# Patient Record
(demographics unavailable — no encounter records)

---

## 2018-05-29 NOTE — DIAGNOSTIC IMAGING REPORT
EXAMINATION: Right foot, three views.



COMPARISON: None. 



HISTORY: 83-year-old female, evaluation for osteomyelitis of the

fourth toe. History of ulcer. 



FINDINGS: The bones are demineralized. There is uncovering of the

lateral sesamoid with hallux valgus deformity. The first proximal

phalanx is laterally subluxed at the first metatarsophalangeal

joint. Lateral view evaluation is limited given overlap of

multiple bones. There is absence of portions of the proximal

aspect of the fourth middle phalanx without definite acute

appearing aggressive bone destruction radiographically. There is

no definite periosteal reaction. There is no identified

radiopaque foreign body. There is a dorsal soft tissue marker at

the level of the fourth proximal interphalangeal joint. There is

a calcaneal heel spur. There is no ankle joint effusion.



IMPRESSION: 

1. Absence of portions of the proximal aspect of the fourth

middle phalanx of uncertain exact chronicity. There is no overtly

obvious aggressive bone destruction radiographically. This is a

change since comparison foot radiographs of May 2015. Dedicated

MRI right foot would be recommended for further evaluation of

potential osteomyelitis.

2. Bone demineralization.

3. Hallux valgus deformity.

4. No identified radiopaque foreign body.



Dictated by: 



  Dictated on workstation # [id number]

## 2018-06-04 NOTE — DIAGNOSTIC IMAGING REPORT
PROCEDURE: MRI right lower extremity without contrast.



TECHNIQUE: Multiplanar, multisequence non contrast-enhanced MRI

of the right lower extremity was accomplished.



INDICATION: Fourth toe redness and swelling.



COMPARISON: Radiographs of 05/29/2018.



FINDINGS: There is abnormal circumferential soft tissue

thickening of the proximal and mid aspect of the fourth toe. This

has heterogeneous increased T2 signal within the thickened soft

tissues and T1 isointensity to muscle. There are erosive changes

of the fourth proximal phalanx head and near-complete erosion of

the fourth middle phalanx. The distal phalanx has a normal

appearance.



In the first metatarsal head, there are chronic periarticular

erosions present along the medial aspect which can be seen with

gout. The remainder of the visualized osseous structures in the

forefoot are unremarkable. Hallux valgus deformity is present

with crossover deformity of the great toe with the second toe.

Mild fatty atrophy of the intrinsic musculature of the foot.



IMPRESSION:

1. Abnormal circumferential soft tissue thickening of the fourth

toe with underlying erosive changes of the middle and proximal

phalanges. Imaging features are most suggestive of

subacute-to-chronic inflammatory process such as gout.

Correlation with serum uric acid levels and physical examination

is advised. If the patient has a skin wound present,

osteomyelitis could be present but this would be an atypical

appearance for "routine" osteomyelitis. Lastly, giant cell tumor

of the tendon sheath could potentially have this appearance.



Dictated by: 



  Dictated on workstation # [id number]

## 2018-06-12 NOTE — ED GENERAL
General


Chief Complaint:  General Problems/Pain


Stated Complaint:  ELEVATED BP


Nursing Triage Note:  


TO ROOM FROM WOUND CARE. WOUND CARE CONCERN BECAUSE B/P ELEVATED. PATIENT 


REPORTS THAT HAS BEEN  TAKEN OFF  B/P MEDS. PATIENT HAS NO C/O PAIN OR ANY 


PROBLEMS


Nursing Sepsis Screen:  No Definite Risk


Source of Information:  Patient


Exam Limitations:  No Limitations





History of Present Illness


Date Seen by Provider:  Jun 12, 2018


Time Seen by Provider:  10:20


Initial Comments


This 83-year-old woman presents to the emergency room as referred by wound 

care.  She was found to have an unacceptably high blood pressure in the wound 

care clinic and was sent here by RODOLFO Carson.  On our assessment her 

blood pressure is 162/87.  Patient denies any symptoms related to hypertension 

such as headache, blurry vision, chest pain, etc.  She reports changes to her 

blood pressure management and recent cessation of antihypertensive medications.





Allergies and Home Medications


Allergies


Coded Allergies:  


     No Known Drug Allergies (Verified , 10/7/16)





Home Medications


Acetaminophen 500 Mg Tablet, 1,000 MG PO TID


   Prescribed by: FADUMO LLOYD on 10/11/16 2046


Aspirin 81 Mg Tablet.dr, 81 MG PO DAILY, (Reported)


Aspirin 81 Mg Tablet.dr, 81 MG PO DAILY, (Reported)


Ca Cmb No.1/Vit D3/B-6/Fa/B12 1 Each Tablet, 1,000 UNITS PO DAILY, (Reported)


Citalopram Hydrobromide 20 Mg Tablet, 20 MG PO HS, (Reported)


Docosahexanoic Acid/Epa 1 Cap Capsule, 1,000 MG PO BID, (Reported)


Docusate Sodium 100 Mg Capsule, 100 MG PO BID


   Prescribed by: FADUMO LLOYD on 10/11/16 2046


Lisinopril 20 Mg Tablet, 20 MG PO DAILY@0900


   Prescribed by: FADUMO LLOYD on 10/11/16 2046


Nebivolol HCl 5 Mg Tablet, 5 MG PO DAILY, (Reported)


Omega 3 Polyunsat Fatty Acids 1,000 Mg Cap, 1,000 MG PO DAILY, (Reported)


Pantoprazole Sodium 40 Mg Tablet.dr, 40 MG PO DAILY@0700


   Prescribed by: FADUMO LLOYD on 10/11/16 2046


Simvastatin 20 Mg Tablet, 20 MG PO HS, (Reported)


Sucralfate 1 Gm Tablet, 1 GM PO ACHS


   Prescribed by: FADUMO LLOYD on 10/11/16 2046


Tramadol HCl 50 Mg Tablet, 50 MG PO TID PRN for PAIN


   Prescribed by: BHAVNA TINEO on 9/16/16 1226





Patient Home Medication List


Home Medication List Reviewed:  Yes





Review of Systems


Constitutional:  no symptoms reported


EENTM:  no symptoms reported


Respiratory:  no symptoms reported


Cardiovascular:  see HPI


Gastrointestinal:  no symptoms reported


Genitourinary:  no symptoms reported


Musculoskeletal:  no symptoms reported


Skin:  no symptoms reported


Psychiatric/Neurological:  No Symptoms Reported


Hematologic/Lymphatic:  No Symptoms Reported





Past Medical-Social-Family Hx


Past Med/Social Hx:  Reviewed and Corrections made


Patient Social History


Alcohol Use:  Denies Use


Recreational Drug Use:  No


Smoking Status:  Never a Smoker


Recent Foreign Travel:  No


Contact w/Someone Who Travel:  No


Recent Infectious Disease Expo:  No


Recent Hopitalizations:  No





Immunizations Up To Date


Tetanus Booster (TDap):  Unknown


PED Vaccines UTD:  No


Date of Pneumonia Vaccine:  Oct 1, 2017


Date of Influenza Vaccine:  Oct 1, 2017





Seasonal Allergies


Seasonal Allergies:  No





Past Medical History


Surgeries:  No


Respiratory:  No


Cardiac:  Yes (SEES DR. BUSBY UNSURE WHY)


Hypertension


Neurological:  Yes


Dementia


Reproductive Disorders:  No


Female Reproductive Disorders:  Denies


Sexually Transmitted Disease:  No


HIV/AIDS:  No


Genitourinary:  Yes


UTI-Chronic


Gastrointestinal:  No


Musculoskeletal:  Yes


Fractures, Gout


Endocrine:  Yes


Hyperthyroidism


HEENT:  Yes


Cataract


Loss of Vision:  Denies


Hearing Impairment:  Hard of Hearing


Cancer:  No


Psychosocial:  No


Integumentary:  No


Recent Skin Changes


Blood Disorders:  No


Adverse Reaction/Blood Tranf:  No





Family Medical History





Dementia


  G8 SISTER


Diabetes mellitus


  19 FATHER


No Pertinent Family Hx, Diabetes, Psychiatric Problems





Physical Exam


Vital Signs





Vital Signs - First Documented








 6/12/18





 10:47


 


Pulse 68


 


Resp 18


 


B/P (MAP) 162/87


 


Pulse Ox 93


 


O2 Delivery Room Air





Capillary Refill : Less Than 3 Seconds


General Appearance:  No Apparent Distress, WD/WN


HEENT:  PERRL/EOMI, Normal ENT Inspection


Neck:  Normal Inspection


Respiratory:  Lungs Clear, Normal Breath Sounds, No Accessory Muscle Use, No 

Respiratory Distress


Cardiovascular:  Regular Rate, Rhythm, No Edema, No Murmur


Neurologic/Psychiatric:  Alert, Oriented x3, No Motor/Sensory Deficits, Normal 

Mood/Affect, CNs II-XII Norm as Tested


Skin:  Normal Color, Warm/Dry





Progress/Results/Core Measures


Suspected Sepsis


Recent Fever Within 48 Hours:  No


Infection Criteria Present:  None


New/Unexplained  Altered Menta:  No


Sepsis Screen:  No Definite Risk


SIRS


Temperature: 


Pulse:  


Respiratory Rate: 


 


Blood Pressure  / 


Mean:





Results/Orders


Vital Signs/I&O


Capillary Refill : Less Than 3 Seconds


Progress Note :  


Progress Note


Patient has asymptomatic non-critical hypertension and was referred back to her 

primary care provider for repeat blood pressure check and medication management.





Departure


Impression





 Primary Impression:  


 Hypertension


 Qualified Codes:  I10 - Essential (primary) hypertension


Disposition:  01 HOME, SELF-CARE


Condition:  Improved





Departure-Patient Inst.


Decision time for Depature:  10:30


Referrals:  


NI HUMMEL MD (PCP/Family)


Primary Care Physician





Add. Discharge Instructions:  


Follow-up with your primary care provider soon as possible for a repeat blood 

pressure check.  Please call today for an appointment.  Return to emergency 

room if you are developing symptoms such as headache, changes in vision, chest 

pain, etc. that may be related to high blood pressure.  If you're not able to 

secure an appointment within the next few days, please stop in the clinic for a 

blood pressure check.




















All discharge instructions reviewed with patient and/or family. Voiced 

understanding.





Copy


Copies To 1:   ALIZA RODRIGUEZ JOSHUA T MD Jun 12, 2018 10:41

## 2018-06-28 NOTE — DIAGNOSTIC IMAGING REPORT
PROCEDURE: CT head and CT cervical spine without contrast.



TECHNIQUE: Multiple contiguous axial images were obtained through

the brain and cervical spine without the use of intravenous

contrast. Sagittal and coronal reformations through the cervical

spine were then performed.



INDICATION: Fall striking the head. Study correlated with brain

MRI performed in 2015 and with head CT 08/23/2012. No prior

cervical imaging



CT head: There is focal soft tissue irregularities posterior left

laterally with the underlying calvarium appearing normal. No

fracture deformity. No hemo-sinus. There is cerebral cortical

atrophy and periventricular white matter small vessel sequelae

but no evidence for edema or hemorrhage. No mass or mass effect.

Aside from the soft tissue swelling, no other acute abnormality

identified.



CT cervical spine: There is grade 1 anterolisthesis C3 on C4, C4

on C5 and retrolisthesis C6 on C7; these are all about 2-3 mm and

are accompanied by degenerative changes to the discs, endplates,

uncovertebral joints and facets. Cervical statures are within

normal limits and there was no acute or suspicious endplate

irregularity or fracture. There is vascular calcifications of the

carotids. There is no paravertebral mass, hemorrhage or fluid

collection.



IMPRESSION:



CT head: Soft tissue swelling in the left scalp posteriorly but

no underlying fracture or intracranial hemorrhage. There are

chronic senescent intracranial changes of atrophy and white

matter disease but no hemorrhage or other abnormality.



CT cervical spine: Degenerative changes throughout the cervical

spine are believed to account for slight grade 1 multilevel

degenerative listhesis. No fracture or dislocation.



 



Dictated by: 



  Dictated on workstation # [id number]

## 2018-06-28 NOTE — ED FALL/INJURY
General


Chief Complaint:  Trauma-Non Activation


Stated Complaint:  FELL,HEAD INJ


Source:  patient


Exam Limitations:  no limitations





History of Present Illness


Date Seen by Provider:  2018


Time Seen by Provider:  07:19


Initial Comments


Here with report of fall and head injury this morning.  Apparently was walking 

with her walker when she lost her balance and went forward.  She is able to get 

herself righted and then fell backwards against the door frame.  She hit her 

head on the door frame and has a bump on the back of her head on the left side 

as well as abrasion.  No loss of consciousness.  She did not go all the way to 

the floor.  States that she is not sick currently does not feel weaker than 

normal but she was 83.  She reports that is the reason for her imbalance.  

Denies nausea or vomiting.  Denies vision problems.


Occurred:  this morning


Severity:  mild


Injuries/Pain Location:  head


Context:  lost balance


Loss of Consciousness:  no loss of consciousness


Modifying Factors:  Improves With Rest


Associated Symptoms (Fall):  No Abdominal Pain, No Chest Pain, No Confusion, No 

Headache, No Muscle Spasms, No Nausea/Vomiting, No Vision Changes





Allergies and Home Medications


Allergies


Coded Allergies:  


     No Known Drug Allergies (Verified , 10/7/16)





Home Medications


Acetaminophen 500 Mg Tablet, 1,000 MG PO TID


   Prescribed by: FADUMO LLOYD on 10/11/16 2046


Aspirin 81 Mg Tablet.dr, 81 MG PO DAILY, (Reported)


Aspirin 81 Mg Tablet.dr, 81 MG PO DAILY, (Reported)


Ca Cmb No.1/Vit D3/B-6/Fa/B12 1 Each Tablet, 1,000 UNITS PO DAILY, (Reported)


Citalopram Hydrobromide 20 Mg Tablet, 20 MG PO HS, (Reported)


Docosahexanoic Acid/Epa 1 Cap Capsule, 1,000 MG PO BID, (Reported)


Docusate Sodium 100 Mg Capsule, 100 MG PO BID


   Prescribed by: FADUMO LLOYD on 10/11/16 2046


Lisinopril 20 Mg Tablet, 20 MG PO DAILY@0900


   Prescribed by: FADUMO LLOYD on 10/11/16 2046


Nebivolol HCl 5 Mg Tablet, 5 MG PO DAILY, (Reported)


Omega 3 Polyunsat Fatty Acids 1,000 Mg Cap, 1,000 MG PO DAILY, (Reported)


Pantoprazole Sodium 40 Mg Tablet.dr, 40 MG PO DAILY@0700


   Prescribed by: FADUMO LLOYD on 10/11/16 2046


Simvastatin 20 Mg Tablet, 20 MG PO HS, (Reported)


Sucralfate 1 Gm Tablet, 1 GM PO ACHS


   Prescribed by: FADUMO LLOYD on 10/11/16 2046


Tramadol HCl 50 Mg Tablet, 50 MG PO TID PRN for PAIN


   Prescribed by: BHAVNA TINEO on 16 1226





Patient Home Medication List


Home Medication List Reviewed:  Yes





Review of Systems


Constitutional:  see HPI; No chills, No fever


Eyes:  No Symptoms Reported


Ears, Nose, Mouth, Throat:  no symptoms reported


Respiratory:  no symptoms reported


Cardiovascular:  No chest pain, No syncope


Gastrointestinal:  No nausea, No vomiting


Genitourinary:  no symptoms reported


Musculoskeletal:  No back pain, No joint pain, No neck pain


Skin:  see HPI, change in color, lesions


Psychiatric/Neurological:  No Symptoms Reported





Past Medical-Social-Family Hx


Past Med/Social Hx:  Reviewed and Corrections made


Patient Social History


Alcohol Use:  Denies Use


Recreational Drug Use:  No


Smoking Status:  Never a Smoker


Recent Foreign Travel:  No


Contact w/Someone Who Travel:  No


Recent Hopitalizations:  No





Immunizations Up To Date


Tetanus Booster (TDap):  Unknown


PED Vaccines UTD:  No


Date of Pneumonia Vaccine:  Oct 1, 2017


Date of Influenza Vaccine:  Oct 1, 2017





Seasonal Allergies


Seasonal Allergies:  No





Past Medical History


Surgeries:  No


Respiratory:  No


Cardiac:  Yes (SEES DR. BUSBY UNSURE WHY)


Hypertension


Neurological:  Yes


Dementia


Reproductive Disorders:  No


Female Reproductive Disorders:  Denies


Sexually Transmitted Disease:  No


HIV/AIDS:  No


Genitourinary:  Yes


UTI-Chronic


Gastrointestinal:  No


Musculoskeletal:  Yes


Fractures, Gout


Endocrine:  Yes


Hyperthyroidism


HEENT:  Yes


Cataract


Loss of Vision:  Denies


Hearing Impairment:  Hard of Hearing


Cancer:  No


Psychosocial:  No


Integumentary:  No


Recent Skin Changes


Blood Disorders:  No


Adverse Reaction/Blood Tranf:  No





Family Medical History


Reviewed Nursing Family Hx





Dementia


  G8 SISTER


Diabetes mellitus


  19 FATHER


No Pertinent Family Hx, Diabetes, Psychiatric Problems





Physical Exam


Vital Signs





Vital Signs - First Documented








 18





 07:16


 


Temp 97.4


 


Pulse 77


 


Resp 16


 


B/P (MAP) 160/67 (98)


 


Pulse Ox 97


 


O2 Delivery Room Air





Capillary Refill :


General Appearance:  WD/WN, no apparent distress


HEENT:  PERRL/EOMI, pharynx normal


Neck:  full range of motion, supple


Cardiovascular:  regular rate, rhythm, no murmur


Respiratory:  lungs clear, normal breath sounds


Peripheral Pulses:  2+ Dorsalis Pedis (R), 2+ Left Dors-Pedis (L), 2+ Radial 

Pulses (R), 2+ Radial Pulses (L)


Gastrointestinal:  non tender, soft


Back:  normal inspection, no CVA tenderness, no vertebral tenderness


Extremities:  non-tender, normal inspection


Neurologic/Psychiatric:  alert, oriented x 3


Skin:  warm/dry, other (abrasion overlying 3 x 3 cm scalp hematoma left 

posterior scalp.  No significant laceration.)





Lookout Coma Score


Best Eye Response:  (4) Open Spontaneously


Best Verbal Response:  (5) Oriented


Best Motor Response:  (6) Obeys Commands





Progress/Results/Core Measures


Results/Orders


My Orders





Orders - NARENDRA ROSAS MD


Ct Head/Cervical Spine Wo (18 07:30)


Dipht,Pertuss(Acell),Tet Adult (Boostrix (18 07:30)





Vital Signs/I&O











 18





 07:16


 


Temp 97.4


 


Pulse 77


 


Resp 16


 


B/P (MAP) 160/67 (98)


 


Pulse Ox 97


 


O2 Delivery Room Air











Progress


Progress Note :  


Progress Note


Seen and evaluated.  CT head and neck ordered.  Wound cleaned and covered with 

antibiotic per nursing.  Monitor patient.  Tetanus updated.  0820: No acute 

findings.  Discharged home with return precautions.  Patient verbalize 

understanding instructions and agreement with plan.





Diagnostic Imaging





   Diagonstic Imaging:  CT


   Plain Films/CT/US/NM/MRI:  c-spine, head


Comments


NAME:   DARRION SINCLAIR [facility] REC#:   [id number]


ACCOUNT#:   [id number]


PT STATUS:   REG ER


:   1935


PHYSICIAN:   NARENDRA ROSAS MD


ADMIT DATE:   18/ER


 ***Draft***


Date of Exam:18





CT HEAD/CERVICAL SPINE WO








PROCEDURE: CT head and CT cervical spine without contrast.





TECHNIQUE: Multiple contiguous axial images were obtained through


the brain and cervical spine without the use of intravenous


contrast. Sagittal and coronal reformations through the cervical


spine were then performed.





INDICATION: Fall striking the head. Study correlated with brain


MRI performed in  and with head CT 2012. No prior


cervical imaging





CT head: There is focal soft tissue irregularities posterior left


laterally with the underlying calvarium appearing normal. No


fracture deformity. No hemo-sinus. There is cerebral cortical


atrophy and periventricular white matter small vessel sequelae


but no evidence for edema or hemorrhage. No mass or mass effect.


Aside from the soft tissue swelling, no other acute abnormality


identified.





CT cervical spine: There is grade 1 anterolisthesis C3 on C4, C4


on C5 and retrolisthesis C6 on C7; these are all about 2-3 mm and


are accompanied by degenerative changes to the discs, endplates,


uncovertebral joints and facets. Cervical statures are within


normal limits and there was no acute or suspicious endplate


irregularity or fracture. There is vascular calcifications of the


carotids. There is no paravertebral mass, hemorrhage or fluid


collection.





IMPRESSION:





CT head: Soft tissue swelling in the left scalp posteriorly but


no underlying fracture or intracranial hemorrhage. There are


chronic senescent intracranial changes of atrophy and white


matter disease but no hemorrhage or other abnormality.





CT cervical spine: Degenerative changes throughout the cervical


spine are believed to account for slight grade 1 multilevel


degenerative listhesis. No fracture or dislocation.





 





  Dictated on workstation # [id number]








Dict:   [id number]


Trans:   18 0816


SHANI [id number]





Interpreted by:     YANIQUE BARRAGAN


Electronically signed by:





Departure


Impression





 Primary Impression:  


 Head injury, acute, without loss of consciousness


 Qualified Codes:  S09.90XA - Unspecified injury of head, initial encounter


 Additional Impression:  


 Abrasion


Disposition:  01 HOME, SELF-CARE


Condition:  Stable





Departure-Patient Inst.


Decision time for Depature:  08:25


Referrals:  


NI HUMMEL MD (PCP/Family)


Primary Care Physician


Patient Instructions:  Minor Head Injury (DC), Skin Abrasions (DC)





Add. Discharge Instructions:  


All discharge instructions reviewed with patient and/or family. Voiced 

understanding.





Follow-up with your doctor this week for recheck.  Return for worsening, fever, 

vomiting, weakness, breathing problems or other concerns as needed.  You may 

use antibiotic ointment over area of abrasion on the back of the head once or 

twice daily for the next several days and then as needed.











NARENDRA ROSAS MD 2018 07:38

## 2018-08-09 NOTE — XMS REPORT
[facility]

 Created on: 2018



Boby Kely

External Reference #: [id number]

: 1935

Sex: Female



Demographics







 Address  [address] [place]  [address]

 

 Preferred Language  Unknown

 

 Marital Status  Unknown

 

 Islam Affiliation  Unknown

 

 Race  Unknown

 

 Ethnic Group  Unknown





Author







 Author  ODIN GAUTHIER

 

 [facility]

 

 Address  [address] [place]  [address]



 

 Phone  [phone]







Care Team Providers







 Care Team Member Name  Role  Phone

 

 ODIN GAUTHIER  Unavailable  [phone]







PROBLEMS







 Type  Condition  ICD9-CM Code  CKT55-KQ Code  Onset Dates  Condition Status  
SNOMED Code

 

 Problem  Hypertension     I10     Active  28833789

 

 Problem  Dementia in other diseases classified elsewhere without behavioral 
disturbance     F02.80     Active  148999680

 

 Problem  Ataxia     R27.0     Active  13334401

 

 Problem  Hyperlipidemia     E78.5     Active  42279362

 

 Problem  Delusional disorder     F22     Active  20561555

 

 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 
condition     F28     Active  99460941

 

 Problem  Psychosis, unspecified psychosis type     F29     Active  20489542

 

 Problem  Alzheimers disease with late onset     G30.1     Active  04407298

 

 Problem  Unspecified dementia without behavioral disturbance     F03.90     
Active  48360983

 

 Problem  Hallucinations     R44.3     Active  2380762







ALLERGIES

No Information



ENCOUNTERS







 Encounter  Location  Date  Diagnosis

 

 [place] [id number] [place] [address] 52576-
3170  08 Aug, 2018   

 

 [place] [id number] [place] [address] 21812-
7662    Hallucinations R44.3 ; Alzheimers disease with late onset 
G30.1 and Toe pain, right M79.674

 

 [facility]  [id number] [place] [address] 54512-
7988     

 

 [facility]  [address] [address]-
4062    Hallucinations R44.3

 

 [facility]  [id number] [place] [address] 68886-
7068     

 

 [facility]  [address] [address]-
2602    Delusional disorder F22 and Psychosis, unspecified 
psychosis type F29

 

 [facility]  [id number] [place] [address] 12290-
2971     

 

 [facility]  [id number] [place] [address] 56822-
1791     

 

 [facility]  [id number] [place] [address] 89435-
4016  22 May, 2018  Local infection of the skin and subcutaneous tissue, 
unspecified L08.9 and Other injury of unspecified body region, initial 
encounter T14.8XXA

 

 [facility]  [id number] [place] [address] 39725-
3168  17 May, 2018   

 

 [facility]  [id number] [place] [address] 99851-
9185  10 May, 2018   

 

 [facility]  [id number] [place] [address] 59291-
0533    Hallucinations R44.3

 

 [facility]  [id number] [place] [address] 14118-
0166  19 Mar, 2018  Psychosis, unspecified psychosis type F29

 

 [facility]  [id number] [place] [address] 05916-
0004  15 Mar, 2018   

 

 [facility]  [id number] [place] [address] 33915-
7107  13 Mar, 2018   

 

 [facility]  [id number] [place] [address] 25976-
4424    Unspecified dementia without behavioral disturbance F03.90 
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 
condition F28

 

 [facility]  [id number] [place] [address] 38820-
4089     

 

 [facility]  [id number] [place] [address] 29346-
3564     

 

 [facility]  [id number] [place] [address] 26888-
2381     

 

 [facility]  [address] [place] [address] 62249-
5536    Dementia, unspecified, without behavioral disturbance F03.90

 

 [place] [id number] [place] [address] 81092-
4665  06 Dec, 2017  Medicare annual wellness visit, initial Z00.00 and 
Encounter for immunization Z23

 

 [place] [id number] [place] [address] 71135-
9705    Ataxia R27.0 and Hallucinations R44.3

 

 [place] [id number] [place] [address] 23692-
7276     

 

 [place] [id number] [place] [address] 33041-
7845  12 Oct, 2017  Encounter for immunization Z23

 

 [place] [id number] [place] [address] 28564-
0070  11 Sep, 2017  Hallucinations R44.3

 

 [place] [id number] [place] [address] 08266-
4236  05 Sep, 2017  Hallucinations R44.3

 

 [place] [id number] [place] [address] 49000-
1818  30 Aug, 2017  Arthralgia of left knee M25.562 ; Age-related nuclear 
cataract of both eyes H25.13 and Hallucinations R44.3

 

 [place] [id number] [place] [address] 87148-
4857  24 Aug, 2017   

 

 [place] [id number] [place] [address] 11994-
3624  14 Aug, 2017   

 

 [place] [id number] [place] [address] 35562-
7488    Hyperlipidemia E78.5 and Hypertension I10

 

 [place] [id number] [place] [address] 63674-
4046    Hyperlipidemia E78.5 and Hypertension I10

 

 [facility]  [id number] [place] [address] 62709-
2716     

 

 [facility]  [id number] [place] [address] 11398-
8572  15 2017  Hypertension I10 ; Alzheimers disease with late onset G30.1 
; Dementia in other diseases classified elsewhere without behavioral 
disturbance F02.80 and Ataxia R27.0

 

 [facility]  [id number] [place] [address] 67317-
0796  14 2016   

 

 [facility]  [id number] [place] [address] 02006-
6124     

 

 [facility]  [id number] [place] [address] 69060-
1075    Hypertension I10 and Ataxia R27.0

 

 [facility]  [id number] [place] [address] 27814-
6779  14 Oct, 2016   

 

 [facility]  [id number] [place] [address] 68900-
9949  26 Sep, 2016   

 

 [facility]  [id number] [place] [address] 37910-
8185  23 Sep, 2016   

 

 [facility]  [id number] [place] [address] 29786-
9127  23 Sep, 2016   

 

 [facility]  [id number] [place] [address] 90803-
2005  20 Sep, 2016   

 

 [facility]  [id number] [place] [address] 55310-
4761  16 Sep, 2016   

 

 [facility]  [id number] [place] [address] [phone]
-[phone]  13 Aug, 2016  Urinary frequency R35.0 and Acute cystitis without 
hematuria N30.00

 

 [facility]  [id number] [place] [address] 89798-
9769     

 

 [facility]  [id number] [place] [address] 75576-
7723     

 

 [place] [id number] [place] [address] 50198-
0833  11 Mar, 2016  Hyperlipidemia E78.5

 

 [address] [address]-
3074  10 Mar, 2016  Ataxia R27.0 ; Hyperlipidemia E78.5 and Hypertension I10

 

 [address] 9[address]21-
4440     

 

 [place] [id number] [place] [address] 32076-
4747    Ataxia R27.0 ; Senile cataracts of both eyes H25.9 and 
Constipation, unspecified constipation type K59.00

 

 [address] 8[address]40-
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

 

 [address] [address]19-
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

 

 [address] [address]66-
6418  21 Aug, 2015  Dementia 294.20

 

 [place] [id number] [place] [address] 24953-
6623  13 Aug, 2015  Establishing care with new doctor, encounter for V65.8 ; 
Ataxia 781.3 ; Horizontal nystagmus 379.56 ; Hypertension 401.9 ; 
Hyperlipidemia 272.4 and History of TIA (transient ischemic attack) V12.54

 

 [place] [id number] [place] [address] 59313-
6429  06 Aug, 2015   

 

 [address] [address]191-
9672  05 Aug, 2015   







IMMUNIZATIONS

No Known Immunizations



SOCIAL HISTORY

Never Assessed



REASON FOR VISIT





PLAN OF CARE





VITAL SIGNS





MEDICATIONS







 Medication  Instructions  Dosage  Frequency  Start Date  End Date  Duration  
Status

 

 Lexapro 10 mg  Orally Once a day  1/2 tablet  24h  19 Mar, 2018        Active







RESULTS

No Results



PROCEDURES

No Known procedures



INSTRUCTIONS





MEDICATIONS ADMINISTERED

No Known Medications



MEDICAL (GENERAL) HISTORY







 Type  Description  Date

 

 Medical History  coronary artery disease   

 

 Medical History  Carotid stenosis   

 

 Medical History  hypertension   

 

 Medical History  hyperlipidemia   

 

 Medical History  mitral valve regurgitation   

 

 Medical History  vitamin D deficiency   

 

 Medical History  nystagmus   

 

 Medical History  chest pain syndrome/palpitations   

 

 Medical History  ataxia/unsteady gait   

 

 Medical History  TIAs   

 

 Medical History  stress test 2012 EF 81%; 2014 EF 78%   

 

 Medical History  echo 2012 EF 60% mild MR,TR, AV stenosis; 2014 EF 60% 
aortic regurgitatio, MR, TR   

 

 Medical History  carotid duplex 2012 <40% Bilat; 2014 <40% bilat   

 

 Medical History  cataracts   

 

 Surgical History  left cataract surgery  2018

 

 Hospitalization History  falls   

 

 Hospitalization History  left hip pain, age-indeterminate pubic rami fracture--
[facility]  16

## 2018-08-09 NOTE — XMS REPORT
[facility]

 Created on: 2016



Kely Landis

External Reference #: [id number]

: 1935

Sex: Female



Demographics







 Address  [address] [place] [address] 

[place]  [address]

 

 Home Phone  [phone]

 

 Preferred Language  Unknown

 

 Marital Status  Unknown

 

 Sikhism Affiliation  Unknown

 

 Race  White

 

 Ethnic Group  Not  or 





Author







 Author  NIKKI MORALES

 

 [facility]  [employer]

 

 Address  Unknown

 

 Phone  Unavailable







Care Team Providers







 Care Team Member Name  Role  Phone

 

 NIKKI MORALES  CP  Unavailable



                                                                



Allergies, Adverse Reactions, Alerts

          





 Substance  Reaction  Event Type

 

 N.K.D.A.  Info Not Available  Non Drug Allergy



                                                                               
         



Problems

          





 Problem Type  Condition  Code  Onset Dates  Condition Status

 

 Assessment  Urinary frequency  R35.0     Active

 

 Assessment  Acute cystitis without hematuria  N30.00     Active

 

 Problem  Ataxia  R27.0     Active

 

 Problem  Hypertension  I10     Active

 

 Problem  Hyperlipidemia  E78.5     Active

 

 Problem  Hypertension  401.9     Active

 

 Problem  Hyperlipidemia  272.4     Active

 

 Problem  Unspecified psychosis  298.9     Active

 

 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



                                                                               
                                                                               
          



Medications

          





 Medication  Code System  Code  Instructions  Start Date  End Date  Status  
Dosage

 

 Colace  NDC  [address]-73  100 MG Orally (Home Delivery) Once a day may 
repeat if needed           1 capsule as needed for constipation

 

 Lisinopril  NDC  [address]-05  40 MG Orally Once a day           1 tablet

 

 Aspirin Low Dose  NDC  [address]-18  81 MG Orally Once a day           1 
tablet

 

 Tylenol  NDC  [address]-05  325 MG Orally 2 times a day           1 tablet as 
needed

 

 Bystolic  NDC  [address]-91  5 MG Orally (Home Delivery) Once a day           
1 tablet

 

 Celexa  NDC  [address]-21  20 mg Orally (Home Delivery) Once a day           
1 tablet

 

 Fish Oil  NDC  [address]-47  1000 MG Orally Twice a day           1 capsule

 

 Advil  NDC  [address]-61  200 MG Orally every 6 hrs           1 tablet as 
needed

 

 Bactrim DS  NDC  [address]-49  800-160 MG Orally Twice a day  Aug 13, 2016  
Aug 18, 2016     1 tablet

 

 Simvastatin  NDC  [address]-10  20 mg Orally  (Home Delivery) Once a day     
      1 tablet in the evening



                                                                               
                                                                               
                    



Procedures

          





 Procedure  Coding System  Code  Date

 

 [facility]  [facility]  NOBLL  Aug 13, 2016

 

 [facility] VISIT ESTABLISHED PATIENT  CPT-4    Aug 13, 2016

 

 URINALYSIS, AUTO, W/O SCOPE  CPT-4  74781  Aug 13, 2016

 

 Office Visit, Est Pt., Level 3  CPT-4  26791  Aug 13, 2016



                                                                               
                                                 



Vital Signs

          





 Date/Time:  Aug 13, 2016

 

 Cardiac Monitoring Heart Rate  58 bpm

 

 Weight  122.0 lbs

 

 Height  64 in

 

 BMI  20.94 Index

 

 Blood Pressure Diastolic  70 mmHg

 

 Blood Pressure Systolic  104 mmHg



                                                                              



Results

          No Known Results                                                     
               



Summary Purpose

          eClinicalWorks Submission

## 2018-08-09 NOTE — XMS REPORT
[facility]

 Created on: 2017



Landis Kely

External Reference #: [id number]

: 1935

Sex: Female



Demographics







 Address  [address] 

[place]  [address]

 

 Preferred Language  Unknown

 

 Marital Status  Unknown

 

 Jew Affiliation  Unknown

 

 Race  Unknown

 

 Ethnic Group  Unknown





Author







 Author  NI HUMMEL

 

 [facility]

 

 Address  [address] [place]  [address]



 

 Phone  [phone]







Care Team Providers







 Care Team Member Name  Role  Phone

 

 NI HUMMEL  Unavailable  [phone]







PROBLEMS







 Type  Condition  ICD9-CM Code  NED29-HK Code  Onset Dates  Condition Status  
SNOMED Code

 

 Problem  Hyperlipidemia  272.4        Active  58018412

 

 Problem  Hyperlipidemia     E78.5     Active  61088360

 

 Problem  Ataxia     R27.0     Active  99508951

 

 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

 

 Problem  Hypertension  401.9        Active  44298148

 

 Problem  Hypertension     I10     Active  92392331

 

 Problem  Unspecified psychosis  298.9        Active  43329561







ALLERGIES

Unknown Allergies



SOCIAL HISTORY

No smoking Hx information available



PLAN OF CARE





VITAL SIGNS





MEDICATIONS

Unknown Medications



RESULTS

No Results



PROCEDURES

No Known procedures



IMMUNIZATIONS

No Known Immunizations

## 2018-08-09 NOTE — XMS REPORT
[facility]

 Created on: 2015



Kely Kulkarni

External Reference #: [id number]

: 1935

Sex: Female



Demographics







 Address  [address] [place] [address] [place]  [address]

 

 Home Phone  [phone]

 

 Preferred Language  Unknown

 

 Marital Status  Unknown

 

 Christian Affiliation  Unknown

 

 Race  Unreported/Refused to Report

 

 Ethnic Group  Not  or 





Author







 Author  CAROLA ASENCIO

 

 Organization  [employer]

 

 Address  Unknown

 

 Phone  Unavailable







Care Team Providers







 Care Team Member Name  Role  Phone

 

 CAROLA ASENCIO  CP  Unavailable



                                                                



Allergies

          No Known Allergies                                                   
                                     



Problems

          





 Problem Type  Condition  ICD-9 Code  Onset Dates  Condition Status

 

 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

 

 Problem  Hypertension  401.9     Active

 

 Problem  Unspecified psychosis  298.9     Active

 

 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

 

 Problem  Hyperlipidemia  272.4     Active

 

 Assessment  Unspecified psychosis  298.9     Active



                                                                               
                                                           



Medications

          No Known Medications                                                 
                                       



Procedures

          





 Procedure  Coding System  Code  Date

 

 Psych diagnostic evaluation, established patient  CPT-4  12038  2015

 

 [facility] VISIT MENTAL HEALTH ESTAB PT  CPT-4    2015



                                                                               
         



Results

          No Known Results                                                     
               



Summary Purpose

          eClinicalWorks Submission

## 2018-08-09 NOTE — XMS REPORT
[facility]

 Created on: 2015



Kely Kulkarni

External Reference #: [id number]

: 1935

Sex: Female



Demographics







 Address  [address] [place] [address] [place]  [address]

 

 Home Phone  [phone]

 

 Preferred Language  Unknown

 

 Marital Status  Unknown

 

 Mandaen Affiliation  Unknown

 

 Race  Unreported/Refused to Report

 

 Ethnic Group  Not  or 





Author







 Author  ODIN GAUTHIER

 

 [facility]  [employer]

 

 Address  Unknown

 

 Phone  Unavailable







Care Team Providers







 Care Team Member Name  Role  Phone

 

 ODIN GAUTHIER  CP  Unavailable



                                                                



Allergies, Adverse Reactions, Alerts

          





 Substance  Reaction  Event Type

 

 N.K.D.A.  Info Not Available  Non Drug Allergy



                                                                               
         



Problems

          





 Problem Type  Condition  Code  Onset Dates  Condition Status

 

 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

 

 Problem  Hypertension  401.9     Active

 

 Problem  Unspecified psychosis  298.9     Active

 

 Problem  Hyperlipidemia  272.4     Active

 

 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



                                                                               
                                                 



Medications

          





 Medication  Code System  Code  Instructions  Start Date  End Date  Status  
Dosage

 

 Simvastatin  NDC  [address]-10  20 MG Orally Once a day           1 tablet in 
the evening

 

 Bystolic  NDC  [address]-20  5 MG Orally Once a day           1 tablet

 

 Coenzyme Q10  NDC  [address]-81  10 MG Orally Once a day           1 capsule 
with a meal

 

 Magnesium Oxide  NDC  [address]-57  400 MG Orally Once a day           not 
defined

 

 Celexa  NDC  [address]-39  20 MG Orally Once a day           1 tablet

 

 Fish Oil  NDC  [address]-17  1000 MG Orally Twice a day           1 capsule

 

 Tylenol  NDC  [address]-72  325 MG Orally 2 times a day           1 tablet as 
needed

 

 Lisinopril  NDC  [address]-53  40 MG Orally Once a day           1 tablet

 

 Ibuprofen  NDC  [address]-64  200 MG Orally Once a day           1 tablet as 
needed

 

 Advil  NDC  [address]-21  200 MG Orally every 6 hrs           1 tablet as 
needed

 

 Aspirin Low Dose  NDC  [address]-48  81 MG Orally Once a day           1 
tablet



                                                                               
                                                                               
                              



Procedures

          





 Procedure  Coding System  Code  Date

 

 MIGUEL BRIDGES*  CPT-4  60455  2015



                                                                               
                   



Vital Signs

          





 Date/Time:  2015

 

 Temperature  97.8 F

 

 Weight  117 lbs

 

 Height  64 in

 

 BMI  20.08 Index

 

 Blood Pressure Diastolic  74 mmHg

 

 Blood Pressure Systolic  130 mmHg

 

 Cardiac Monitoring Heart Rate  64 bpm



                                                                              



Results

          No Known Results                                                     
               



Summary Purpose

          eClinicalWorks Submission

## 2018-08-09 NOTE — XMS REPORT
[facility]

 Created on: 2017



LandisKely buck

External Reference #: [id number]

: 1935

Sex: Female



Demographics







 Address  [address] 

[place]  [address]

 

 Preferred Language  Unknown

 

 Marital Status  Unknown

 

 Orthodox Affiliation  Unknown

 

 Race  Unknown

 

 Ethnic Group  Unknown





Author







 Author  ODIN GAUTHIER

 

 [facility]

 

 Address  [address] [place]  [address]



 

 Phone  [phone]







Care Team Providers







 Care Team Member Name  Role  Phone

 

 ODIN GAUTHIER  Unavailable  [phone]







PROBLEMS







 Type  Condition  ICD9-CM Code  GCV48-XH Code  Onset Dates  Condition Status  
SNOMED Code

 

 Problem  Hyperlipidemia  272.4        Active  69043682

 

 Problem  Hyperlipidemia     E78.5     Active  03312147

 

 Problem  Ataxia     R27.0     Active  44498639

 

 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

 

 Problem  Hypertension  401.9        Active  82112306

 

 Problem  Hypertension     I10     Active  78976490

 

 Problem  Unspecified psychosis  298.9        Active  89704584







ALLERGIES

Unknown Allergies



SOCIAL HISTORY

No smoking Hx information available



PLAN OF CARE





VITAL SIGNS





MEDICATIONS

Unknown Medications



RESULTS

No Results



PROCEDURES

No Known procedures



IMMUNIZATIONS

No Known Immunizations

## 2018-08-09 NOTE — XMS REPORT
[facility]

 Created on: 2017



Landis Kely

External Reference #: [id number]

: 1935

Sex: Female



Demographics







 Address  [address] 

[place]  [address]

 

 Preferred Language  Unknown

 

 Marital Status  Unknown

 

 Anabaptist Affiliation  Unknown

 

 Race  Unknown

 

 Ethnic Group  Unknown





Author







 Author  NI HUMMEL

 

 [facility]

 

 Address  [address] [place]  [address]



 

 Phone  [phone]







Care Team Providers







 Care Team Member Name  Role  Phone

 

 NI HUMMEL  Unavailable  [phone]







PROBLEMS







 Type  Condition  ICD9-CM Code  MVU12-FE Code  Onset Dates  Condition Status  
SNOMED Code

 

 Problem  Hyperlipidemia  272.4        Active  71539054

 

 Problem  Hyperlipidemia     E78.5     Active  79940133

 

 Problem  Ataxia     R27.0     Active  68180502

 

 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

 

 Problem  Hypertension  401.9        Active  80422203

 

 Problem  Hypertension     I10     Active  04865154

 

 Problem  Unspecified psychosis  298.9        Active  79704238







ALLERGIES

Unknown Allergies



SOCIAL HISTORY

No smoking Hx information available



PLAN OF CARE





VITAL SIGNS





MEDICATIONS

Unknown Medications



RESULTS

No Results



PROCEDURES

No Known procedures



IMMUNIZATIONS

No Known Immunizations

## 2018-08-09 NOTE — XMS REPORT
[facility]

 Created on: 07/15/2018



Boby Kely

External Reference #: [id number]

: 1935

Sex: Female



Demographics







 Address  [address] [place]  [address]

 

 Preferred Language  Unknown

 

 Marital Status  Unknown

 

 Anglican Affiliation  Unknown

 

 Race  Unknown

 

 Ethnic Group  Unknown





Author







 Author  ODIN GAUTHIER

 

 [facility]

 

 Address  [address] [place]  [address]



 

 Phone  [phone]







Care Team Providers







 Care Team Member Name  Role  Phone

 

 ODIN GAUTHIER  Unavailable  [phone]







PROBLEMS







 Type  Condition  ICD9-CM Code  XSC21-MP Code  Onset Dates  Condition Status  
SNOMED Code

 

 Problem  Hypertension     I10     Active  66706970

 

 Problem  Dementia in other diseases classified elsewhere without behavioral 
disturbance     F02.80     Active  101862410

 

 Problem  Ataxia     R27.0     Active  07763294

 

 Problem  Hyperlipidemia     E78.5     Active  00001176

 

 Problem  Delusional disorder     F22     Active  95073425

 

 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 
condition     F28     Active  15569369

 

 Problem  Psychosis, unspecified psychosis type     F29     Active  48524919

 

 Problem  Alzheimers disease with late onset     G30.1     Active  12358487

 

 Problem  Unspecified dementia without behavioral disturbance     F03.90     
Active  42978646

 

 Problem  Hallucinations     R44.3     Active  1143564







ALLERGIES

No Information



ENCOUNTERS







 Encounter  Location  Date  Diagnosis

 

 [place] [id number] [place] [address] [phone]-
[phone]  08 Aug, 2018   

 

 [place] [address] [phone]-
[phone]    Hallucinations R44.3 ; Alzheimers disease with late onset 
G30.1 and Toe pain, right M79.674

 

 [facility]  [id number] [place] [address] [phone]-
[phone]     

 

 [facility]  [address] [phone]-
[phone]    Hallucinations R44.3

 

 [place] [id number] [place] [address] [phone]-
[phone]     

 

 [facility]  [address] [phone]-
[phone]    Delusional disorder F22 and Psychosis, unspecified 
psychosis type F29

 

 [facility]  [id number] [place] [address] [phone]-
4706     

 

 [facility]  [id number] [place] [address] 62732-
5269     

 

 [facility]  [id number] [place] [address] [phone]-
[phone]  22 May, 2018  Local infection of the skin and subcutaneous tissue, 
unspecified L08.9 and Other injury of unspecified body region, initial 
encounter T14.8XXA

 

 [facility]  [id number] [place] [address] [phone]-
[phone]  17 May, 2018   

 

 [facility]  [id number] [place] [address] [phone]-
[phone]  10 May, 2018   

 

 [facility]  [id number] [place] [address] [phone]-
[phone]    Hallucinations R44.3

 

 [facility]  [id number] [place] [address] 37563-
[phone]  19 Mar, 2018  Psychosis, unspecified psychosis type F29

 

 [facility]  [id number] [place] [address] 88815-
6150  15 Mar, 2018   

 

 [facility]  [id number] [place] [address] 70866-
2030  13 Mar, 2018   

 

 [facility]  [id number] [place] [address] 28921-
8163    Unspecified dementia without behavioral disturbance F03.90 
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 
condition F28

 

 [facility]  [id number] [place] [address] 19703-
5129     

 

 [facility]  [id number] [place] [address] [phone]-
[phone]     

 

 [facility]  [id number] [place] [address] 32876-
[phone]     

 

 [facility]  [address] [place] [address] 68374-
2503    Dementia, unspecified, without behavioral disturbance F03.90

 

 [place] [id number] [place] [address] 28533-
[phone]  06 Dec, 2017  Medicare annual wellness visit, initial Z00.00 and 
Encounter for immunization Z23

 

 [place] [id number] [place] [address] [phone]-
5871    Ataxia R27.0 and Hallucinations R44.3

 

 [place] [id number] [place] [address] 97546-
[phone]     

 

 [place] [id number] [place] [address] 20908-
[phone]  12 Oct, 2017  Encounter for immunization Z23

 

 [place] [id number] [place] [address] 99716-
[phone]  11 Sep, 2017  Hallucinations R44.3

 

 [place] [id number] [place] [address] 94752-
7336  05 Sep, 2017  Hallucinations R44.3

 

 [place] [id number] [place] [address] 30960-
3662  30 Aug, 2017  Arthralgia of left knee M25.562 ; Age-related nuclear 
cataract of both eyes H25.13 and Hallucinations R44.3

 

 [place] [id number] [place] [address] 28184-
9718  24 Aug, 2017   

 

 [place] [id number] [place] [address] 19281-
[phone]  14 Aug, 2017   

 

 [place] [id number] [place] [address] 95825-
[phone]    Hyperlipidemia E78.5 and Hypertension I10

 

 [place] [id number] [place] [address] 06247-
7233    Hyperlipidemia E78.5 and Hypertension I10

 

 [facility]  [id number] [place] [address] 63685-
6253     

 

 [facility]  [id number] [place] [address] 45061-
0416  15 2017  Hypertension I10 ; Alzheimers disease with late onset G30.1 
; Dementia in other diseases classified elsewhere without behavioral 
disturbance F02.80 and Ataxia R27.0

 

 [facility]  [id number] [place] [address] 69884-
4034  14 2016   

 

 [facility]  [id number] [place] [address] 61196-
[phone]     

 

 [facility]  [id number] [place] [address] 51769-
0365    Hypertension I10 and Ataxia R27.0

 

 [facility]  [id number] [place] [address] 37742-
5317  14 Oct, 2016   

 

 [facility]  [id number] [place] [address] 33314-
9158  26 Sep, 2016   

 

 [facility]  [id number] [place] [address] 32758-
0305  23 Sep, 2016   

 

 [facility]  [id number] [place] [address] 60026-
[phone]  23 Sep, 2016   

 

 [facility]  [id number] [place] [address] 60952-
7344  20 Sep, 2016   

 

 [facility]  [id number] [place] [address] 25884-
0328  16 Sep, 2016   

 

 [facility]  [id number] [place] [address] [phone]
[phone]  13 Aug, 2016  Urinary frequency R35.0 and Acute cystitis without 
hematuria N30.00

 

 [facility]  [id number] [place] [address] 61180-
[phone]     

 

 [facility]  [id number] [place] [address] 06302-
1206     

 

 [place] [id number] [place] [address] 08029-
8713  11 Mar, 2016  Hyperlipidemia E78.5

 

 [address] [address]-
9935  10 Mar, 2016  Ataxia R27.0 ; Hyperlipidemia E78.5 and Hypertension I10

 

 [address] [address]-
1518     

 

 [place] [id number] [place] [address] 79238-
5551    Ataxia R27.0 ; Senile cataracts of both eyes H25.9 and 
Constipation, unspecified constipation type K59.00

 

 [address] [phone]-
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

 

 [address] [address]-
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

 

 [address] [address]-
3521  21 Aug, 2015  Dementia 294.20

 

 [place] [address] [id number] [place] [address] 47515-
4841  13 Aug, 2015  Establishing care with new doctor, encounter for V65.8 ; 
Ataxia 781.3 ; Horizontal nystagmus 379.56 ; Hypertension 401.9 ; 
Hyperlipidemia 272.4 and History of TIA (transient ischemic attack) V12.54

 

 [place] [id number] [place] [address] 58208-
9031  06 Aug, 2015   

 

 [address] [phone]-
[phone]  05 Aug, 2015   







IMMUNIZATIONS

No Known Immunizations



SOCIAL HISTORY

Never Assessed



REASON FOR VISIT

Neuro Psyche results



PLAN OF CARE





VITAL SIGNS





MEDICATIONS







 Medication  Instructions  Dosage  Frequency  Start Date  End Date  Duration  
Status

 

 Lexapro 10 mg  Orally Once a day  1 tablet  24h  13 Mar, 2018     30 day(s)  
Active







RESULTS

No Results



PROCEDURES

No Known procedures



INSTRUCTIONS





MEDICATIONS ADMINISTERED

No Known Medications



MEDICAL (GENERAL) HISTORY







 Type  Description  Date

 

 Medical History  coronary artery disease   

 

 Medical History  Carotid stenosis   

 

 Medical History  hypertension   

 

 Medical History  hyperlipidemia   

 

 Medical History  mitral valve regurgitation   

 

 Medical History  vitamin D deficiency   

 

 Medical History  nystagmus   

 

 Medical History  chest pain syndrome/palpitations   

 

 Medical History  ataxia/unsteady gait   

 

 Medical History  TIAs   

 

 Medical History  stress test 2012 EF 81%; 2014 EF 78%   

 

 Medical History  echo 2012 EF 60% mild MR,TR, AV stenosis; 2014 EF 60% 
aortic regurgitatio, MR, TR   

 

 Medical History  carotid duplex 2012 <40% Bilat; 2014 <40% bilat   

 

 Medical History  cataracts   

 

 Surgical History  left cataract surgery  2018

 

 Hospitalization History  falls   

 

 Hospitalization History  left hip pain, age-indeterminate pubic rami fracture--
[facility]  16

## 2018-08-09 NOTE — XMS REPORT
[facility]

 Created on: 09/10/2015



Kely Kulkarni

External Reference #: [id number]

: 1935

Sex: Female



Demographics







 Address  [address] [place] [address] [place]  [address]

 

 Home Phone  [phone]

 

 Preferred Language  Unknown

 

 Marital Status  Unknown

 

 Religion Affiliation  Unknown

 

 Race  Unreported/Refused to Report

 

 Ethnic Group  Not  or 





Author







 Author  SUNSHINE WEST

 

 [facility]  [employer]

 

 Address  Unknown

 

 Phone  Unavailable







Care Team Providers







 Care Team Member Name  Role  Phone

 

 SUNSHINE WETS  CP  Unavailable



                                                                



Allergies

          No Known Allergies                                                   
                                     



Problems

          





 Problem Type  Condition  ICD-9 Code  Onset Dates  Condition Status

 

 Problem  Hyperlipidemia  272.4     Active

 

 Problem  Hypertension  401.9     Active



                                                                               
                   



Medications

          No Known Medications                                                 
                             



Results

          No Known Results                                                     
               



Summary Purpose

          [employer]Clinical[employer] Submission

## 2018-08-09 NOTE — XMS REPORT
[facility]

 Created on: 2016



Kely Landis

External Reference #: [id number]

: 1935

Sex: Female



Demographics







 Address  [address] [place] [address] [place]  [address]

 

 Home Phone  [phone]

 

 Preferred Language  Unknown

 

 Marital Status  Unknown

 

 Latter-day Affiliation  Unknown

 

 Race  White

 

 Ethnic Group  Not  or 





Author







 Author  ODIN GAUTHIER

 

 Organization  [employer]

 

 Address  Unknown

 

 Phone  Unavailable







Care Team Providers







 Care Team Member Name  Role  Phone

 

 ODIN GAUTHIER  CP  Unavailable



                                                                



Allergies, Adverse Reactions, Alerts

          





 Substance  Reaction  Event Type

 

 N.K.D.A.  Info Not Available  Non Drug Allergy



                                                                               
         



Problems

          





 Problem Type  Condition  Code  Onset Dates  Condition Status

 

 Assessment  Hypertension  I10     Active

 

 Assessment  Ataxia  R27.0     Active

 

 Problem  Ataxia  R27.0     Active

 

 Problem  Hypertension  I10     Active

 

 Problem  Hyperlipidemia  E78.5     Active

 

 Problem  Hypertension  401.9     Active

 

 Problem  Hyperlipidemia  272.4     Active

 

 Problem  Unspecified psychosis  298.9     Active

 

 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



                                                                               
                                                                               
          



Medications

          





 Medication  Code System  Code  Instructions  Start Date  End Date  Status  
Dosage

 

 Colace  NDC  [address]-13  100 MG Orally (Home Delivery) Once a day may 
repeat if needed           1 capsule as needed for constipation

 

 Fish Oil  NDC  [address]-70  1000 MG Orally Twice a day           1 capsule

 

 Celexa  NDC  [address]-50  20 mg Orally (Home Delivery) Once a day           
1 tablet

 

 Carafate  NDC  [address]-83  1 GM Orally before meals and bedtime  Oct 14, 
2016        1 tablet on an empty stomach

 

 Aspirin Low Dose  NDC  [address]-97  81 MG Orally Once a day           1 
tablet

 

 Bystolic  NDC  [address]-33  5 MG Orally (Home Delivery) Once a day           
1 tablet

 

 Simvastatin  NDC  [address]-10  20 mg Orally  (Home Delivery) Once a day     
      1 tablet in the evening

 

 Protonix  NDC  [address]-08  40 mg Orally Once a day  Oct 14, 2016        1 
tablet



                                                                               
                                                                               



Procedures

          





 Procedure  Coding System  Code  Date

 

 Office Visit, Est Pt., Level 3  CPT-4  54553  2016

 

 Count includes the [facility] VISIT ESTABLISHED PATIENT  CPT-4    2016



                                                                               
                             



Vital Signs

          





 Date/Time:  2016

 

 Cardiac Monitoring Heart Rate  56 bpm

 

 Weight  117.5 lbs

 

 Height  64 in

 

 BMI  20.17 Index

 

 Blood Pressure Diastolic  68 mmHg

 

 Blood Pressure Systolic  129 mmHg



                                                                              



Results

          No Known Results                                                     
               



Summary Purpose

          [employer]ClinicalWorks Submission

## 2018-08-09 NOTE — XMS REPORT
[facility]

 Created on: 2018



Boby Kely

External Reference #: [id number]

: 1935

Sex: Female



Demographics







 Address  [address] [place]  [address]

 

 Preferred Language  Unknown

 

 Marital Status  Unknown

 

 Christianity Affiliation  Unknown

 

 Race  Unknown

 

 Ethnic Group  Unknown





Author







 Author  ODIN GAUTHIER

 

 [facility]

 

 Address  [address] [place]  [address]



 

 Phone  [phone]







Care Team Providers







 Care Team Member Name  Role  Phone

 

 ODIN GAUTHIER  Unavailable  [phone]







PROBLEMS







 Type  Condition  ICD9-CM Code  RWD57-TR Code  Onset Dates  Condition Status  
SNOMED Code

 

 Problem  Ataxia     R27.0     Active  51182357

 

 Problem  Hypertension     I10     Active  53885020

 

 Problem  Hyperlipidemia     E78.5     Active  34375872

 

 Problem  Unspecified dementia without behavioral disturbance     F03.90     
Active  02023881

 

 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 
condition     F28     Active  92824673

 

 Problem  Alzheimers disease with late onset     G30.1     Active  91556835

 

 Problem  Dementia in other diseases classified elsewhere without behavioral 
disturbance     F02.80     Active  397474874

 

 Problem  Hallucinations     R44.3     Active  3676020

 

 Problem  Psychosis, unspecified psychosis type     F29     Active  33586632







ALLERGIES

No Known Allergies



ENCOUNTERS







 Encounter  Location  Date  Diagnosis

 

 [place] [id number] [place] [address] 11861-
3536     

 

 [place] [id number] [place] [address] 27571-
9508  22 May, 2018  Local infection of the skin and subcutaneous tissue, 
unspecified L08.9 and Other injury of unspecified body region, initial 
encounter T14.8XXA

 

 [facility]  [address] [place] [address] 99727-
3735  17 May, 2018   

 

 [facility]  [id number] [place] [address] 57054-
0597  10 May, 2018   

 

 [facility]  [id number] [place] [address] 09660-
4657    Hallucinations R44.3

 

 [facility]  [id number] [place] [address] 59013-
4003  19 Mar, 2018  Psychosis, unspecified psychosis type F29

 

 [facility]  [address] [address]-
8997  15 Mar, 2018   

 

 [facility]  [id number] [place] [address] 48801-
1119  13 Mar, 2018   

 

 [facility]  [id number] [place] [address] 85655-
2676    Unspecified dementia without behavioral disturbance F03.90 
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 
condition F28

 

 [facility]  [id number] [place] [address] 68906-
7009     

 

 [facility]  [id number] [place] [address] 16096-
5992     

 

 [facility]  [id number] [place] [address] 87977-
1938     

 

 [facility]  [id number] [place] [address] 26970-
8379    Dementia, unspecified, without behavioral disturbance F03.90

 

 [place] [id number] [place] [address] 22774-
1881  06 Dec, 2017  Medicare annual wellness visit, initial Z00.00 and 
Encounter for immunization Z23

 

 [place] [id number] [place] [address] 49974-
3946    Ataxia R27.0 and Hallucinations R44.3

 

 [facility]  [address] [address]-
7947     

 

 [facility]  [id number] [place] [address] 92738-
4631  12 Oct, 2017  Encounter for immunization Z23

 

 [facility]  [id number] [place] [address] 31526-
1317  11 Sep, 2017  Hallucinations R44.3

 

 [facility]  [id number] [place] [address] 84221-
5942  05 Sep, 2017  Hallucinations R44.3

 

 [facility]  [id number] [place] [address] 28105-
3172  30 Aug, 2017  Arthralgia of left knee M25.562 ; Age-related nuclear 
cataract of both eyes H25.13 and Hallucinations R44.3

 

 [facility]  [id number] [place] [address] 13031-
8298  24 Aug, 2017   

 

 [facility]  [address] [address]-
4809  14 Aug, 2017   

 

 [facility]  [id number] [place] [address] 84532-
6190    Hyperlipidemia E78.5 and Hypertension I10

 

 [place] [id number] [place] [address] 06187-
0910    Hyperlipidemia E78.5 and Hypertension I10

 

 [place] [address] 93171-
0117     

 

 [facility]  [id number] [place] [address] 16636-
9144  15 Feb, 2017  Hypertension I10 ; Alzheimers disease with late onset G30.1 
; Dementia in other diseases classified elsewhere without behavioral 
disturbance F02.80 and Ataxia R27.0

 

 [place] [id number] [place] [address] 62718-
1439  14 2016   

 

 [facility]  [id number] [place] [address] 47646-
1593     

 

 [facility]  [id number] [place] [address] 96492-
0035    Hypertension I10 and Ataxia R27.0

 

 [place] [address] 97768-
4666  14 Oct, 2016   

 

 [facility]  [id number] [place] [address] 23858-
4529  26 Sep, 2016   

 

 [facility]  [address] [place] [address]-
3896  23 Sep, 2016   

 

 [facility]  [id number] [place] [address] 62664-
5624  23 Sep, 2016   

 

 [facility]  [id number] [place] [address] 30347-
3954  20 Sep, 2016   

 

 [facility]  [id number] [place] [address] 51794-
1406  16 Sep, 2016   

 

 [facility] WALK [facility]  [id number] [place] [address] 39405
-[phone]  13 Aug, 2016  Urinary frequency R35.0 and Acute cystitis without 
hematuria N30.00

 

 [facility]  [id number] [place] [address] 97399-
7212     

 

 [facility]  [id number] [place] [address] 88772-
6411     

 

 [facility]  [id number] [place] [address] 38850-
1697  11 Mar, 2016  Hyperlipidemia E78.5

 

 [facility]  [id number] [place] [address] 84502-
0766  10 Mar, 2016  Ataxia R27.0 ; Hyperlipidemia E78.5 and Hypertension I10

 

 [facility]  [id number] [place] [address] 63705-
1607     

 

 [facility]  [id number] [place] [address] 84164-
1359    Ataxia R27.0 ; Senile cataracts of both eyes H25.9 and 
Constipation, unspecified constipation type K59.00

 

 [facility]  [id number] [place] [address] 92607-
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

 

 [facility]  [id number] [place] [address] 99620-
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

 

 [facility]  [id number] [place] [address]-
6682  21 Aug, 2015  Dementia 294.20

 

 [place] [address] [facility] [id number] [address]-
7099  13 Aug, 2015  Establishing care with new doctor, encounter for V65.8 ; 
Ataxia 781.3 ; Horizontal nystagmus 379.56 ; Hypertension 401.9 ; 
Hyperlipidemia 272.4 and History of TIA (transient ischemic attack) V12.54

 

 [place] [address] [place] [id number] [address]-
3377  06 Aug, 2015   

 

 [place] [address] [facility] [id number] [address]-
3909  05 Aug, 2015   







IMMUNIZATIONS

No Known Immunizations



SOCIAL HISTORY

Never Assessed



REASON FOR VISIT

Hallucination f/u--H Shane EDWARDS



PLAN OF CARE







 Activity  Details









  









 Follow Up  6 Months Reason:hallucinations







VITAL SIGNS







 Height  64 in  2017

 

 Weight  115.3 lbs  2017

 

 Temperature  97.7 degrees Fahrenheit  2017

 

 Heart Rate  66 bpm  2017

 

 Respiratory Rate  18   2017

 

 BMI  19.79 kg/m2  2017

 

 Blood pressure systolic  132 mmHg  2017

 

 Blood pressure diastolic  86 mmHg  2017







MEDICATIONS







 Medication  Instructions  Dosage  Frequency  Start Date  End Date  Duration  
Status

 

 Fish Oil 1000 MG  Orally Twice a day  1 capsule  12h           Active

 

 Celexa 20 MG  Orally Once a day  1 tablet  24h        90 days  Not-Taking

 

 Aspirin Low Dose 81 MG  Orally Once a day  1 tablet  24h           Active







RESULTS

No Results



PROCEDURES







 Procedure  Date Ordered  Result  Body Site

 

 [facility] VISIT ESTABLISHED PATIENT  2017      







INSTRUCTIONS





MEDICATIONS ADMINISTERED

No Known Medications



MEDICAL (GENERAL) HISTORY







 Type  Description  Date

 

 Medical History  coronary artery disease   

 

 Medical History  Carotid stenosis   

 

 Medical History  hypertension   

 

 Medical History  hyperlipidemia   

 

 Medical History  mitral valve regurgitation   

 

 Medical History  vitamin D deficiency   

 

 Medical History  nystagmus   

 

 Medical History  chest pain syndrome/palpitations   

 

 Medical History  ataxia/unsteady gait   

 

 Medical History  TIAs   

 

 Medical History  stress test 2012 EF 81%; 2014 EF 78%   

 

 Medical History  echo 2012 EF 60% mild MR,TR, AV stenosis; 2014 EF 60% 
aortic regurgitatio, MR, TR   

 

 Medical History  carotid duplex 2012 <40% Bilat; 2014 <40% bilat   

 

 Medical History  cataracts   

 

 Surgical History  left cataract surgery  2018

 

 Hospitalization History  falls   

 

 Hospitalization History  left hip pain, age-indeterminate pubic rami fracture--
[facility]  16

## 2018-08-09 NOTE — XMS REPORT
[facility]

 Created on: 2018



Boby Kely

External Reference #: [id number]

: 1935

Sex: Female



Demographics







 Address  [address] [place]  [address]

 

 Preferred Language  Unknown

 

 Marital Status  Unknown

 

 [place] Affiliation  Unknown

 

 Race  Unknown

 

 Ethnic Group  Unknown





Author







 Author  ODIN GAUTHIER

 

 [facility]

 

 Address  [address] [place]  [address]



 

 Phone  [phone]







Care Team Providers







 Care Team Member Name  Role  Phone

 

 ODIN GAUTHIER  Unavailable  [phone]







PROBLEMS







 Type  Condition  ICD9-CM Code  LEA53-YE Code  Onset Dates  Condition Status  
SNOMED Code

 

 Problem  Ataxia     R27.0     Active  23028322

 

 Problem  Hypertension     I10     Active  20062861

 

 Problem  Hyperlipidemia     E78.5     Active  19109671

 

 Problem  Unspecified dementia without behavioral disturbance     F03.90     
Active  23421810

 

 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 
condition     F28     Active  09981541

 

 Problem  Alzheimers disease with late onset     G30.1     Active  44737383

 

 Problem  Dementia in other diseases classified elsewhere without behavioral 
disturbance     F02.80     Active  423742286

 

 Problem  Hallucinations     R44.3     Active  1668564

 

 Problem  Psychosis, unspecified psychosis type     F29     Active  46962664







ALLERGIES

No Known Allergies



ENCOUNTERS







 Encounter  Location  Date  Diagnosis

 

 [place] [address] [place] [id number] [place] [address] 86057-
4892    Hallucinations R44.3

 

 [facility]  [id number] [place] [address] 93720-
6053  19 Mar, 2018  Psychosis, unspecified psychosis type F29

 

 [facility]  [id number] [place] [address] 14333-
3267  15 Mar, 2018   

 

 [facility]  [id number] [place] [address] 75302-
8985  13 Mar, 2018   

 

 [facility]  [id number] [place] [address] 05051-
0960    Unspecified dementia without behavioral disturbance F03.90 
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 
condition F28

 

 [facility]  [id number] [place] [address] 77731-
6329     

 

 [facility]  [id number] [place] [address] 85727-
2773     

 

 [place] [id number] [place] [address] 55808-
6326     

 

 [place] [id number] [place] [address] 20618-
6305    Dementia, unspecified, without behavioral disturbance F03.90

 

 [place] [id number] [place] [address] 59208-
5139  06 Dec, 2017  Medicare annual wellness visit, initial Z00.00 and 
Encounter for immunization Z23

 

 [place] [address] [address]284-
2635    Ataxia R27.0 and Hallucinations R44.3

 

 [place] [address] 07368-
4278     

 

 [place] [id number] [place] [address] 41287-
8679  12 Oct, 2017  Encounter for immunization Z23

 

 [place] [address] [address]58-
4889  11 Sep, 2017  Hallucinations R44.3

 

 [place] [address] 93966-
3542  05 Sep, 2017  Hallucinations R44.3

 

 [place] [id number] [place] [address] 67424-
8108  30 Aug, 2017  Arthralgia of left knee M25.562 ; Age-related nuclear 
cataract of both eyes H25.13 and Hallucinations R44.3

 

 [place] [id number] [place] [address] 92984-
0001  24 Aug, 2017   

 

 [place] [id number] [place] [address] 89340-
6746  14 Aug, 2017   

 

 [place] [address] 10724-
2085    Hyperlipidemia E78.5 and Hypertension I10

 

 [facility]  [id number] [place] [address] 33306-
1292    Hyperlipidemia E78.5 and Hypertension I10

 

 [facility]  [id number] [place] [address] 76052-
8283     

 

 [facility]  [address] [address]1914-
6459  15 Feb, 2017  Hypertension I10 ; Alzheimers disease with late onset G30.1 
; Dementia in other diseases classified elsewhere without behavioral 
disturbance F02.80 and Ataxia R27.0

 

 [facility]  [address] [address]-
1459  14 2016   

 

 [facility]  [id number] [place] [address] 06219-
1207     

 

 [facility]  [address] [address]167-
5037    Hypertension I10 and Ataxia R27.0

 

 [facility]  [id number] [place] [address] 09233-
5325  14 Oct, 2016   

 

 [facility]  [id number] [place] [address] 93388-
4185  26 Sep, 2016   

 

 [facility]  [id number] [place] [address] 79267-
1924  23 Sep, 2016   

 

 [facility]  [id number] [place] [address] 80575-
0068  23 Sep, 2016   

 

 [facility]  [id number] [place] [address] 23085-
3216  20 Sep, 2016   

 

 [facility]  [id number] [place] [address] 18382-
7261  16 Sep, 2016   

 

 [facility]  [id number] [place] [address] 01186
[phone]  13 Aug, 2016  Urinary frequency R35.0 and Acute cystitis without 
hematuria N30.00

 

 [place] [id number] [place] [address] 41435-
3844     

 

 [place] [id number] [place] [address] 99811-
0796     

 

 [place] [id number] [place] [address] 01774-
4779  11 Mar, 2016  Hyperlipidemia E78.5

 

 [place] [address] 40088-
0964  10 Mar, 2016  Ataxia R27.0 ; Hyperlipidemia E78.5 and Hypertension I10

 

 [place] [address] 14032-
2529     

 

 [place] [id number] [place] [address] 52723-
1197    Ataxia R27.0 ; Senile cataracts of both eyes H25.9 and 
Constipation, unspecified constipation type K59.00

 

 [place] [id number] [place] [address] 18554-
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

 

 [place] [id number] [place] [address] 59624-
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

 

 [place] [id number] [place] [address] 37910-
7200  21 Aug, 2015  Dementia 294.20

 

 [place] [id number] [place] [address] 74527-
0426  13 Aug, 2015  Establishing care with new doctor, encounter for V65.8 ; 
Ataxia 781.3 ; Horizontal nystagmus 379.56 ; Hypertension 401.9 ; 
Hyperlipidemia 272.4 and History of TIA (transient ischemic attack) V12.54

 

 [place] [id number] [place] [address] 25189-
9185  06 Aug, 2015   

 

 [place] [address] 72739-
6700  05 Aug, 2015   







IMMUNIZATIONS

No Known Immunizations



SOCIAL HISTORY

Never Assessed



REASON FOR VISIT

general check-up, PT has no concerns- Ayla EDWARDS, pt is being rude and hateful 
to her daughter, states it has to do with her "ghost issue". Daughter does not 
want her mother to know that she is has told you this as she is already mad at 
her. Kinjal



PLAN OF CARE







 Activity  Details









  









 Follow Up  prn Reason:







VITAL SIGNS







 Height  64 in  2017

 

 Weight  115.6 lbs  2017

 

 Temperature  98.1 degrees Fahrenheit  2017

 

 Heart Rate  84 bpm  2017

 

 Respiratory Rate  18   2017

 

 BMI  19.84 kg/m2  2017

 

 Blood pressure systolic  124 mmHg  2017

 

 Blood pressure diastolic  72 mmHg  2017







MEDICATIONS







 Medication  Instructions  Dosage  Frequency  Start Date  End Date  Duration  
Status

 

 Aspirin Low Dose 81 MG  Orally Once a day  1 tablet  24h           Active

 

 Fish Oil 1000 MG  Orally Twice a day  1 capsule  12h           Active







RESULTS

No Results



PROCEDURES







 Procedure  Date Ordered  Result  Body Site

 

 [facility] VISIT ESTABLISHED PATIENT  2017      







INSTRUCTIONS





MEDICATIONS ADMINISTERED

No Known Medications



MEDICAL (GENERAL) HISTORY







 Type  Description  Date

 

 Medical History  coronary artery disease   

 

 Medical History  Carotid stenosis   

 

 Medical History  hypertension   

 

 Medical History  hyperlipidemia   

 

 Medical History  mitral valve regurgitation   

 

 Medical History  vitamin D deficiency   

 

 Medical History  nystagmus   

 

 Medical History  chest pain syndrome/palpitations   

 

 Medical History  ataxia/unsteady gait   

 

 Medical History  TIAs   

 

 Medical History  stress test 2012 EF 81%; 2014 EF 78%   

 

 Medical History  echo 2012 EF 60% mild MR,TR, AV stenosis; 2014 EF 60% 
aortic regurgitatio, MR, TR   

 

 Medical History  carotid duplex 2012 <40% Bilat; 2014 <40% bilat   

 

 Medical History  cataracts   

 

 Surgical History  left cataract surgery  2018

 

 Hospitalization History  falls   

 

 Hospitalization History  left hip pain, age-indeterminate pubic rami fracture--
[facility]  16

## 2018-08-09 NOTE — ED GENERAL
General


Chief Complaint:  General Problems/Pain


Stated Complaint:  HEAD, NECK, NOSE, AND L SHOULDER PAIN


Nursing Triage Note:  


TO ROOM PER W/C ACCOMPIED BY DAUGHTER IN LAW. PATIENT REPORTS THAT SHE WOKE UP 


THIS AM WITH HER NOSE HURTING. SPREADING ACROSS FACE


Nursing Sepsis Screen:  No Definite Risk


Source of Information:  Patient, Family


Exam Limitations:  No Limitations





History of Present Illness


Date Seen by Provider:  Aug 9, 2018


Time Seen by Provider:  18:38


Initial Comments


This 83-year-old woman presents to the emergency room accompanied by her 

daughter-in-law with complaints of nasal pain spreading across her cheeks and 

forehead and around the left eye.  She states earlier in the day it radiated 

down her left neck and into her left elbow.  Symptoms have been improving.  

They started after she woke around 10:00.  She also has had some sneezing and 

nasal irritation.  She states emesis 2 today.  She took some Tylenol at home 

and has been improving since then.  I have seen this patient multiple times in 

the ER and on each occasion she claims that there are ghosts living in her home 

and the ghosts follow her wherever she goes.  She blames the ghosts for her 

symptoms and states they shot "sneezing powder" up her nose that caused the 

symptoms.  Patient's family is fairly accepting of these claims and does not 

attempt to argue with the patient when she talks about the ghosts.  She has 

been seeing and living with ghosts for years.  Patient denies any injury to the 

face.





Allergies and Home Medications


Allergies


Coded Allergies:  


     No Known Drug Allergies (Verified , 10/7/16)





Home Medications


Acetaminophen 500 Mg Tablet, 1,000 MG PO TID


   Prescribed by: FADUMO LLOYD on 10/11/16 2046


Aspirin 81 Mg Tablet.dr, 81 MG PO DAILY, (Reported)


Aspirin 81 Mg Tablet.dr, 81 MG PO DAILY, (Reported)


Ca Cmb No.1/Vit D3/B-6/Fa/B12 1 Each Tablet, 1,000 UNITS PO DAILY, (Reported)


Citalopram Hydrobromide 20 Mg Tablet, 20 MG PO HS, (Reported)


Docosahexanoic Acid/Epa 1 Cap Capsule, 1,000 MG PO BID, (Reported)


Docusate Sodium 100 Mg Capsule, 100 MG PO BID


   Prescribed by: FADUMO LLOYD on 10/11/16 2046


Fluticasone Propionate 9.9 Ml [place].susp, 2 SPRAY NSEACH DAILY


   2 SPRAYS PER NOSTRIL DAILY X 2 DAYS THEN 1 SPRAY DAILY 


   Prescribed by: GRICEL PERRY on 8/9/18 1926


Lisinopril 20 Mg Tablet, 20 MG PO DAILY@0900


   Prescribed by: FADUMO LLOYD on 10/11/16 2046


Nebivolol HCl 5 Mg Tablet, 5 MG PO DAILY, (Reported)


Omega 3 Polyunsat Fatty Acids 1,000 Mg Cap, 1,000 MG PO DAILY, (Reported)


Pantoprazole Sodium 40 Mg Tablet.dr, 40 MG PO DAILY@0700


   Prescribed by: FADUMO LLOYD on 10/11/16 2046


Simvastatin 20 Mg Tablet, 20 MG PO HS, (Reported)


Sucralfate 1 Gm Tablet, 1 GM PO ACHS


   Prescribed by: FADUMO LLOYD on 10/11/16 2046


Tramadol HCl 50 Mg Tablet, 50 MG PO TID PRN for PAIN


   Prescribed by: BHAVNA TINEO on 9/16/16 1226





Patient Home Medication List


Home Medication List Reviewed:  Yes





Review of Systems


Constitutional:  no symptoms reported


EENTM:  see HPI


Respiratory:  no symptoms reported


Cardiovascular:  no symptoms reported


Gastrointestinal:  see HPI


Genitourinary:  no symptoms reported


Pregnant:  No


Musculoskeletal:  no symptoms reported


Skin:  no symptoms reported


Psychiatric/Neurological:  See HPI


Hematologic/Lymphatic:  No Symptoms Reported


Immunological/Allergic:  no symptoms reported





Past Medical-Social-Family Hx


Past Med/Social Hx:  Reviewed Nursing Past Med/Soc Hx


Patient Social History


Alcohol Use:  Denies Use


Recreational Drug Use:  No


Smoking Status:  Never a Smoker


Recent Foreign Travel:  No


Contact w/Someone Who Travel:  No


Recent Infectious Disease Expo:  No


Recent Hopitalizations:  No





Immunizations Up To Date


Tetanus Booster (TDap):  Unknown


PED Vaccines UTD:  No


Date of Pneumonia Vaccine:  Oct 1, 2017


Date of Influenza Vaccine:  Oct 1, 2017





Seasonal Allergies


Seasonal Allergies:  No





Past Medical History


Surgeries:  No


Respiratory:  No


Cardiac:  Yes (SEES DR. BUSBY UNSURE WHY)


Hypertension


Neurological:  Yes


Dementia


Pregnant:  No


Reproductive Disorders:  No


Female Reproductive Disorders:  Denies


Sexually Transmitted Disease:  No


HIV/AIDS:  No


Genitourinary:  Yes


UTI-Chronic


Gastrointestinal:  No


Musculoskeletal:  Yes


Fractures, Gout


Endocrine:  Yes


Hyperthyroidism


HEENT:  Yes


Cataract


Loss of Vision:  Denies


Hearing Impairment:  Hard of Hearing


Cancer:  No


Psychosocial:  No


Integumentary:  No


Recent Skin Changes


Blood Disorders:  No


Adverse Reaction/Blood Tranf:  No





Family Medical History


Reviewed Nursing Family Hx





Dementia


  G8 SISTER


Diabetes mellitus


  19 FATHER


No Pertinent Family Hx, Diabetes, Psychiatric Problems





Physical Exam


Vital Signs





Vital Signs - First Documented








 8/9/18





 18:17


 


Temp 96.8


 


Pulse 76


 


Resp 18


 


B/P (MAP) 186/68 (107)


 


Pulse Ox 98


 


O2 Delivery Room Air





Capillary Refill : Less Than 3 Seconds


Height, Weight, BMI


Height: 5'3.00"


Weight: 120lbs. 0.0oz. 54.118604kq; 20.1 BMI


Method:Stated


General Appearance:  No Apparent Distress, WD/WN


HEENT:  PERRL/EOMI, TMs Normal (left TM obscured by cerumen), Normal ENT 

Inspection, Other (.  Oropharynx somewhat dry.  Mild tenderness around the 

maxilla bilaterally and left orbit.  No swelling, erythema, heat, rash, or 

other abnormalities on visual inspection)


Neck:  Normal Inspection


Respiratory:  Lungs Clear, Normal Breath Sounds, No Accessory Muscle Use, No 

Respiratory Distress


Cardiovascular:  Regular Rate, Rhythm, No Edema, No Murmur


Gastrointestinal:  Normal Bowel Sounds, Non Tender, Soft


Extremity:  Normal Inspection, No Pedal Edema


Neurologic/Psychiatric:  Alert, No Motor/Sensory Deficits, Normal Mood/Affect, 

CNs II-XII Norm as Tested, Other (hallucinations of ghosts)


Skin:  Normal Color, Warm/Dry





Progress/Results/Core Measures


Suspected Sepsis


Recent Fever Within 48 Hours:  No


Infection Criteria Present:  None


New/Unexplained  Altered Menta:  No


Sepsis Screen:  No Definite Risk


SIRS


Temperature:96.8 


Pulse: 76 


Respiratory Rate: 18


 


Blood Pressure 186 /68 


Mean: 107





Results/Orders


Vital Signs/I&O











 8/9/18 8/9/18





 18:17 19:33


 


Temp 96.8 96.8


 


Pulse 76 76


 


Resp 18 18


 


B/P (MAP) 186/68 (107) 186/68 (107)


 


Pulse Ox 98 98


 


O2 Delivery Room Air Room Air





Capillary Refill : Less Than 3 Seconds








Blood Pressure Mean:  107








Progress Note :  


Progress Note


There are no significant exam findings except for some mild intranasal 

irritation.  Patient drank a full glass of water without any nausea or 

vomiting.  We will trial some nasal steroid spray.  She is to follow-up with 

her doctor next week if this does not improve her symptoms.





Departure


Impression





 Primary Impression:  


 Facial pain


 Additional Impressions:  


 Sneezing


 Nausea and vomiting


 Qualified Codes:  R11.2 - Nausea with vomiting, unspecified


Disposition:  01 HOME, SELF-CARE


Condition:  Stable





Departure-Patient Inst.


Decision time for Depature:  19:23


Referrals:  


NI HUMMEL MD (PCP/Family)


Primary Care Physician


Patient Instructions:  Seasonal Allergies in Adults, Sinusitis in Adults





Add. Discharge Instructions:  


Drink plenty of clear liquids.  Use Flonase as prescribed.  Follow-up with your 

primary care provider on Monday if not improving.  Return to the ER if symptoms 

are worsening especially if you develop fevers over 100.  You may take Tylenol 

(acetaminophen) up to 650 mg every 6 hours as needed for pain.











All discharge instructions reviewed with patient and/or family. Voiced 

understanding.


Scripts


Fluticasone Propionate (Flonase Allergy Relief) 9.9 Ml [place].susp


2 SPRAY NSEACH DAILY, #1 EACH


   2 SPRAYS PER NOSTRIL DAILY X 2 DAYS THEN 1 SPRAY DAILY


   Prov: GRICEL HILL MD         8/9/18





Copy


Copies To 1:   NI HUMMEL MD, JOSHUA T MD Aug 9, 2018 19:27

## 2018-08-09 NOTE — XMS REPORT
[facility]

 Created on: 2018



Boby Kely

External Reference #: [id number]

: 1935

Sex: Female



Demographics







 Address  [address] [place]  [address]

 

 Preferred Language  Unknown

 

 Marital Status  Unknown

 

 Hindu Affiliation  Unknown

 

 Race  Unknown

 

 Ethnic Group  Unknown





Author







 Author  ODIN GAUTHIER

 

 [facility]

 

 Address  [address] [place]  [address]



 

 Phone  [phone]







Care Team Providers







 Care Team Member Name  Role  Phone

 

 ODIN GAUTHIER  Unavailable  [phone]







PROBLEMS







 Type  Condition  ICD9-CM Code  LXM01-PV Code  Onset Dates  Condition Status  
SNOMED Code

 

 Problem  Hypertension     I10     Active  36772708

 

 Problem  Dementia in other diseases classified elsewhere without behavioral 
disturbance     F02.80     Active  246722072

 

 Problem  Ataxia     R27.0     Active  06568776

 

 Problem  Hyperlipidemia     E78.5     Active  72856936

 

 Problem  Delusional disorder     F22     Active  97061789

 

 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 
condition     F28     Active  12584631

 

 Problem  Psychosis, unspecified psychosis type     F29     Active  51647226

 

 Problem  Alzheimers disease with late onset     G30.1     Active  56440978

 

 Problem  Unspecified dementia without behavioral disturbance     F03.90     
Active  55586597

 

 Problem  Hallucinations     R44.3     Active  1789171







ALLERGIES

No Known Allergies



ENCOUNTERS







 Encounter  Location  Date  Diagnosis

 

 [place] [address] [place] [id number] [place] [address] 28210-
3006  08 Aug, 2018   

 

 [place] [id number] [place] [address] 13018-
[phone]    Hallucinations R44.3 ; Alzheimers disease with late onset 
G30.1 and Toe pain, right M79.674

 

 [facility]  [id number] [place] [address] [phone]-
[phone]     

 

 [facility]  [id number] [place] [address] 04870-
[phone]    Hallucinations R44.3

 

 [facility]  [id number] [place] [address] 30702-
5679     

 

 [facility]  [id number] [place] [address] 63709-
[phone]    Delusional disorder F22 and Psychosis, unspecified 
psychosis type F29

 

 [facility]  [id number] [place] [address] 35995-
[phone]     

 

 [facility]  [id number] [place] [address] 60076-
[phone]     

 

 [facility]  [id number] [place] [address] 29194-
6230  22 May, 2018  Local infection of the skin and subcutaneous tissue, 
unspecified L08.9 and Other injury of unspecified body region, initial 
encounter T14.8XXA

 

 [facility]  [id number] [place] [address] 39949-
3983  17 May, 2018   

 

 [facility]  [id number] [place] [address] 46379-
[phone]  10 May, 2018   

 

 [facility]  [id number] [place] [address] 50199-
[phone]    Hallucinations R44.3

 

 [facility]  [id number] [place] [address] 14685-
3112  19 Mar, 2018  Psychosis, unspecified psychosis type F29

 

 [facility]  [id number] [place] [address] [phone]-
[phone]  15 Mar, 2018   

 

 [facility]  [id number] [place] [address] 69420-
[phone]  13 Mar, 2018   

 

 [facility]  [id number] [place] [address] 86262-
6199    Unspecified dementia without behavioral disturbance F03.90 
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 
condition F28

 

 [facility]  [id number] [place] [address] 17114-
5125     

 

 [facility]  [address] [place] [address] 62943-
6241     

 

 [facility]  [id number] [place] [address] 90405-
4170     

 

 [facility]  [address] [place] [address] 32152-
8147    Dementia, unspecified, without behavioral disturbance F03.90

 

 [place] [id number] [place] [address] 24743-
6128  06 Dec, 2017  Medicare annual wellness visit, initial Z00.00 and 
Encounter for immunization Z23

 

 [place] [id number] [place] [address] 47210-
3274    Ataxia R27.0 and Hallucinations R44.3

 

 [place] [id number] [place] [address] 54426-
1958     

 

 [place] [id number] [place] [address] 62904-
[phone]  12 Oct, 2017  Encounter for immunization Z23

 

 [place] [id number] [place] [address] 79214-
7965  11 Sep, 2017  Hallucinations R44.3

 

 [place] [id number] [place] [address] 27721-
2789  05 Sep, 2017  Hallucinations R44.3

 

 [place] [id number] [place] [address] 98468-
2472  30 Aug, 2017  Arthralgia of left knee M25.562 ; Age-related nuclear 
cataract of both eyes H25.13 and Hallucinations R44.3

 

 [place] [id number] [place] [address] 22479-
0657  24 Aug, 2017   

 

 [place] [id number] [place] [address] 08275-
7245  14 Aug, 2017   

 

 [place] [id number] [place] [address] 15250-
5941    Hyperlipidemia E78.5 and Hypertension I10

 

 [place] [id number] [place] [address] 79647-
8433    Hyperlipidemia E78.5 and Hypertension I10

 

 [facility]  [id number] [place] [address] 50553-
[phone]     

 

 [facility]  [id number] [place] [address] 92936-
3713  15 2017  Hypertension I10 ; Alzheimers disease with late onset G30.1 
; Dementia in other diseases classified elsewhere without behavioral 
disturbance F02.80 and Ataxia R27.0

 

 [facility]  [id number] [place] [address] 45098-
[phone]  14 2016   

 

 [facility]  [id number] [place] [address] 74592-
8641     

 

 [facility]  [id number] [place] [address] 27374-
2354    Hypertension I10 and Ataxia R27.0

 

 [facility]  [id number] [place] [address] 52823-
5028  14 Oct, 2016   

 

 [facility]  [id number] [place] [address] 08667-
[phone]  26 Sep, 2016   

 

 [facility]  [id number] [place] [address] 80051-
[phone]  23 Sep, 2016   

 

 [facility]  [id number] [place] [address] 02566-
[phone]  23 Sep, 2016   

 

 [facility]  [id number] [place] [address] 11547-
[phone]  20 Sep, 2016   

 

 [facility]  [id number] [place] [address] 79615-
1326  16 Sep, 2016   

 

 [facility]  [id number] [place] [address] [phone]
[phone]  13 Aug, 2016  Urinary frequency R35.0 and Acute cystitis without 
hematuria N30.00

 

 [facility]  [id number] [place] [address] 21394-
1261     

 

 [facility]  [id number] [place] [address] 64357-
0941     

 

 [place] [id number] [place] [address] 14802-
4186  11 Mar, 2016  Hyperlipidemia E78.5

 

 [place] [address] [place] [phone]
[phone]  10 Mar, 2016  Ataxia R27.0 ; Hyperlipidemia E78.5 and Hypertension I10

 

 [address] [address]28-
[phone]     

 

 [place] [address] [address]32-
6173    Ataxia R27.0 ; Senile cataracts of both eyes H25.9 and 
Constipation, unspecified constipation type K59.00

 

 [address] 28114-
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

 

 [address] [phone]-
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

 

 [address] 26775-
4161  21 Aug, 2015  Dementia 294.20

 

 [place] [id number] [place] [address] 44102-
7437  13 Aug, 2015  Establishing care with new doctor, encounter for V65.8 ; 
Ataxia 781.3 ; Horizontal nystagmus 379.56 ; Hypertension 401.9 ; 
Hyperlipidemia 272.4 and History of TIA (transient ischemic attack) V12.54

 

 [place] [id number] [place] [address] 57927-
1256  06 Aug, 2015   

 

 [address] [address]690-
2766  05 Aug, 2015   







IMMUNIZATIONS

No Known Immunizations



SOCIAL HISTORY

Never Assessed



REASON FOR VISIT

Painful Urination, reports unable to void right now d/t voiding before coming. 
Denies pain with urination. Reports did have with frequent urination about a 
month ago, but has improved. Does report incontinence pads used this doesn't 
apear to be new. , Reports here because her eyes are not clear. reports 
cataract surgery on left eye 1 week ago, has appt to f/u with this tomorrow. , 
States, " the main reason I am here is because I have ghost in my house. There 
is a boy and a girl and they are under my head all night long. She reports they 
are always stinging her with different things and in different places. I am not 
as scared as I am troubled." Pt family with her confirms that he has seen them 
and has pictures on her phone. CBrumbackRN



PLAN OF CARE







 Activity  Details









  









 Follow Up  4 Weeks Reason:dementia







VITAL SIGNS







 Height  64 in  2018

 

 Weight  111.3 lbs  2018

 

 Temperature  98.0 degrees Fahrenheit  2018

 

 Heart Rate  76 bpm  2018

 

 Respiratory Rate  18   2018

 

 BMI  19.10 kg/m2  2018

 

 Blood pressure systolic  124 mmHg  2018

 

 Blood pressure diastolic  92 mmHg  2018







MEDICATIONS







 Medication  Instructions  Dosage  Frequency  Start Date  End Date  Duration  
Status

 

 Celexa 20 MG  Orally Once a day  1 tablet  24h        90 days  Not-Taking

 

 Fish Oil 1000 MG  Orally Twice a day  1 capsule  12h           Active

 

 Aspirin Low Dose 81 MG  Orally Once a day  1 tablet  24h           Not-Taking







RESULTS

No Results



PROCEDURES







 Procedure  Date Ordered  Result  Body Site

 

 [facility] VISIT ESTABLISHED PATIENT  2018      







INSTRUCTIONS





MEDICATIONS ADMINISTERED

No Known Medications



MEDICAL (GENERAL) HISTORY







 Type  Description  Date

 

 Medical History  coronary artery disease   

 

 Medical History  Carotid stenosis   

 

 Medical History  hypertension   

 

 Medical History  hyperlipidemia   

 

 Medical History  mitral valve regurgitation   

 

 Medical History  vitamin D deficiency   

 

 Medical History  nystagmus   

 

 Medical History  chest pain syndrome/palpitations   

 

 Medical History  ataxia/unsteady gait   

 

 Medical History  TIAs   

 

 Medical History  stress test 2012 EF 81%; 2014 EF 78%   

 

 Medical History  echo 2012 EF 60% mild MR,TR, AV stenosis; 2014 EF 60% 
aortic regurgitatio, MR, TR   

 

 Medical History  carotid duplex 2012 <40% Bilat; 2014 <40% bilat   

 

 Medical History  cataracts   

 

 Surgical History  left cataract surgery  2018

 

 Hospitalization History  falls   

 

 Hospitalization History  left hip pain, age-indeterminate pubic rami fracture--
[facility]  16

## 2018-08-09 NOTE — XMS REPORT
[facility]

 Created on: 2016



Boby Kely

External Reference #: [id number]

: 1935

Sex: Female



Demographics







 [place]  [facility] [address] [place]  [address]

 

 Home Phone  [phone]

 

 Preferred Language  Unknown

 

 Marital Status  Unknown

 

 Mu-ism Affiliation  Unknown

 

 Race  White

 

 Ethnic Group  Not  or 





Author







 Author  ODIN GAUTHIER

 

 Organization  [employer]

 

 Address  Unknown

 

 Phone  Unavailable







Care Team Providers







 Care Team Member Name  Role  Phone

 

 ODIN GAUTHIER  CP  Unavailable



                                                                



Allergies

          No Known Allergies                                                   
                                     



Problems

          





 Problem Type  Condition  Code  Onset Dates  Condition Status

 

 Problem  Ataxia  R27.0     Active

 

 Problem  Hypertension  I10     Active

 

 Problem  Hyperlipidemia  E78.5     Active

 

 Problem  Hypertension  401.9     Active

 

 Problem  Hyperlipidemia  272.4     Active

 

 Problem  Unspecified psychosis  298.9     Active

 

 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



                                                                               
                                                                     



Medications

          No Known Medications                                                 
                             



Results

          No Known Results                                                     
               



Summary Purpose

          [employer] Submission

## 2018-08-09 NOTE — XMS REPORT
Clinical Summary

 Created on: 2018



Adwoa Landisaremissy OLIVA

External Reference #: [id number]

: 1935

Sex: Female



Demographics







 Address  [address] [place] [address] [place]  [address]

 

 Home Phone  [phone]

 

 Preferred Language  English

 

 Marital Status  Unknown

 

 Alevism Affiliation  CAT

 

 Race  White

 

 Ethnic Group  Not  or 





Author







 Author  [facility]

 

 Organization  [facility]

 

 Address  Unknown

 

 Phone  Unavailable







Support







 Name  Relationship  Address  Phone

 

 Nikki Fraser  ECON  POA

Unknown  [phone]

 

 Keith Fraser  [employer]  [employer]

Unknown  [phone]







Care Team Providers







 Care Team Member Name  Role  Phone

 

 Ayush Cortez MD  Unavailable  Unavailable







Source Comments

Some departments are not documenting in the electronic medical record.  If you 
do not see the information that you expected, contact Release of Information in 
the Health Information Management department at [phone] for further 
assistance in locating additional records.[facility]



Allergies

No Known Allergies



Current Medications







      



  Prescription   Sig.   Disp.   Refills   Start   End Date   Status



      Date  

 

      



  acetaminophen (TYLENOL)   Take 325 mg by mouth       Active



  325 mg tablet   twice daily.     

 

      



  IBUPROFEN PO   Take  by mouth daily.       Active

 

      



  aspirin EC 81 mg tablet   Take 81 mg by mouth       Active



   daily.     

 

      



  Coenzyme Q10 (CO Q-10) 10   Take  by mouth daily.       Active



  mg cap      

 

      



  magnesium oxide 400 mg   Take  by mouth daily.       Active



  cap      

 

      



  nebivolol (BYSTOLIC) 5 mg   Take 5 mg by mouth daily.       Active



  tablet      

 

      



  lisinopril (PRINIVIL,   Take 40 mg by mouth       Active



  ZESTRIL) 40 mg tablet   daily.     

 

      



  citalopram (CELEXA) 20 mg   Take 20 mg by mouth       Active



  tablet   daily.     

 

      



  simvastatin (ZOCOR) 20 mg   Take 20 mg by mouth at       Active



  tablet   bedtime daily.     







Active Problems







 



  Problem   Noted Date

 

 



  Cerebellar degeneration   2015







Family History







   



  Medical History   Relation   Name   Comments

 

   



  Diabetes   Father  

 

   



  Hypertension   Father  

 

   



  Hypertension   Mother  









   



  Relation   Name   Status   Comments

 

   



  Father     

 

   



  Mother     







Social History







    



  Tobacco Use   Types   Packs/Day   Years Used   Date

 

    



  Never Smoker    

 

    



  Smokeless Tobacco: Never   



  Used   









   



  Alcohol Use   Drinks/Week   oz/Week   Comments

 

   



  No   0 Standard   0.0 



   drinks or  



   equivalent  









 



  Sex Assigned at Birth   Date Recorded

 

 



  Not on file 







Last Filed Vital Signs







  



  Vital Sign   Reading   Time Taken

 

  



  Blood Pressure   125/68   2016  2:13 PM CDT

 

  



  Pulse   61   2016  2:13 PM CDT

 

  



  Temperature   -   -

 

  



  Respiratory Rate   -   -

 

  



  Oxygen Saturation   -   -

 

  



  Inhaled Oxygen   -   -



  Concentration  

 

  



  Weight   57.2 kg (126 lb)   2016  2:13 PM CDT

 

  



  Height   162.6 cm (5' 4")   2016  2:13 PM CDT

 

  



  Body Mass Index   21.63   2016  2:13 PM CDT







Plan of Treatment







   



  Health Maintenance   Due Date   Last Done   Comments

 

   



  PHYSICAL (COMPREHENSIVE)   1942  



  EXAM   

 

   



  PERTUSSIS VACCINE   1946  

 

   



  TETANUS VACCINE   1952  

 

   



  SHINGLES RECOMBINANT   1985  



  VACCINE (1 of 2)   

 

   



  OSTEOPOROSIS SCREENING   2000  

 

   



  PNEUMONIA (PCV13/PPSV23)   2000  



  VACCINES (1 of 2 - PCV13)   

 

   



  INFLUENZA VACCINE   10/01/2018  







Results

Not on filefrom Last 3 Months

## 2018-08-09 NOTE — XMS REPORT
[facility]

 Created on: 2018



Kely Landis

External Reference #: [id number]

: 1935

Sex: Female



Demographics







 Address  [address] [place]  [address]

 

 Preferred Language  Unknown

 

 Marital Status  Unknown

 

 Mu-ism Affiliation  Unknown

 

 Race  Unknown

 

 Ethnic Group  Unknown





Author







 Author  SANCHEZ Gaines

 

 [facility]

 

 Address  [address] [place]  [address]



 

 Phone  [phone]







Care Team Providers







 Care Team Member Name  Role  Phone

 

 SANCHEZ Gaines  Unavailable  [phone]







PROBLEMS







 Type  Condition  ICD9-CM Code  ROC77-LR Code  Onset Dates  Condition Status  
SNOMED Code

 

 Problem  Hypertension     I10     Active  84587772

 

 Problem  Dementia in other diseases classified elsewhere without behavioral 
disturbance     F02.80     Active  173388547

 

 Problem  Ataxia     R27.0     Active  93414596

 

 Problem  Hyperlipidemia     E78.5     Active  57646632

 

 Problem  Delusional disorder     F22     Active  85811889

 

 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 
condition     F28     Active  33721889

 

 Problem  Psychosis, unspecified psychosis type     F29     Active  06756905

 

 Problem  Alzheimers disease with late onset     G30.1     Active  26670996

 

 Problem  Unspecified dementia without behavioral disturbance     F03.90     
Active  56559534

 

 Problem  Hallucinations     R44.3     Active  7004065







ALLERGIES

No Information



ENCOUNTERS







 Encounter  Location  Date  Diagnosis

 

 [facility]  [id number] [place] [address]-
2730     

 

 [facility]  [id number] [place] [address]-
3368    Hallucinations R44.3

 

 [facility]  [id number] [place] [address]-
7032  14 2018   

 

 [facility]  [id number] [place] [address]-
3497    Delusional disorder F22 and Psychosis, unspecified 
psychosis type F29

 

 [facility]  [id number] [place] [address]-
6863     

 

 [facility]  [address] [address]     

 

 [facility]  [address], [place] [address]  22 May, 2018  Local infection of the skin and subcutaneous tissue, 
unspecified L08.9 and Other injury of unspecified body region, initial 
encounter T14.8XXA

 

 [facility]  [address] [address]  17 May, 2018   

 

 [facility]  [id number] [place] [address]-
7050  10 May, 2018   

 

 [facility]  [address] [address]55    Hallucinations R44.3

 

 [facility]  [id number] [place] [address]-
6267  19 Mar, 2018  Psychosis, unspecified psychosis type F29

 

 [facility]  [address] [address]-
7547  15 Mar, 2018   

 

 [facility]  [address] [address]-
0933  13 Mar, 2018   

 

 [facility]  [id number] [address]-
0585    Unspecified dementia without behavioral disturbance F03.90 
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 
condition F28

 

 [facility]  [address] [address]-
[address]     

 

 [facility]  [address] [address]-
[address]     

 

 [facility]  [address] [address]-
[address]     

 

 [facility]  [address] [address]-
[address]6    Dementia, unspecified, without behavioral disturbance F03.90

 

 [facility]  [address] [address]-
[address]  06 Dec, 2017  Medicare annual wellness visit, initial Z00.00 and 
Encounter for immunization Z23

 

 [facility]  [id number] [place] [address]-
1636    Ataxia R27.0 and Hallucinations R44.3

 

 [facility]  [id number] [place] [address]-
4299     

 

 [facility]  [address] [address]-
[address]96  12 Oct, 2017  Encounter for immunization Z23

 

 [facility]  [id number] [place] [address]-
1437  11 Sep, 2017  Hallucinations R44.3

 

 [facility]  [id number] [place] [address]-
0288  05 Sep, 2017  Hallucinations R44.3

 

 [facility]  [id number] [place] [address]-
3383  30 Aug, 2017  Arthralgia of left knee M25.562 ; Age-related nuclear 
cataract of both eyes H25.13 and Hallucinations R44.3

 

 [facility]  [id number] [place] [address]-
3429  24 Aug, 2017   

 

 [facility]  [id number] [place] [address]-
4204  14 Aug, 2017   

 

 [facility]  [id number] [place] [address]-
7147    Hyperlipidemia E78.5 and Hypertension I10

 

 [facility]  [id number] [place] [address]-
5841    Hyperlipidemia E78.5 and Hypertension I10

 

 [facility]  [id number] [place] [address]-
1381     

 

 [facility]  [id number] [place] [address]-
7393  15 2017  Hypertension I10 ; Alzheimers disease with late onset G30.1 
; Dementia in other diseases classified elsewhere without behavioral 
disturbance F02.80 and Ataxia R27.0

 

 [facility]  [id number] [place] [address]-
4017  14 2016   

 

 [facility]  [id number] [place] [address] 7[address]-
7659  08 2016   

 

 [facility]  [id number] [place] [address]-
7048  07 2016  Hypertension I10 and Ataxia R27.0

 

 [facility]  [id number] [place] [address]-
0866  14 Oct, 2016   

 

 [facility]  [id number] [place] [address]-
5218  26 Sep, 2016   

 

 [facility]  [id number] [place] [address]-
3318  23 Sep, 2016   

 

 [facility]  [id number] [place] [address]-
7169  23 Sep, 2016   

 

 [facility]  [id number] [place] [address]-
9439  20 Sep, 2016   

 

 [facility]  [id number] [place] [address] 6[address]-
6700  16 Sep, 2016   

 

 [facility]  [address] [place] [address]
-9795  13 Aug, 2016  Urinary frequency R35.0 and Acute cystitis without 
hematuria N30.00

 

 [facility]  [id number] [place] [address]-
4459     

 

 [facility]  [id number] [place] [address] 9[address]-
6071     

 

 [facility]  [id number] [place] [address]-
3591  11 Mar, 2016  Hyperlipidemia E78.5

 

 [facility]  [id number] [place] [address]-
1585  10 Mar, 2016  Ataxia R27.0 ; Hyperlipidemia E78.5 and Hypertension I10

 

 [id number] [place] [address]-
4881     

 

 [address] [address]-
9966    Ataxia R27.0 ; Senile cataracts of both eyes H25.9 and 
Constipation, unspecified constipation type K59.00

 

 [address] [address]-
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

 

 [address] [address]-
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

 

 [address] [address]-
8178  21 Aug, 2015  Dementia 294.20

 

 [address] [address]-
5908  13 Aug, 2015  Establishing care with new doctor, encounter for V65.8 ; 
Ataxia 781.3 ; Horizontal nystagmus 379.56 ; Hypertension 401.9 ; 
Hyperlipidemia 272.4 and History of TIA (transient ischemic attack) V12.54

 

 [id number] [place] [address]-
4138  06 Aug, 2015   

 

 [id number] [place] [address]-
3287  05 Aug, 2015   







IMMUNIZATIONS

No Known Immunizations



SOCIAL HISTORY

Never Assessed



REASON FOR VISIT

medication



PLAN OF CARE





VITAL SIGNS





MEDICATIONS

Unknown Medications



RESULTS

No Results



PROCEDURES

No Known procedures



INSTRUCTIONS





MEDICATIONS ADMINISTERED

No Known Medications



MEDICAL (GENERAL) HISTORY







 Type  Description  Date

 

 Medical History  coronary artery disease   

 

 Medical History  Carotid stenosis   

 

 Medical History  hypertension   

 

 Medical History  hyperlipidemia   

 

 Medical History  mitral valve regurgitation   

 

 Medical History  vitamin D deficiency   

 

 Medical History  nystagmus   

 

 Medical History  chest pain syndrome/palpitations   

 

 Medical History  ataxia/unsteady gait   

 

 Medical History  TIAs   

 

 Medical History  stress test 2012 EF 81%; 2014 EF 78%   

 

 Medical History  echo 2012 EF 60% mild MR,TR, AV stenosis; 2014 EF 60% 
aortic regurgitatio, MR, TR   

 

 Medical History  carotid duplex 2012 <40% Bilat; 2014 <40% bilat   

 

 Medical History  cataracts   

 

 Surgical History  left cataract surgery  2018

 

 Hospitalization History  falls   

 

 Hospitalization History  left hip pain, age-indeterminate pubic rami fracture--
[facility]  16

## 2018-08-09 NOTE — XMS REPORT
[facility]

 Created on: 2017



LandisKely buck

External Reference #: [id number]

: 1935

Sex: Female



Demographics







 Address  [address]  [address]

 

 Preferred Language  Unknown

 

 Marital Status  Unknown

 

 Denominational Affiliation  Unknown

 

 Race  Unknown

 

 Ethnic Group  Unknown





Author







 Author  ODIN GAUTHIER

 

 [facility]

 

 Address  [address] [place]  [address]



 

 Phone  [phone]







Care Team Providers







 Care Team Member Name  Role  Phone

 

 ODIN GAUTHIER  Unavailable  [phone]







PROBLEMS







 Type  Condition  ICD9-CM Code  LRI47-PN Code  Onset Dates  Condition Status  
SNOMED Code

 

 Problem  Hyperlipidemia  272.4        Active  84262406

 

 Problem  Hyperlipidemia     E78.5     Active  15310948

 

 Problem  Ataxia     R27.0     Active  97766767

 

 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

 

 Problem  Hypertension  401.9        Active  52995217

 

 Problem  Hypertension     I10     Active  16472862

 

 Problem  Unspecified psychosis  298.9        Active  40119189







ALLERGIES

Unknown Allergies



SOCIAL HISTORY

No smoking Hx information available



PLAN OF CARE





VITAL SIGNS





MEDICATIONS

Unknown Medications



RESULTS

No Results



PROCEDURES

No Known procedures



IMMUNIZATIONS

No Known Immunizations

## 2018-08-09 NOTE — XMS REPORT
[facility]

 Created on: 2018



Boby Kely

External Reference #: [id number]

: 1935

Sex: Female



Demographics







 Address  [address] [place]  [address]

 

 Preferred Language  Unknown

 

 Marital Status  Unknown

 

 Mormonism Affiliation  Unknown

 

 Race  Unknown

 

 Ethnic Group  Unknown





Author







 Author  ODIN GAUTHIER

 

 [facility]

 

 Address  [address] [place]  [address]



 

 Phone  [phone]







Care Team Providers







 Care Team Member Name  Role  Phone

 

 ODIN GAUTHIER  Unavailable  [phone]







PROBLEMS







 Type  Condition  ICD9-CM Code  NZO07-UN Code  Onset Dates  Condition Status  
SNOMED Code

 

 Problem  Hypertension     I10     Active  50444280

 

 Problem  Dementia in other diseases classified elsewhere without behavioral 
disturbance     F02.80     Active  900113117

 

 Problem  Ataxia     R27.0     Active  39945652

 

 Problem  Hyperlipidemia     E78.5     Active  59865186

 

 Problem  Delusional disorder     F22     Active  29919846

 

 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 
condition     F28     Active  27879540

 

 Problem  Psychosis, unspecified psychosis type     F29     Active  39111002

 

 Problem  Alzheimers disease with late onset     G30.1     Active  64395589

 

 Problem  Unspecified dementia without behavioral disturbance     F03.90     
Active  26347860

 

 Problem  Hallucinations     R44.3     Active  7998416







ALLERGIES

No Known Allergies



ENCOUNTERS







 Encounter  Location  Date  Diagnosis

 

 [place] [id number] [place] [address]-
[phone]  04 Sep, 2018   

 

 [place] [address] [address]-
3035  08 Aug, 2018  Hallucinations R44.3 ; Hypertension I10 and Toe pain, right 
M79.674

 

 [place] [id number] [place] [address] 08758-
2862    Hallucinations R44.3 ; Alzheimers disease with late onset 
G30.1 and Toe pain, right M79.674

 

 [place] [address] [address]-
9045     

 

 [place] [address] [address]-
9682    Hallucinations R44.3

 

 [facility]  [address], [place] [address]-
9848  14 2018   

 

 [facility]  [id number] [place] [address] 14150-
6172  13 2018  Delusional disorder F22 and Psychosis, unspecified 
psychosis type F29

 

 [facility]  [id number] [place] [address] 21254-
[phone]     

 

 [facility]  [id number] [place] [address] 81520-
7453     

 

 [facility]  [id number] [place] [address] 58926-
2510  22 May, 2018  Local infection of the skin and subcutaneous tissue, 
unspecified L08.9 and Other injury of unspecified body region, initial 
encounter T14.8XXA

 

 [facility]  [id number] [place] [address]200-
6475  17 May, 2018   

 

 [facility]  [id number] [place] [address] 53084-
7024  10 May, 2018   

 

 [facility]  [id number] [place] [address] 33735-
0164    Hallucinations R44.3

 

 [facility]  [id number] [place] [address] 77931-
2694  19 Mar, 2018  Psychosis, unspecified psychosis type F29

 

 [facility]  [address], [place] [address]-
8168  15 Mar, 2018   

 

 [facility]  [address] [place] [address] 20701-
6807  13 Mar, 2018   

 

 [facility]  [id number] [place] [address] 56598-
3134    Unspecified dementia without behavioral disturbance F03.90 
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 
condition F28

 

 [facility]  [address] [place] [address] 69751-
4765     

 

 [facility]  [id number] [place] [address] 00360-
5331     

 

 [place] [id number] [place] [address] 82408-
1808     

 

 [place] [id number] [place] [address] 66553-
2270    Dementia, unspecified, without behavioral disturbance F03.90

 

 [place] [id number] [place] [address] 64910-
0110  06 Dec, 2017  Medicare annual wellness visit, initial Z00.00 and 
Encounter for immunization Z23

 

 [place] [address] [address]-
0061    Ataxia R27.0 and Hallucinations R44.3

 

 [place] [address] [address]-
0457     

 

 [place] [id number] [place] [address] 57367-
7427  12 Oct, 2017  Encounter for immunization Z23

 

 [place] [id number] [place] [address] 55829-
8304  11 Sep, 2017  Hallucinations R44.3

 

 [place] [id number] [place] [address] 21144-
2756  05 Sep, 2017  Hallucinations R44.3

 

 [place] [id number] [place] [address] 43327-
9810  30 Aug, 2017  Arthralgia of left knee M25.562 ; Age-related nuclear 
cataract of both eyes H25.13 and Hallucinations R44.3

 

 [place] [id number] [place] [address] 93047-
1604  24 Aug, 2017   

 

 [place] [id number] [place] [address] 26079-
6829  14 Aug, 2017   

 

 [place] [address] [place] [address] 46508-
6102    Hyperlipidemia E78.5 and Hypertension I10

 

 [facility]  [id number] [place] [address] 27349-
9811  16 2017  Hyperlipidemia E78.5 and Hypertension I10

 

 [facility]  [id number] [place] [address] 66342-
2724     

 

 [facility]  [id number] [place] [address] 57178-
1119  15 Feb, 2017  Hypertension I10 ; Alzheimers disease with late onset G30.1 
; Dementia in other diseases classified elsewhere without behavioral 
disturbance F02.80 and Ataxia R27.0

 

 [facility]  [address] [address]-
4150  14 2016   

 

 [facility]  [id number] [place] [address] 54601-
7823     

 

 [facility]  [address] [address]-
[phone]    Hypertension I10 and Ataxia R27.0

 

 [facility]  [id number] [place] [address] 87453-
9115  14 Oct, 2016   

 

 [facility]  [id number] [place] [address] 96981-
2095  26 Sep, 2016   

 

 [facility]  [id number] [place] [address] 10509-
3928  23 Sep, 2016   

 

 [facility]  [id number] [place] [address] 83753-
2195  23 Sep, 2016   

 

 [facility]  [id number] [place] [address] 11929-
5654  20 Sep, 2016   

 

 [facility]  [id number] [place] [address] 73700-
9468  16 Sep, 2016   

 

 [facility]  [id number] [place] [address] [phone]
[phone]  13 Aug, 2016  Urinary frequency R35.0 and Acute cystitis without 
hematuria N30.00

 

 [place] [address] [place] [address] 25479-
5220     

 

 [place] [id number] [place] [address] 92395-
1390     

 

 [facility]  [address] [place] [address] 74467-
1138  11 Mar, 2016  Hyperlipidemia E78.5

 

 [place] [address] [address]-
0645  10 Mar, 2016  Ataxia R27.0 ; Hyperlipidemia E78.5 and Hypertension I10

 

 [place] [address] [place] [address] 38419-
6789     

 

 [place] [address] [place] [address] 19294-
7203    Ataxia R27.0 ; Senile cataracts of both eyes H25.9 and 
Constipation, unspecified constipation type K59.00

 

 [place] [address] [place] [address] 25205-
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

 

 [place] [address] [place] [address] 50946-
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

 

 [place] [address] [place] [address] 87315-
6446  21 Aug, 2015  Dementia 294.20

 

 [place] [address] [place] [address] 61106-
9056  13 Aug, 2015  Establishing care with new doctor, encounter for V65.8 ; 
Ataxia 781.3 ; Horizontal nystagmus 379.56 ; Hypertension 401.9 ; 
Hyperlipidemia 272.4 and History of TIA (transient ischemic attack) V12.54

 

 [place] [id number] [place] [address] 25254-
9701  06 Aug, 2015   

 

 [place] [address] [address]-
9182  05 Aug, 2015   







IMMUNIZATIONS

No Known Immunizations



SOCIAL HISTORY

Never Assessed



REASON FOR VISIT

[facility]----Melissa



PLAN OF CARE







 Activity  Details









  









 Follow Up  3 Months Reason:hallucinations







VITAL SIGNS







 Height  64 in  2018

 

 Weight  114 lbs  2018

 

 Temperature  98.1 degrees Fahrenheit  2018

 

 Heart Rate  70 bpm  2018

 

 Respiratory Rate  20   2018

 

 BMI  19.57 kg/m2  2018

 

 Blood pressure systolic  124 mmHg  2018

 

 Blood pressure diastolic  62 mmHg  2018







MEDICATIONS







 Medication  Instructions  Dosage  Frequency  Start Date  End Date  Duration  
Status

 

 Fish Oil 1000 MG  Orally Twice a day  1 capsule  12h           Active

 

 Aspirin Low Dose 81 MG  Orally Once a day  1 tablet  24h           Active

 

 Lexapro 10 mg  Orally Once a day  1/2 tablet  24h  19 Mar, 2018        Active

 

 Vitamin D 1000 UNIT  Orally Once a day  1 tablet  24h           Active







RESULTS

No Results



PROCEDURES







 Procedure  Date Ordered  Result  Body Site

 

 [facility] VISIT ESTABLISHED PATIENT  2018      







INSTRUCTIONS





MEDICATIONS ADMINISTERED

No Known Medications



MEDICAL (GENERAL) HISTORY







 Type  Description  Date

 

 Medical History  coronary artery disease   

 

 Medical History  Carotid stenosis   

 

 Medical History  hypertension   

 

 Medical History  hyperlipidemia   

 

 Medical History  mitral valve regurgitation   

 

 Medical History  vitamin D deficiency   

 

 Medical History  nystagmus   

 

 Medical History  chest pain syndrome/palpitations   

 

 Medical History  ataxia/unsteady gait   

 

 Medical History  TIAs   

 

 Medical History  stress test 2012 EF 81%; 2014 EF 78%   

 

 Medical History  echo 2012 EF 60% mild MR,TR, AV stenosis; 2014 EF 60% 
aortic regurgitatio, MR, TR   

 

 Medical History  carotid duplex 2012 <40% Bilat; 2014 <40% bilat   

 

 Medical History  cataracts   

 

 Surgical History  left cataract surgery  2018

 

 Hospitalization History  falls   

 

 Hospitalization History  left hip pain, age-indeterminate pubic rami fracture--
[facility]  16

## 2018-08-09 NOTE — XMS REPORT
[facility]

 Created on: 2015



Kely Kulkarni

External Reference #: [id number]

: 1935

Sex: Female



Demographics







 Address  [address] [place] [address] [place]  [address]

 

 Home Phone  [phone]

 

 Preferred Language  Unknown

 

 Marital Status  Unknown

 

 Anglican Affiliation  Unknown

 

 Race  Unreported/Refused to Report

 

 Ethnic Group  Not  or 





Author







 Author  ODIN GAUTHIER

 

 [facility]  [employer]

 

 Address  Unknown

 

 Phone  Unavailable







Care Team Providers







 Care Team Member Name  Role  Phone

 

 ODIN GAUTHIER  CP  Unavailable



                                                                



Allergies, Adverse Reactions, Alerts

          





 Substance  Reaction  Event Type

 

 N.K.D.A.  Info Not Available  Non Drug Allergy



                                                                               
         



Problems

          





 Problem Type  Condition  ICD-9 Code  Onset Dates  Condition Status

 

 Assessment  History of TIA (transient ischemic attack)  V12.54     Active

 

 Problem  Hyperlipidemia  272.4     Active

 

 Assessment  Establishing care with new doctor, encounter for  V65.8     Active

 

 Problem  Hypertension  401.9     Active

 

 Assessment  Hypertension  401.9     Active

 

 Assessment  Hyperlipidemia  272.4     Active

 

 Assessment  Ataxia  781.3     Active

 

 Assessment  Horizontal nystagmus  379.56     Active



                                                                               
                                                                               



Medications

          





 Medication  Code System  Code  Instructions  Start Date  End Date  Status  
Dosage

 

 Fish Oil  NDC  [address]-82  1000 MG Orally Twice a day           1 capsule

 

 Bystolic  NDC  [address]-81  5 MG Orally Once a day           1 tablet

 

 Coenzyme Q10  NDC  [address]-06  10 MG Orally Once a day           1 capsule 
with a meal

 

 Tylenol  NDC  [address]-47  325 MG Orally 2 times a day           1 tablet as 
needed

 

 Celexa  NDC  [address]-08  20 MG Orally Once a day           1 tablet

 

 Simvastatin  NDC  [address]-10  20 MG Orally Once a day           1 tablet in 
the evening

 

 Aspirin Low Dose  NDC  [address]-40  81 MG Orally Once a day           1 
tablet

 

 Lisinopril  NDC  [address]-01  40 MG Orally Once a day           1 tablet

 

 Ibuprofen  NDC  [address]-09  200 MG Orally Once a day           1 tablet as 
needed

 

 Magnesium Oxide  NDC  [address]-40  400 MG Orally Once a day           not 
defined

 

 Advil  NDC  [address]-98  200 MG Orally every 6 hrs           1 tablet as 
needed



                                                                               
                                                                               
                              



Procedures

          





 Procedure  Coding System  Code  Date

 

 Office Visit, New Pt., Level 3  CPT-4  72639  Aug 13, 2015

 

 [facility] VISIT NEW PATIENT  CPT-4    Aug 13, 2015



                                                                               
                             



Vital Signs

          





 Date/Time:  Aug 13, 2015

 

 Temperature  98.1 F

 

 Weight  115.5 lbs

 

 Height  64 in

 

 BMI  19.82 Index

 

 Blood Pressure Diastolic  74 mmHg

 

 Blood Pressure Systolic  132 mmHg

 

 Cardiac Monitoring Heart Rate  68 bpm



                                                                              



Results

          No Known Results                                                     
               



Summary Purpose

          [employer] Submission

## 2018-08-09 NOTE — XMS REPORT
[facility]

 Created on: 2016



Boby Kely

External Reference #: [id number]

: 1935

Sex: Female



Demographics







 Address  [facility] [address] [place]  [address]

 

 Home Phone  [phone]

 

 Preferred Language  Unknown

 

 Marital Status  Unknown

 

 Mandaeism Affiliation  Unknown

 

 Race  Unreported/Refused to Report

 

 Ethnic Group  Not  or 





Author







 Author  ODIN GAUTHIER

 

 Organization  [employer]

 

 Address  Unknown

 

 Phone  Unavailable







Care Team Providers







 Care Team Member Name  Role  Phone

 

 ODIN GAUTHIER  CP  Unavailable



                                                                



Allergies

          No Known Allergies                                                   
                                     



Problems

          





 Problem Type  Condition  Code  Onset Dates  Condition Status

 

 Problem  Ataxia  R27.0     Active

 

 Problem  Hypertension  I10     Active

 

 Problem  Hyperlipidemia  E78.5     Active

 

 Problem  Hypertension  401.9     Active

 

 Problem  Hyperlipidemia  272.4     Active

 

 Problem  Unspecified psychosis  298.9     Active

 

 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



                                                                               
                                                                     



Medications

          





 Medication  Code System  Code  Instructions  Start Date  End Date  Status  
Dosage

 

 Lisinopril  NDC  [address]-78  40 MG Orally Once a day           1 tablet



                                                                              



Results

          No Known Results                                                     
               



Summary Purpose

          [employer]Clinical[employer] Submission

## 2018-08-09 NOTE — XMS REPORT
[facility]

 Created on: 2018



Boby Kely

External Reference #: [id number]

: 1935

Sex: Female



Demographics







 Address  [address] [place]  [address]

 

 Preferred Language  Unknown

 

 Marital Status  Unknown

 

 Shinto Affiliation  Unknown

 

 Race  Unknown

 

 Ethnic Group  Unknown





Author







 Author  ODIN GAUTHIER

 

 [facility]

 

 Address  [address] [place]  [address]



 

 Phone  [phone]







Care Team Providers







 Care Team Member Name  Role  Phone

 

 ODIN GAUTHIER  Unavailable  [phone]







PROBLEMS







 Type  Condition  ICD9-CM Code  GML03-SD Code  Onset Dates  Condition Status  
SNOMED Code

 

 Problem  Ataxia     R27.0     Active  13868856

 

 Problem  Hypertension     I10     Active  31310802

 

 Problem  Hyperlipidemia     E78.5     Active  45766967

 

 Problem  Unspecified dementia without behavioral disturbance     F03.90     
Active  38027300

 

 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 
condition     F28     Active  43656549

 

 Problem  Alzheimers disease with late onset     G30.1     Active  56164249

 

 Problem  Dementia in other diseases classified elsewhere without behavioral 
disturbance     F02.80     Active  738339227

 

 Problem  Hallucinations     R44.3     Active  7051544

 

 Problem  Psychosis, unspecified psychosis type     F29     Active  30532378







ALLERGIES

No Known Allergies



ENCOUNTERS







 Encounter  Location  Date  Diagnosis

 

 [place] [address] [place] [id number] [place] [address] 46943-
8259     

 

 [place] [id number] [place] [address] 14513-
1223  22 May, 2018  Local infection of the skin and subcutaneous tissue, 
unspecified L08.9 and Other injury of unspecified body region, initial 
encounter T14.8XXA

 

 [facility]  [address] [place] [address] 02865-
8323  17 May, 2018   

 

 [facility]  [id number] [place] [address] 44405-
4590  10 May, 2018   

 

 [facility]  [id number] [place] [address] 97995-
3299    Hallucinations R44.3

 

 [facility]  [id number] [place] [address] 50729-
4834  19 Mar, 2018  Psychosis, unspecified psychosis type F29

 

 [facility]  [address], [place] [address]-
8510  15 Mar, 2018   

 

 [facility]  [id number] [place] [address] 78198-
1269  13 Mar, 2018   

 

 [facility]  [id number] [place] [address] 63235-
8264    Unspecified dementia without behavioral disturbance F03.90 
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 
condition F28

 

 [facility]  [id number] [place] [address] 43030-
3046     

 

 [facility]  [id number] [place] [address] 86474-
1165     

 

 [facility]  [id number] [place] [address] 45728-
0568     

 

 [facility]  [id number] [place] [address] 49061-
7343    Dementia, unspecified, without behavioral disturbance F03.90

 

 [place] [id number] [place] [address] 12128-
6829  06 Dec, 2017  Medicare annual wellness visit, initial Z00.00 and 
Encounter for immunization Z23

 

 [place] [id number] [place] [address] 14025-
0092    Ataxia R27.0 and Hallucinations R44.3

 

 [facility]  [address], [place] [address]-
0085     

 

 [facility]  [id number] [place] [address] 16674-
3380  12 Oct, 2017  Encounter for immunization Z23

 

 [facility]  [id number] [place] [address] 61515-
8077  11 Sep, 2017  Hallucinations R44.3

 

 [facility]  [id number] [place] [address] 54742-
8418  05 Sep, 2017  Hallucinations R44.3

 

 [facility]  [id number] [place] [address] 05564-
6802  30 Aug, 2017  Arthralgia of left knee M25.562 ; Age-related nuclear 
cataract of both eyes H25.13 and Hallucinations R44.3

 

 [facility]  [id number] [place] [address] 90836-
2916  24 Aug, 2017   

 

 [facility]  [address] [address]-
0461  14 Aug, 2017   

 

 [facility]  [id number] [place] [address] 14368-
5134    Hyperlipidemia E78.5 and Hypertension I10

 

 [place] [id number] [place] [address] 73613-
9797    Hyperlipidemia E78.5 and Hypertension I10

 

 [place] [address] 23264-
8171     

 

 [facility]  [id number] [place] [address] 76552-
9892  15 Feb, 2017  Hypertension I10 ; Alzheimers disease with late onset G30.1 
; Dementia in other diseases classified elsewhere without behavioral 
disturbance F02.80 and Ataxia R27.0

 

 [place] [id number] [place] [address] 76857-
0336  14 2016   

 

 [facility]  [id number] [place] [address] 31858-
3615     

 

 [facility]  [id number] [place] [address] 32712-
1194    Hypertension I10 and Ataxia R27.0

 

 [place] [address] 74105-
9173  14 Oct, 2016   

 

 [facility]  [id number] [place] [address] 79982-
1916  26 Sep, 2016   

 

 [facility]  [address] [place] [address]-
8096  23 Sep, 2016   

 

 [facility]  [id number] [place] [address] 57468-
7110  23 Sep, 2016   

 

 [facility]  [id number] [place] [address] 84607-
7155  20 Sep, 2016   

 

 [facility]  [id number] [place] [address] 18099-
1613  16 Sep, 2016   

 

 [facility]  [id number] [place] [address] 39592
-[phone]  13 Aug, 2016  Urinary frequency R35.0 and Acute cystitis without 
hematuria N30.00

 

 [facility]  [id number] [place] [address] 49066-
0657     

 

 [facility]  [id number] [place] [address] 08595-
2211     

 

 [facility]  [id number] [place] [address] 71123-
4020  11 Mar, 2016  Hyperlipidemia E78.5

 

 [facility]  [id number] [place] [address] 03581-
9066  10 Mar, 2016  Ataxia R27.0 ; Hyperlipidemia E78.5 and Hypertension I10

 

 [facility]  [id number] [place] [address] 00285-
2183     

 

 [facility]  [id number] [place] [address] 63925-
6716    Ataxia R27.0 ; Senile cataracts of both eyes H25.9 and 
Constipation, unspecified constipation type K59.00

 

 [facility]  [id number] [place] [address] 93310-
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

 

 [facility]  [id number] [place] [address] 31300-
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

 

 [facility]  [id number] [place] [address]-
1686  21 Aug, 2015  Dementia 294.20

 

 [place] [address] [facility] [id number] [address]-
9541  13 Aug, 2015  Establishing care with new doctor, encounter for V65.8 ; 
Ataxia 781.3 ; Horizontal nystagmus 379.56 ; Hypertension 401.9 ; 
Hyperlipidemia 272.4 and History of TIA (transient ischemic attack) V12.54

 

 [place] [address] [facility] [id number] [address]-
3931  06 Aug, 2015   

 

 [place] [address] [facility] [id number] [address]-
4556  05 Aug, 2015   







IMMUNIZATIONS







 Vaccine  Route  Administration Date  Status

 

 PCV 13  IM Intramuscular  Dec 06, 2017  Administered







SOCIAL HISTORY

Never Assessed



REASON FOR VISIT

medicare physical, [facility]



PLAN OF CARE







 Activity  Details









  









 Follow Up  annually for preventive care, sooner for chronic health maintenance
, prn Reason:







VITAL SIGNS







 Height  64 in  2017

 

 Weight  114.6 lbs  2017

 

 Temperature  98.0 degrees Fahrenheit  2017

 

 Heart Rate  76 bpm  2017

 

 Respiratory Rate  18   2017

 

 BMI  19.67 kg/m2  2017

 

 Blood pressure systolic  120 mmHg  2017

 

 Blood pressure diastolic  76 mmHg  2017







MEDICATIONS







 Medication  Instructions  Dosage  Frequency  Start Date  End Date  Duration  
Status

 

 Fish Oil 1000 MG  Orally Twice a day  1 capsule  12h           Active

 

 Aspirin Low Dose 81 MG  Orally Once a day  1 tablet  24h           Active

 

 Celexa 20 MG  Orally Once a day  1 tablet  24h        90 days  Not-Taking







RESULTS

No Results



PROCEDURES







 Procedure  Date Ordered  Result  Body Site

 

 ANNUAL WELLNES VST; PERSNL PPS INIT  Dec 06, 2017      

 

 FALL RISK ASSESSMENT DOCD  Dec 06, 2017      

 

 PCV 13  Dec 06, 2017      

 

 PT TOBACCO SCREEN RCVD TLK  Dec 06, 2017      

 

 SINGLE IMMUNIZATION ADMIN  Dec 06, 2017      







INSTRUCTIONS





MEDICATIONS ADMINISTERED

No Known Medications



MEDICAL (GENERAL) HISTORY







 Type  Description  Date

 

 Medical History  coronary artery disease   

 

 Medical History  Carotid stenosis   

 

 Medical History  hypertension   

 

 Medical History  hyperlipidemia   

 

 Medical History  mitral valve regurgitation   

 

 Medical History  vitamin D deficiency   

 

 Medical History  nystagmus   

 

 Medical History  chest pain syndrome/palpitations   

 

 Medical History  ataxia/unsteady gait   

 

 Medical History  TIAs   

 

 Medical History  stress test 2012 EF 81%; 2014 EF 78%   

 

 Medical History  echo 2012 EF 60% mild MR,TR, AV stenosis; 2014 EF 60% 
aortic regurgitatio, MR, TR   

 

 Medical History  carotid duplex 2012 <40% Bilat; 2014 <40% bilat   

 

 Medical History  cataracts   

 

 Surgical History  left cataract surgery  2018

 

 Hospitalization History  falls   

 

 Hospitalization History  left hip pain, age-indeterminate pubic rami fracture--
[facility]  16

## 2018-08-09 NOTE — XMS REPORT
[facility]

 Created on: 2018



Boby Kely

External Reference #: [id number]

: 1935

Sex: Female



Demographics







 Address  [address] [place]  [address]

 

 Preferred Language  Unknown

 

 Marital Status  Unknown

 

 Oriental orthodox Affiliation  Unknown

 

 Race  Unknown

 

 Ethnic Group  Unknown





Author







 Author  ODIN GAUTHIER

 

 [facility]

 

 Address  [address] [place]  [address]



 

 Phone  [phone]







Care Team Providers







 Care Team Member Name  Role  Phone

 

 ODIN GAUTHIER  Unavailable  [phone]







PROBLEMS







 Type  Condition  ICD9-CM Code  IMA49-LF Code  Onset Dates  Condition Status  
SNOMED Code

 

 Problem  Ataxia     R27.0     Active  32326096

 

 Problem  Hypertension     I10     Active  81505016

 

 Problem  Hyperlipidemia     E78.5     Active  38397713

 

 Problem  Unspecified dementia without behavioral disturbance     F03.90     
Active  94634075

 

 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 
condition     F28     Active  41757736

 

 Problem  Alzheimers disease with late onset     G30.1     Active  00470263

 

 Problem  Dementia in other diseases classified elsewhere without behavioral 
disturbance     F02.80     Active  583199217

 

 Problem  Hallucinations     R44.3     Active  1034051

 

 Problem  Psychosis, unspecified psychosis type     F29     Active  78806654







ALLERGIES

No Information



ENCOUNTERS







 Encounter  Location  Date  Diagnosis

 

 [place] [address] [address]-
1759     

 

 [facility]  [id number] [place] [address] 54019-
9519  19 Mar, 2018  Psychosis, unspecified psychosis type F29

 

 [facility]  [id number] [place] [address] 75102-
2144  15 Mar, 2018   

 

 [facility]  [id number] [place] [address] 42328-
5851  13 Mar, 2018   

 

 [facility]  [address] [address]-
0343    Unspecified dementia without behavioral disturbance F03.90 
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 
condition F28

 

 [facility]  [address] 11415-
8967     

 

 [facility]  [id number] [place] [address] 83367-
7692     

 

 [place] [address] 8[address]-
6927     

 

 [facility]  [address] 86902-
4750    Dementia, unspecified, without behavioral disturbance F03.90

 

 [place] [address] 76526-
4535  06 Dec, 2017  Encounter for immunization Z23 and Medicare annual wellness 
visit, initial Z00.00

 

 [place] [address] [address]142-
0095    Ataxia R27.0 and Hallucinations R44.3

 

 [place] [address] 40735-
8476     

 

 [place] [address] 38491-
4309  12 Oct, 2017  Encounter for immunization Z23

 

 [place] [address] 52690-
3711  11 Sep, 2017  Hallucinations R44.3

 

 [place] [address] 16105-
6864  05 Sep, 2017  Hallucinations R44.3

 

 [place] [address] 38161-
3363  30 Aug, 2017  Arthralgia of left knee M25.562 ; Age-related nuclear 
cataract of both eyes H25.13 and Hallucinations R44.3

 

 [place] [address] 15094-
5529  24 Aug, 2017   

 

 [place] [address] 02916-
0760  14 Aug, 2017   

 

 [place] [address] 00309-
0576    Hyperlipidemia E78.5 and Hypertension I10

 

 [facility]  [id number] [place] [address] 46042-
7366    Hyperlipidemia E78.5 and Hypertension I10

 

 [facility]  [id number] [place] [address] 10216-
9000     

 

 [facility]  [id number] [place] [address] 10226-
3470  15 2017  Hypertension I10 ; Alzheimers disease with late onset G30.1 
; Dementia in other diseases classified elsewhere without behavioral 
disturbance F02.80 and Ataxia R27.0

 

 [facility]  [id number] [place] [address] 90603-
6358  14 2016   

 

 [facility]  [id number] [place] [address] 37600-
3316  08 2016   

 

 [facility]  [id number] [place] [address] 36078-
2104    Hypertension I10 and Ataxia R27.0

 

 [facility]  [id number] [place] [address] 52846-
1387  14 Oct, 2016   

 

 [facility]  [id number] [place] [address] 93749-
6646  26 Sep, 2016   

 

 [facility]  [id number] [place] [address] 20559-
0201  23 Sep, 2016   

 

 [facility]  [id number] [place] [address] 17091-
6682  23 Sep, 2016   

 

 [facility]  [id number] [place] [address] 19771-
1119  20 Sep, 2016   

 

 [facility]  [id number] [place] [address] 09830-
1677  16 Sep, 2016   

 

 [facility]  [address] [place] [address] 32285
-[phone]  13 Aug, 2016  Urinary frequency R35.0 and Acute cystitis without 
hematuria N30.00

 

 [place] [address], [place] [address]-
1453     

 

 [place] [id number] [place] [address] 70581-
3976     

 

 [place] [id number] [place] [address] 15926-
5663  11 Mar, 2016  Hyperlipidemia E78.5

 

 [place] [id number] [place] [address] 01564-
0560  10 Mar, 2016  Ataxia R27.0 ; Hyperlipidemia E78.5 and Hypertension I10

 

 [place] [id number] [place] [address] 06991-
4655     

 

 [place] [id number] [place] [address] 97995-
9546    Ataxia R27.0 ; Senile cataracts of both eyes H25.9 and 
Constipation, unspecified constipation type K59.00

 

 [place] [id number] [place] [address] 05995-
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

 

 [place] [id number] [place] [address] 93934-
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

 

 [place] [id number] [place] [address] 01202-
8694  21 Aug, 2015  Dementia 294.20

 

 [place] [id number] [place] [address] 79734-
6366  13 Aug, 2015  Establishing care with new doctor, encounter for V65.8 ; 
Ataxia 781.3 ; Horizontal nystagmus 379.56 ; Hypertension 401.9 ; 
Hyperlipidemia 272.4 and History of TIA (transient ischemic attack) V12.54

 

 [place] [address] [place] [address] 98501-
4655  06 Aug, 2015   

 

 [place] [id number] [place] [address] 46409-
0939  05 Aug, 2015   







IMMUNIZATIONS

No Known Immunizations



SOCIAL HISTORY

Never Assessed



REASON FOR VISIT

Requests return call



PLAN OF CARE





VITAL SIGNS





MEDICATIONS

Unknown Medications



RESULTS

No Results



PROCEDURES

No Known procedures



INSTRUCTIONS





MEDICATIONS ADMINISTERED

No Known Medications



MEDICAL (GENERAL) HISTORY







 Type  Description  Date

 

 Medical History  coronary artery disease   

 

 Medical History  Carotid stenosis   

 

 Medical History  hypertension   

 

 Medical History  hyperlipidemia   

 

 Medical History  mitral valve regurgitation   

 

 Medical History  vitamin D deficiency   

 

 Medical History  nystagmus   

 

 Medical History  chest pain syndrome/palpitations   

 

 Medical History  ataxia/unsteady gait   

 

 Medical History  TIAs   

 

 Medical History  stress test 2012 EF 81%; 2014 EF 78%   

 

 Medical History  echo 2012 EF 60% mild MR,TR, AV stenosis; 2014 EF 60% 
aortic regurgitatio, MR, TR   

 

 Medical History  carotid duplex 2012 <40% Bilat; 2014 <40% bilat   

 

 Medical History  cataracts   

 

 Surgical History  left cataract surgery  2018

 

 Hospitalization History  falls   

 

 Hospitalization History  left hip pain, age-indeterminate pubic rami fracture--
[facility]  16

## 2018-08-09 NOTE — XMS REPORT
[facility]

 Created on: 2016



Kely Landis

External Reference #: [id number]

: 1935

Sex: Female



Demographics







 Address  [address] [place] [address] [place]  [address]

 

 Home Phone  [phone]

 

 Preferred Language  Unknown

 

 Marital Status  Unknown

 

 Roman Catholic Affiliation  Unknown

 

 Race  Unreported/Refused to Report

 

 Ethnic Group  Not  or 





Author







 Author  ODIN GAUTHIER

 

 Organization  [employer]

 

 Address  Unknown

 

 Phone  Unavailable







Care Team Providers







 Care Team Member Name  Role  Phone

 

 ODIN GAUTHIER  CP  Unavailable



                                                                



Allergies

          No Known Allergies                                                   
                                     



Problems

          





 Problem Type  Condition  Code  Onset Dates  Condition Status

 

 Problem  Ataxia  R27.0     Active

 

 Problem  Hypertension  I10     Active

 

 Problem  Hyperlipidemia  E78.5     Active

 

 Problem  Hypertension  401.9     Active

 

 Problem  Hyperlipidemia  272.4     Active

 

 Problem  Unspecified psychosis  298.9     Active

 

 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



                                                                               
                                                                     



Medications

          





 Medication  Code System  Code  Instructions  Start Date  End Date  Status  
Dosage

 

 Colace  NDC  [address]-24  100 MG Orally (Home Delivery) Once a day may 
repeat if needed           1 capsule as needed for constipation

 

 Simvastatin  NDC  [address]-10  20 mg Orally  (Home Delivery) Once a day     
      1 tablet in the evening

 

 Celexa  NDC  [address]-61  20 mg Orally (Home Delivery) Once a day           
1 tablet

 

 Bystolic  NDC  [address]-65  5 MG Orally (Home Delivery) Once a day           
1 tablet



                                                                               
                             



Results

          No Known Results                                                     
               



Summary Purpose

          [employer] Submission

## 2018-08-09 NOTE — XMS REPORT
[facility]

 Created on: 2018



Kely Landis

External Reference #: [id number]

: 1935

Sex: Female



Demographics







 Address  [address] [place]  [address]

 

 Preferred Language  Unknown

 

 Marital Status  Unknown

 

 Oriental orthodox Affiliation  Unknown

 

 Race  Unknown

 

 Ethnic Group  Unknown





Author







 Author  SANCHEZ Gaines

 

 [facility]

 

 Address  [address] [place]  [address]



 

 Phone  [phone]







Care Team Providers







 Care Team Member Name  Role  Phone

 

 SANCHEZ Gaines  Unavailable  [phone]







PROBLEMS







 Type  Condition  ICD9-CM Code  ICE15-QA Code  Onset Dates  Condition Status  
SNOMED Code

 

 Problem  Hypertension     I10     Active  40669352

 

 Problem  Dementia in other diseases classified elsewhere without behavioral 
disturbance     F02.80     Active  348091835

 

 Problem  Ataxia     R27.0     Active  87514216

 

 Problem  Hyperlipidemia     E78.5     Active  86646451

 

 Problem  Delusional disorder     F22     Active  15605588

 

 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 
condition     F28     Active  22861601

 

 Problem  Psychosis, unspecified psychosis type     F29     Active  35403646

 

 Problem  Alzheimers disease with late onset     G30.1     Active  59906271

 

 Problem  Unspecified dementia without behavioral disturbance     F03.90     
Active  96873937

 

 Problem  Hallucinations     R44.3     Active  2981896







ALLERGIES

No Information



ENCOUNTERS







 Encounter  Location  Date  Diagnosis

 

 [facility]  [address], [place] [address]-
6847     

 

 [facility]  [address], [place] [address]
5733     

 

 [facility]  [id number] [place] [address]686-
9549  13 2018  Delusional disorder F22 and Psychosis, unspecified 
psychosis type F29

 

 [facility]  [address] [place] [address]-
5819     

 

 [facility]  [id number] [place] [address]757-
5391     

 

 [facility]  [address] [place] [address]25-
7766  22 May, 2018  Local infection of the skin and subcutaneous tissue, 
unspecified L08.9 and Other injury of unspecified body region, initial 
encounter T14.8XXA

 

 [place] [id number] [place] [address] 90328-
4078  17 May, 2018   

 

 [facility]  [id number] [place] [address] 33920-
4059  10 May, 2018   

 

 [place] [id number] [place] [address] 09358-
7958    Hallucinations R44.3

 

 [place] [id number] [place] [address] 89800-
3600  19 Mar, 2018  Psychosis, unspecified psychosis type F29

 

 [place] [id number] [place] [address] 16915-
8674  15 Mar, 2018   

 

 [place] [id number] [place] [address] 63445-
1788  13 Mar, 2018   

 

 [place] [id number] [place] [address] 80834-
9365    Unspecified dementia without behavioral disturbance F03.90 
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 
condition F28

 

 [place] [id number] [place] [address] 60913-
6630     

 

 [place] [id number] [place] [address] 15400-
3142     

 

 [place] [id number] [place] [address] 46470-
1376     

 

 [place] [id number] [place] [address] 26140-
5076    Dementia, unspecified, without behavioral disturbance F03.90

 

 [place] [id number] [place] [address] 31484-
2442  06 Dec, 2017  Medicare annual wellness visit, initial Z00.00 and 
Encounter for immunization Z23

 

 [place] [id number] [place] [address] 22419-
7941    Ataxia R27.0 and Hallucinations R44.3

 

 [place] [id number] [place] [address] 39230-
7654     

 

 [facility]  [address] [address]-
1346  12 Oct, 2017  Encounter for immunization Z23

 

 [place] [address] [address]-
2590  11 Sep, 2017  Hallucinations R44.3

 

 [place] [address] [address]-
8555  05 Sep, 2017  Hallucinations R44.3

 

 [place] [address] [address]-
9147  30 Aug, 2017  Arthralgia of left knee M25.562 ; Age-related nuclear 
cataract of both eyes H25.13 and Hallucinations R44.3

 

 [place] [address] [address]-
3152  24 Aug, 2017   

 

 [place] [address] [address]-
0128  14 Aug, 2017   

 

 [place] [address] [address]14-
3069    Hyperlipidemia E78.5 and Hypertension I10

 

 [place] [id number] [place] [address] 55623-
2692    Hyperlipidemia E78.5 and Hypertension I10

 

 [place] [id number] [place] [address] 03656-
8388     

 

 [place] [address] [address]-
9231  15 Feb, 2017  Hypertension I10 ; Alzheimers disease with late onset G30.1 
; Dementia in other diseases classified elsewhere without behavioral 
disturbance F02.80 and Ataxia R27.0

 

 [place] [address] [address]-
6387     

 

 [facility]  [id number] [place] [address] 96537-
1645  08 2016   

 

 [facility]  [id number] [place] [address] 47715-
3843  07 2016  Hypertension I10 and Ataxia R27.0

 

 [facility]  [id number] [place] [address] 98005-
7512  14 Oct, 2016   

 

 [facility]  [id number] [place] [address] 10737-
4968  26 Sep, 2016   

 

 [facility]  [id number] [place] [address] 33812-
2986  23 Sep, 2016   

 

 [facility]  [id number] [place] [address] 11287-
5968  23 Sep, 2016   

 

 [facility]  [id number] [place] [address] 84705-
1028  20 Sep, 2016   

 

 [facility]  [id number] [place] [address] 13277-
5648  16 Sep, 2016   

 

 [facility]  [id number] [place] [address] [phone]
-[phone]  13 Aug, 2016  Urinary frequency R35.0 and Acute cystitis without 
hematuria N30.00

 

 [facility]  [id number] [place] [address] 98400-
8209     

 

 [facility]  [id number] [place] [address] 81188-
7627     

 

 [facility]  [id number] [place] [address] 10999-
6517  11 Mar, 2016  Hyperlipidemia E78.5

 

 [facility]  [id number] [place] [address] 65674-
1376  10 Mar, 2016  Ataxia R27.0 ; Hyperlipidemia E78.5 and Hypertension I10

 

 [facility]  [id number] [place] [address] 93402-
0606     

 

 [address] [place] [address] 86714-
1768    Ataxia R27.0 ; Senile cataracts of both eyes H25.9 and 
Constipation, unspecified constipation type K59.00

 

 [address] [place] [address] 86715-
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

 

 [address] [address]-
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

 

 [address] [address]-
9594  21 Aug, 2015  Dementia 294.20

 

 [address] [place] [address] 74440-
4203  13 Aug, 2015  Establishing care with new doctor, encounter for V65.8 ; 
Ataxia 781.3 ; Horizontal nystagmus 379.56 ; Hypertension 401.9 ; 
Hyperlipidemia 272.4 and History of TIA (transient ischemic attack) V12.54

 

 [address] [place] [address] 43395-
6824  06 Aug, 2015   

 

 [id number] [place] [address] 15535-
8355  05 Aug, 2015   







IMMUNIZATIONS

No Known Immunizations



SOCIAL HISTORY

Never Assessed



REASON FOR VISIT

Neuropsych Testing



PLAN OF CARE







 Activity  Details









  









 Follow Up  Next available Reason:







VITAL SIGNS





MEDICATIONS







 Medication  Instructions  Dosage  Frequency  Start Date  End Date  Duration  
Status

 

 Celexa 20 MG  Orally Once a day  1 tablet  24h        90 days  Unknown

 

 Fish Oil 1000 MG  Orally Twice a day  1 capsule  12h           Unknown

 

 Aspirin Low Dose 81 MG  Orally Once a day  1 tablet  24h           Unknown







RESULTS

No Results



PROCEDURES







 Procedure  Date Ordered  Result  Body Site

 

 [facility] VISIT ESTABLISHED PATIENT  2018      

 

 NEUROPSYCH TST BY PSYCH/PHYS  2018      







INSTRUCTIONS





MEDICATIONS ADMINISTERED

No Known Medications



MEDICAL (GENERAL) HISTORY







 Type  Description  Date

 

 Medical History  coronary artery disease   

 

 Medical History  Carotid stenosis   

 

 Medical History  hypertension   

 

 Medical History  hyperlipidemia   

 

 Medical History  mitral valve regurgitation   

 

 Medical History  vitamin D deficiency   

 

 Medical History  nystagmus   

 

 Medical History  chest pain syndrome/palpitations   

 

 Medical History  ataxia/unsteady gait   

 

 Medical History  TIAs   

 

 Medical History  stress test 2012 EF 81%; 2014 EF 78%   

 

 Medical History  echo 2012 EF 60% mild MR,TR, AV stenosis; 2014 EF 60% 
aortic regurgitatio, MR, TR   

 

 Medical History  carotid duplex 2012 <40% Bilat; 2014 <40% bilat   

 

 Medical History  cataracts   

 

 Surgical History  left cataract surgery  2018

 

 Hospitalization History  falls   

 

 Hospitalization History  left hip pain, age-indeterminate pubic rami fracture--
[facility]  16

## 2018-08-09 NOTE — XMS REPORT
[facility]

 Created on: 10/17/2016



Kely Landis

External Reference #: [id number]

: 1935

Sex: Female



Demographics







 Address  [address] [place]  [address]

 

 Home Phone  [phone]

 

 Preferred Language  Unknown

 

 Marital Status  Unknown

 

 Adventism Affiliation  Unknown

 

 Race  White

 

 Ethnic Group  Not  or 





Author







 Author  ODIN GAUTHIER

 

 Organization  [employer]

 

 Address  Unknown

 

 Phone  Unavailable







Care Team Providers







 Care Team Member Name  Role  Phone

 

 ODIN GAUTHIER  CP  Unavailable



                                                                



Allergies

          No Known Allergies                                                   
                                     



Problems

          





 Problem Type  Condition  Code  Onset Dates  Condition Status

 

 Problem  Ataxia  R27.0     Active

 

 Problem  Hypertension  I10     Active

 

 Problem  Hyperlipidemia  E78.5     Active

 

 Problem  Hypertension  401.9     Active

 

 Problem  Hyperlipidemia  272.4     Active

 

 Problem  Unspecified psychosis  298.9     Active

 

 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



                                                                               
                                                                     



Medications

          





 Medication  Code System  Code  Instructions  Start Date  End Date  Status  
Dosage

 

 Protonix  NDC  [address]-51  40 mg Orally Once a day  Oct 14, 2016        1 
tablet

 

 Carafate  NDC  [address]-92  1 GM Orally before meals and bedtime  Oct 14, 
2016        1 tablet on an empty stomach



                                                                               
         



Results

          No Known Results                                                     
               



Summary Purpose

          [employer] Submission

## 2018-08-09 NOTE — XMS REPORT
[facility]

 Created on: 2017



Kely Landis

External Reference #: [id number]

: 1935

Sex: Female



Demographics







 Address  [address] 

[place]  [address]

 

 Preferred Language  Unknown

 

 Marital Status  Unknown

 

 Buddhist Affiliation  Unknown

 

 Race  Unknown

 

 Ethnic Group  Unknown





Author







 Author  ODIN GAUTHIER

 

 [facility]

 

 Address  [address] [place]  [address]



 

 Phone  [phone]







Care Team Providers







 Care Team Member Name  Role  Phone

 

 ODIN GAUTHIER  Unavailable  [phone]







PROBLEMS







 Type  Condition  ICD9-CM Code  FFN61-OX Code  Onset Dates  Condition Status  
SNOMED Code

 

 Problem  Ataxia     R27.0     Active  30735491

 

 Problem  Hallucinations     R44.3     Active  7241647

 

 Problem  Psychosis, unspecified psychosis type     F29     Active  97732327

 

 Problem  Hypertension     I10     Active  61511127

 

 Problem  Hyperlipidemia     E78.5     Active  38926534

 

 Problem  Alzheimers disease with late onset     G30.1     Active  66821655

 

 Problem  Dementia in other diseases classified elsewhere without behavioral 
disturbance     F02.80     Active  422494211







ALLERGIES

No Known Allergies



SOCIAL HISTORY

Never Assessed



PLAN OF CARE







 Activity  Details









  









 Follow Up  3 Months Reason:BP







VITAL SIGNS







 Height  64 in  2017-02-15

 

 Weight  122.5 lbs  2017-02-15

 

 Temperature  97.7 degrees Fahrenheit  2017-02-15

 

 Heart Rate  58 bpm  2017-02-15

 

 Respiratory Rate  18   2017-02-15

 

 BMI  21.02 kg/m2  2017-02-15

 

 Blood pressure systolic  100 mmHg  2017-02-15

 

 Blood pressure diastolic  68 mmHg  2017-02-15







MEDICATIONS







 Medication  Instructions  Dosage  Frequency  Start Date  End Date  Duration  
Status

 

 Protonix 40 mg  Orally Once a day  1 tablet  24h        30  Active

 

 Aspirin Low Dose 81 MG  Orally Once a day  1 tablet  24h           Active

 

 Fish Oil 1000 MG  Orally Twice a day  1 capsule  12h           Active

 

 Celexa 20 mg  Orally (Home Delivery) Once a day  1 tablet  24h        90  
Active

 

 Simvastatin 20 mg  Orally  (Home Delivery) Once a day  1 tablet in the evening
  24h        90  Active







RESULTS

No Results



PROCEDURES







 Procedure  Date Ordered  Result  Body Site

 

 [facility] VISIT ESTABLISHED PATIENT  Feb 15, 2017      







IMMUNIZATIONS

No Known Immunizations



MEDICAL (GENERAL) HISTORY







 Type  Description  Date

 

 Medical History  coronary artery disease   

 

 Medical History  Carotid stenosis   

 

 Medical History  hypertension   

 

 Medical History  hyperlipidemia   

 

 Medical History  mitral valve regurgitation   

 

 Medical History  vitamin D deficiency   

 

 Medical History  nystagmus   

 

 Medical History  chest pain syndrome/palpitations   

 

 Medical History  ataxia/unsteady gait   

 

 Medical History  TIAs   

 

 Medical History  stress test 2012 EF 81%; 2014 EF 78%   

 

 Medical History  echo 2012 EF 60% mild MR,TR, AV stenosis; 2014 EF 60% 
aortic regurgitatio, MR, TR   

 

 Medical History  carotid duplex 2012 <40% Bilat; 2014 <40% bilat   

 

 Medical History  cataracts   

 

 Hospitalization History  falls   

 

 Hospitalization History  left hip pain, age-indeterminate pubic rami fracture--
[facility]  16

## 2018-08-09 NOTE — XMS REPORT
[facility]

 Created on: 2018



Kely Landis

External Reference #: [id number]

: 1935

Sex: Female



Demographics







 Address  [address] [place]  [address]

 

 Preferred Language  Unknown

 

 Marital Status  Unknown

 

 Restoration Affiliation  Unknown

 

 Race  Unknown

 

 Ethnic Group  Unknown





Author







 Author  ALIZA RODRIGUEZ

 

 [facility]

 

 Address  [address] [place]  [address]



 

 Phone  [phone]







Care Team Providers







 Care Team Member Name  Role  Phone

 

 ALIZA RODRIGUEZ  Unavailable  [phone]







PROBLEMS







 Type  Condition  ICD9-CM Code  SVV69-IX Code  Onset Dates  Condition Status  
SNOMED Code

 

 Problem  Hypertension     I10     Active  07849190

 

 Problem  Dementia in other diseases classified elsewhere without behavioral 
disturbance     F02.80     Active  209230335

 

 Problem  Ataxia     R27.0     Active  36403081

 

 Problem  Hyperlipidemia     E78.5     Active  28161540

 

 Problem  Delusional disorder     F22     Active  00806693

 

 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 
condition     F28     Active  53473195

 

 Problem  Psychosis, unspecified psychosis type     F29     Active  08511718

 

 Problem  Alzheimers disease with late onset     G30.1     Active  14508860

 

 Problem  Unspecified dementia without behavioral disturbance     F03.90     
Active  21012733

 

 Problem  Hallucinations     R44.3     Active  9723263







ALLERGIES

No Information



ENCOUNTERS







 Encounter  Location  Date  Diagnosis

 

 [place] [id number] [place] [address] 44279-
5236  08 Aug, 2018   

 

 [facility]  [id number] [place] [address] 42234-
3896    Hallucinations R44.3 ; Alzheimers disease with late onset 
G30.1 and Toe pain, right M79.674

 

 [facility]  [id number] [place] [address] 74669-
3297     

 

 [facility]  [address] [phone]-
3131    Hallucinations R44.3

 

 [facility]  [id number] [place] [address] 57024-
8860     

 

 [facility]  [address] [address]94-
[phone]    Delusional disorder F22 and Psychosis, unspecified 
psychosis type F29

 

 [facility]  [address], [place] [address]-
9552     

 

 [facility]  [id number] [place] [address] 91293-
8289     

 

 [facility]  [id number] [place] [address] 96309-
3568  22 May, 2018  Local infection of the skin and subcutaneous tissue, 
unspecified L08.9 and Other injury of unspecified body region, initial 
encounter T14.8XXA

 

 [facility]  [id number] [place] [address] 85443-
6150  17 May, 2018   

 

 [facility]  [id number] [place] [address] 78078-
7701  10 May, 2018   

 

 [facility]  [id number] [place] [address] 65018-
2092    Hallucinations R44.3

 

 [facility]  [id number] [place] [address] 88503-
5271  19 Mar, 2018  Psychosis, unspecified psychosis type F29

 

 [facility]  [id number] [place] [address] 01287-
[phone]  15 Mar, 2018   

 

 [facility]  [id number] [place] [address] 20851-
8077  13 Mar, 2018   

 

 [facility]  [id number] [place] [address] 12253-
5487    Unspecified dementia without behavioral disturbance F03.90 
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 
condition F28

 

 [facility]  [id number] [place] [address] 42321-
4456     

 

 [facility]  [address] [address]-
9931     

 

 [facility]  [id number] [place] [address] 26392-
7816     

 

 [facility]  [address] [place] [address] 88152-
1011    Dementia, unspecified, without behavioral disturbance F03.90

 

 [place] [id number] [place] [address] 06877-
[phone]  06 Dec, 2017  Medicare annual wellness visit, initial Z00.00 and 
Encounter for immunization Z23

 

 [place] [id number] [place] [address] 36528-
5065    Ataxia R27.0 and Hallucinations R44.3

 

 [place] [id number] [place] [address] 92206-
8041     

 

 [place] [id number] [place] [address] 52398-
[phone]  12 Oct, 2017  Encounter for immunization Z23

 

 [place] [id number] [place] [address] 97467-
5030  11 Sep, 2017  Hallucinations R44.3

 

 [place] [id number] [place] [address] 63622-
3373  05 Sep, 2017  Hallucinations R44.3

 

 [place] [id number] [place] [address] 11477-
8418  30 Aug, 2017  Arthralgia of left knee M25.562 ; Age-related nuclear 
cataract of both eyes H25.13 and Hallucinations R44.3

 

 [place] [id number] [place] [address] 41167-
2191  24 Aug, 2017   

 

 [place] [id number] [place] [address] 26659-
7422  14 Aug, 2017   

 

 [place] [id number] [place] [address] 37766-
3814    Hyperlipidemia E78.5 and Hypertension I10

 

 [place] [id number] [place] [address] 45641-
8654    Hyperlipidemia E78.5 and Hypertension I10

 

 [place] [id number] [place] [address] 86936-
[phone]     

 

 [facility]  [id number] [place] [address] 53881-
0247  15 Feb, 2017  Hypertension I10 ; Alzheimers disease with late onset G30.1 
; Dementia in other diseases classified elsewhere without behavioral 
disturbance F02.80 and Ataxia R27.0

 

 [facility]  [id number] [place] [address] 80225-
9103  14 2016   

 

 [facility]  [id number] [place] [address] 69969-
[phone]     

 

 [facility]  [id number] [place] [address] 22468-
2258    Hypertension I10 and Ataxia R27.0

 

 [facility]  [id number] [place] [address] 50542-
9895  14 Oct, 2016   

 

 [facility]  [id number] [place] [address] 25756-
2133  26 Sep, 2016   

 

 [facility]  [id number] [place] [address] 38863-
8079  23 Sep, 2016   

 

 [facility]  [id number] [place] [address] 16246-
9551  23 Sep, 2016   

 

 [facility]  [id number] [place] [address] 90685-
[phone]  20 Sep, 2016   

 

 [facility]  [id number] [place] [address] 04301-
1503  16 Sep, 2016   

 

 [facility]  [id number] [place] [address] [phone]
[phone]  13 Aug, 2016  Urinary frequency R35.0 and Acute cystitis without 
hematuria N30.00

 

 [facility]  [id number] [place] [address] 80701-
9341     

 

 [facility]  [id number] [place] [address] 84809-
0741     

 

 [place] [id number] [place] [address] 36345-
1734  11 Mar, 2016  Hyperlipidemia E78.5

 

 [address] [address]-
5565  10 Mar, 2016  Ataxia R27.0 ; Hyperlipidemia E78.5 and Hypertension I10

 

 [address] [address]-
6867     

 

 [address] [address]-
3189    Ataxia R27.0 ; Senile cataracts of both eyes H25.9 and 
Constipation, unspecified constipation type K59.00

 

 [address] [address]-
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

 

 [address] [address]-
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

 

 [place] [id number] [place] [address] 62513-
6029  21 Aug, 2015  Dementia 294.20

 

 [place] [id number] [place] [address] 52405-
7615  13 Aug, 2015  Establishing care with new doctor, encounter for V65.8 ; 
Ataxia 781.3 ; Horizontal nystagmus 379.56 ; Hypertension 401.9 ; 
Hyperlipidemia 272.4 and History of TIA (transient ischemic attack) V12.54

 

 [place] [id number] [place] [address] 01089-
9473  06 Aug, 2015   

 

 [address] [address]-
6325  05 Aug, 2015   







IMMUNIZATIONS

No Known Immunizations



SOCIAL HISTORY

Never Assessed



REASON FOR VISIT

Requests return call



PLAN OF CARE





VITAL SIGNS





MEDICATIONS

Unknown Medications



RESULTS

No Results



PROCEDURES

No Known procedures



INSTRUCTIONS





MEDICATIONS ADMINISTERED

No Known Medications



MEDICAL (GENERAL) HISTORY







 Type  Description  Date

 

 Medical History  coronary artery disease   

 

 Medical History  Carotid stenosis   

 

 Medical History  hypertension   

 

 Medical History  hyperlipidemia   

 

 Medical History  mitral valve regurgitation   

 

 Medical History  vitamin D deficiency   

 

 Medical History  nystagmus   

 

 Medical History  chest pain syndrome/palpitations   

 

 Medical History  ataxia/unsteady gait   

 

 Medical History  TIAs   

 

 Medical History  stress test 2012 EF 81%; 2014 EF 78%   

 

 Medical History  echo 2012 EF 60% mild MR,TR, AV stenosis; 2014 EF 60% 
aortic regurgitatio, MR, TR   

 

 Medical History  carotid duplex 2012 <40% Bilat; 2014 <40% bilat   

 

 Medical History  cataracts   

 

 Surgical History  left cataract surgery  2018

 

 Hospitalization History  falls   

 

 Hospitalization History  left hip pain, age-indeterminate pubic rami fracture--
[facility]  16

## 2018-08-09 NOTE — XMS REPORT
[facility]

 Created on: 2016



Boby Kely

External Reference #: [id number]

: 1935

Sex: Female



Demographics







 Address  [facility] [address] [place]  [address]

 

 Home Phone  [phone]

 

 Preferred Language  Unknown

 

 Marital Status  Unknown

 

 Yazdanism Affiliation  Unknown

 

 Race  White

 

 Ethnic Group  Not  or 





Author







 Author  ODIN GAUTHIER

 

 Organization  [employer]

 

 Address  Unknown

 

 Phone  Unavailable







Care Team Providers







 Care Team Member Name  Role  Phone

 

 ODIN GAUTHIER  CP  Unavailable



                                                                



Allergies

          No Known Allergies                                                   
                                     



Problems

          





 Problem Type  Condition  Code  Onset Dates  Condition Status

 

 Problem  Ataxia  R27.0     Active

 

 Problem  Hypertension  I10     Active

 

 Problem  Hyperlipidemia  E78.5     Active

 

 Problem  Hypertension  401.9     Active

 

 Problem  Hyperlipidemia  272.4     Active

 

 Problem  Unspecified psychosis  298.9     Active

 

 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



                                                                               
                                                                     



Medications

          No Known Medications                                                 
                             



Results

          No Known Results                                                     
               



Summary Purpose

          [employer] Submission

## 2018-08-09 NOTE — XMS REPORT
[facility]

 Created on: 10/20/2015



Kely Kulkarni

External Reference #: [id number]

: 1935

Sex: Female



Demographics







 Address  [address] [place] [address] [place]  [address]

 

 Home Phone  [phone]

 

 Preferred Language  Unknown

 

 Marital Status  Unknown

 

 Mandaeism Affiliation  Unknown

 

 Race  Unreported/Refused to Report

 

 Ethnic Group  Not  or 





Author







 Author  ODIN GAUTHIER

 

 Organization  [employer]

 

 Address  Unknown

 

 Phone  Unavailable







Care Team Providers







 Care Team Member Name  Role  Phone

 

 OIDN GAUTHIER  CP  Unavailable



                                                                



Allergies

          No Known Allergies                                                   
                                     



Problems

          





 Problem Type  Condition  Code  Onset Dates  Condition Status

 

 Problem  Hyperlipidemia  272.4     Active

 

 Assessment  Dementia  294.20     Active

 

 Problem  Hypertension  401.9     Active



                                                                               
                             



Medications

          No Known Medications                                                 
                             



Results

          No Known Results                                                     
               



Summary Purpose

          [employer] Submission

## 2018-08-09 NOTE — XMS REPORT
[facility]

 Created on: 2015



Kely Kulkarni

External Reference #: [id number]

: 1935

Sex: Female



Demographics







 Address  [address] [place] [address] [place]  [address]

 

 Home Phone  [phone]

 

 Preferred Language  Unknown

 

 Marital Status  Unknown

 

 Hindu Affiliation  Unknown

 

 Race  Unreported/Refused to Report

 

 Ethnic Group  Not  or 





Author







 Author  ODIN GAUTHIER

 

 [facility]  [employer]

 

 Address  Unknown

 

 Phone  Unavailable







Care Team Providers







 Care Team Member Name  Role  Phone

 

 ODIN GAUTHIER  CP  Unavailable



                                                                



Allergies, Adverse Reactions, Alerts

          





 Substance  Reaction  Event Type

 

 N.K.D.A.  Info Not Available  Non Drug Allergy



                                                                               
         



Problems

          





 Problem Type  Condition  Code  Onset Dates  Condition Status

 

 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

 

 Problem  Hypertension  401.9     Active

 

 Problem  Unspecified psychosis  298.9     Active

 

 Assessment  Senile cataracts of both eyes  H25.9     Active

 

 Assessment  Constipation, unspecified constipation type  K59.00     Active

 

 Problem  Hyperlipidemia  272.4     Active

 

 Assessment  Ataxia  R27.0     Active



                                                                               
                                                                     



Medications

          





 Medication  Code System  Code  Instructions  Start Date  End Date  Status  
Dosage

 

 Aspirin Low Dose  NDC  [address]-46  81 MG Orally Once a day           1 
tablet

 

 Fish Oil  NDC  [address]-65  1000 MG Orally Twice a day           1 capsule

 

 Coenzyme Q10  NDC  [address]-78  10 MG Orally Once a day           1 capsule 
with a meal

 

 Magnesium Oxide  NDC  [address]-68  400 MG Orally Once a day           not 
defined

 

 Colace  NDC  [address]-14  100 MG Orally Once a day may repeat if needed  2015        1 capsule as needed

 

 Simvastatin  NDC  [address]-10  20 MG Orally Once a day           1 tablet in 
the evening

 

 Celexa  NDC  [address]-03  20 MG Orally Once a day           1 tablet

 

 Lisinopril  NDC  [address]-64  40 MG Orally Once a day           1 tablet

 

 Tylenol  NDC  [address]-39  325 MG Orally 2 times a day           1 tablet as 
needed

 

 Advil  NDC  [address]-96  200 MG Orally every 6 hrs           1 tablet as 
needed

 

 Bystolic  NDC  [address]-61  5 MG Orally Once a day           1 tablet



                                                                               
                                                                               
                              



Procedures

          





 Procedure  Coding System  Code  Date

 

 Office Visit, Est Pt., Level 3  CPT-4  15331  2015

 

 [facility] VISIT ESTABLISHED PATIENT  CPT-4    2015



                                                                               
                             



Vital Signs

          





 Date/Time:  2015

 

 Temperature  98.0 F

 

 Weight  118 lbs

 

 Height  64 in

 

 BMI  20.25 Index

 

 Blood Pressure Diastolic  70 mmHg

 

 Blood Pressure Systolic  128 mmHg

 

 Cardiac Monitoring Heart Rate  68 bpm



                                                                              



Results

          No Known Results                                                     
               



Summary Purpose

          [employer] Submission

## 2018-08-09 NOTE — XMS REPORT
[facility]

 Created on: 2018



Boby Kely

External Reference #: [id number]

: 1935

Sex: Female



Demographics







 Address  [address] [place]  [address]

 

 Preferred Language  Unknown

 

 Marital Status  Unknown

 

 Adventist Affiliation  Unknown

 

 Race  Unknown

 

 Ethnic Group  Unknown





Author







 Author  ODIN GAUTHIER

 

 [facility]

 

 Address  [address] [place]  [address]



 

 Phone  [phone]







Care Team Providers







 Care Team Member Name  Role  Phone

 

 ODIN GAUTHIER  Unavailable  [phone]







PROBLEMS







 Type  Condition  ICD9-CM Code  VDI59-HN Code  Onset Dates  Condition Status  
SNOMED Code

 

 Problem  Ataxia     R27.0     Active  32386078

 

 Problem  Hypertension     I10     Active  86059512

 

 Problem  Hyperlipidemia     E78.5     Active  63712685

 

 Problem  Unspecified dementia without behavioral disturbance     F03.90     
Active  82398664

 

 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 
condition     F28     Active  45462812

 

 Problem  Alzheimers disease with late onset     G30.1     Active  83672842

 

 Problem  Dementia in other diseases classified elsewhere without behavioral 
disturbance     F02.80     Active  294032685

 

 Problem  Hallucinations     R44.3     Active  4988792

 

 Problem  Psychosis, unspecified psychosis type     F29     Active  22982155







ALLERGIES

No Information



ENCOUNTERS







 Encounter  Location  Date  Diagnosis

 

 [place] [address] [address]-
[phone]     

 

 [facility]  [id number] [place] [phone]-
[phone]  19 Mar, 2018  Psychosis, unspecified psychosis type F29

 

 [facility]  [id number] [place] [phone]-
3201  15 Mar, 2018   

 

 [facility]  [id number] [place] [phone]-
5611  13 Mar, 2018   

 

 [facility]  [address] [address]-
[address]708    Unspecified dementia without behavioral disturbance F03.90 
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 
condition F28

 

 [facility]  [address] [address]-
9920     

 

 [facility]  [id number] [place] [address]-
2652     

 

 [facility]  [address] [address]-
1422     

 

 [facility]  [address] [address]-
3230    Dementia, unspecified, without behavioral disturbance F03.90

 

 [place] [address] [address]-
1481  06 Dec, 2017  Medicare annual wellness visit, initial Z00.00 and 
Encounter for immunization Z23

 

 [place] [address] [address]-
3269    Ataxia R27.0 and Hallucinations R44.3

 

 [place] [address] [address]-
4070     

 

 [place] [address] [address]-
4594  12 Oct, 2017  Encounter for immunization Z23

 

 [place] [address] [address]-
0939  11 Sep, 2017  Hallucinations R44.3

 

 [place] [address] [address]-
8680  05 Sep, 2017  Hallucinations R44.3

 

 [place] [address] [address]-
0127  30 Aug, 2017  Arthralgia of left knee M25.562 ; Age-related nuclear 
cataract of both eyes H25.13 and Hallucinations R44.3

 

 [place] [address] [address]-
1522  24 Aug, 2017   

 

 [place] [address] [address]-
0439  14 Aug, 2017   

 

 [place] [address] [address]-
6009    Hyperlipidemia E78.5 and Hypertension I10

 

 [facility]  [id number] [place] [address]-
2306    Hyperlipidemia E78.5 and Hypertension I10

 

 [facility]  [id number] [place] [address]-
8436     

 

 [facility]  [id number] [place] [address]-
3212  15 2017  Hypertension I10 ; Alzheimers disease with late onset G30.1 
; Dementia in other diseases classified elsewhere without behavioral 
disturbance F02.80 and Ataxia R27.0

 

 [facility]  [id number] [place] [address]-
4650  14 2016   

 

 [facility]  [id number] [place] [address]-
3554  08 2016   

 

 [facility]  [id number] [place] [address]-
0391    Hypertension I10 and Ataxia R27.0

 

 [facility]  [id number] [place] [address]-
5602  14 Oct, 2016   

 

 [facility]  [id number] [place] [address] 46769-
2793  26 Sep, 2016   

 

 [facility]  [id number] [place] [address]61-
8277  23 Sep, 2016   

 

 [facility]  [id number] [place] [address]-
2357  23 Sep, 2016   

 

 [facility]  [id number] [place] [address] 7[address]96-
9652  20 Sep, 2016   

 

 [facility]  [id number] [place] [address]70-
7031  16 Sep, 2016   

 

 [facility]  [address] [place] [phone]
-[phone]  13 Aug, 2016  Urinary frequency R35.0 and Acute cystitis without 
hematuria N30.00

 

 [place] [address] [address]-
[address]     

 

 [place] [address] [place] [id number] [place] [address]-
8290     

 

 [place] [address] [place] [id number] [place] [address]61-
3814  11 Mar, 2016  Hyperlipidemia E78.5

 

 [place] [id number] [place] [address] 6[address]52-
7271  10 Mar, 2016  Ataxia R27.0 ; Hyperlipidemia E78.5 and Hypertension I10

 

 [place] [id number] [place] [address]-
8597     

 

 [place] [address] [place] [id number] [place] [address] 26980-
2171    Ataxia R27.0 ; Senile cataracts of both eyes H25.9 and 
Constipation, unspecified constipation type K59.00

 

 [place] [address] [place] [id number] [place] [address]-
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

 

 [place] [id number] [place] [address]-
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

 

 [place] [address] [place] [id number] [place] [address] 00832-
6007  21 Aug, 2015  Dementia 294.20

 

 [place] [address] [place] [id number] [place] [address]76-
0950  13 Aug, 2015  Establishing care with new doctor, encounter for V65.8 ; 
Ataxia 781.3 ; Horizontal nystagmus 379.56 ; Hypertension 401.9 ; 
Hyperlipidemia 272.4 and History of TIA (transient ischemic attack) V12.54

 

 [place] [address] [place] [id number] [place] [address]-
8407  06 Aug, 2015   

 

 [place] [address] [place] [id number] [place] [address]63-
7744  05 Aug, 2015   







IMMUNIZATIONS

No Known Immunizations



SOCIAL HISTORY

Never Assessed



REASON FOR VISIT

Requests return call



PLAN OF CARE





VITAL SIGNS





MEDICATIONS

Unknown Medications



RESULTS

No Results



PROCEDURES

No Known procedures



INSTRUCTIONS





MEDICATIONS ADMINISTERED

No Known Medications



MEDICAL (GENERAL) HISTORY







 Type  Description  Date

 

 Medical History  coronary artery disease   

 

 Medical History  Carotid stenosis   

 

 Medical History  hypertension   

 

 Medical History  hyperlipidemia   

 

 Medical History  mitral valve regurgitation   

 

 Medical History  vitamin D deficiency   

 

 Medical History  nystagmus   

 

 Medical History  chest pain syndrome/palpitations   

 

 Medical History  ataxia/unsteady gait   

 

 Medical History  TIAs   

 

 Medical History  stress test 2012 EF 81%; 2014 EF 78%   

 

 Medical History  echo 2012 EF 60% mild MR,TR, AV stenosis; 2014 EF 60% 
aortic regurgitatio, MR, TR   

 

 Medical History  carotid duplex 2012 <40% Bilat; 2014 <40% bilat   

 

 Medical History  cataracts   

 

 Surgical History  left cataract surgery  2018

 

 Hospitalization History  falls   

 

 Hospitalization History  left hip pain, age-indeterminate pubic rami fracture--
[facility]  16

## 2018-12-12 NOTE — DIAGNOSTIC IMAGING REPORT
PROCEDURE: CT head and CT cervical spine without contrast.



TECHNIQUE: Multiple contiguous axial images were obtained through

the brain and cervical spine without the use of intravenous

contrast. Sagittal and coronal reformations through the cervical

spine were then performed.



INDICATION: Fall, injury to left side of the head.



COMPARISON: 06/28/2018.



FINDINGS:



CT head: The ventricles and cortical sulci are mildly prominent,

consistent with generalized parenchymal volume loss. There are

focal areas of hypoattenuation in the periventricular and

subcortical white matter, consistent with chronic microvascular

disease. No acute intracranial hemorrhage is seen. There is no CT

evidence of acute territorial ischemia. There is no midline shift

or mass effect. The calvarium is intact.



CT cervical spine: There is grade 1 anterolisthesis at C3-C4,

C4-C5. There is mild reversal of the cervical lordosis at C5.

Moderate degenerative changes are seen at C5-C6 and C6-C7. There

is facet arthropathy bilaterally, left greater than right.

Findings appear similar to June 2018. Vertebral body heights are

preserved. No acute fracture is seen. There is calcific

atherosclerosis. Prevertebral soft tissues are unremarkable.



IMPRESSION:

1. No acute intracranial hemorrhage or CT evidence of acute

territorial ischemia. No calvarium fracture seen.

2. Degenerative changes in the cervical spine appear similar to

June 2018. No acute fracture is seen.



Dictated by: 



  Dictated on workstation # [id number]

## 2018-12-12 NOTE — HISTORY AND PHYSICAL
DATE OF SERVICE:  12/12/2018



ATTENDING PRIMARY CARE PHYSICIAN:

[facility].



HISTORY:

The patient is an 83-year-old female, who lives at home with her son and

daughter-in-law.  She states that she does ambulate with a walker at home and

does majority of home activities independently.  She reports that she has had

some issues with loss of balance and falling.  Today, she did fall on her right

side on to some form of concrete slab.  Afterwards that she did have pain in the

right lateral and anterior rib cage as well as mild shortness of breath

secondary to the pain.  She denies any loss of consciousness.  There are no open

wounds or any lacerations.  She does not report any neck pain, loss of

consciousness as well as no headache or visual changes.  Her [place] coma scale

is 15.  An x-ray of the left foot was performed due to foot pain, which did show

a fracture of the fifth metatarsal bone, mildly displaced.  CT scan of the

chest, abdomen and pelvis showed nondisplaced fractures of right lateral third

through seventh ribs.  No other abnormalities detected.  There was

cholelithiasis found as an incidental finding.  Upon examination today, she

appears well and her [place] coma scale is 15.  She does have pain in the right

lateral and anterior rib; however, this is tolerable with pain medication.  She

does not have any distracting injuries.



PAST MEDICAL HISTORY:

Hypercholesterolemia, hypertension, chronic urinary tract infection,

hyperthryroid, gastroesophageal reflux disease.



PAST SURGICAL HISTORY:

None.



ALLERGIES:

No known drug allergies.



MEDICATIONS:

1.  Aspirin 81 mg daily.

2.  Citalopram 20 mg daily.

3.  Lisinopril 20 mg daily.

4.  Nebivolol 5 mg daily.

5.  Protonix 40 mg daily.

6.  Carafate 1 gram q.i.d.

7.  Simvastatin 20 mg daily.

8.  Tramadol 50 mg t.i.d. p.r.n.



SOCIAL HISTORY:

Negative smoke, negative alcohol.



FAMILY HISTORY:

Noncontributory.



REVIEW OF SYSTEMS:

This is a well-nourished female in no acute distress.  She is not experiencing

any shortness of breath or difficulty breathing.  No chest pain, palpitations,

or diaphoresis.  No nausea or vomiting.  No diarrhea or constipation.  No red

blood per rectum.  No dark tarry stools.  No fever or chills.  No recent

inadvertent weight loss.  All other review of systems are negative.



PHYSICAL EXAMINATION:

VITAL SIGNS:  Temperature 99.5, blood pressure 177/75, pulse 78, respirations

20, pulse ox 99% on room air.

CHEST:  A few scattered rales and rhonchi bilaterally.  There is right rib pain

upon gentle palpation.  No flail segment or subcutaneous air.  There is no

crepitance as well.

HEART:  Regular.  No murmurs.

EXTREMITIES:  No lower extremity edema.  Negative Homans sign.

HEENT:  No scleral icterus.

NECK:  No cervical lymphadenopathy.

ABDOMEN:  Soft, nontender and nondistended.

NEUROLOGIC:  Alert and oriented x3.  No focal deficits.



LABORATORY DATA:

WBC 8.9, hemoglobin 10.9, hematocrit 33, platelets 180.  BUN 25 and creatinine

1.07.



ASSESSMENT AND PLAN:

An 83-year-old female involved in a fall with nondisplaced right rib fractures

three through seven.  We will admit her for observation.  We will proceed with

atelectasis and pneumonia prophylaxis with early ambulation, sitting upright,

deep breathing exercises as well as incentive spirometer as much as possible. 

She also needs adequate pain control when necessary.  Once ambulating well and

has adequate pain control and no respiratory issues, we will discharge her home.





Job ID: [id number]

DocumentID: [id number]

Dictated Date:  12/12/2018 15:19:08

Transcription Date: 12/12/2018 15:55:20

Dictated By: STEW KNOWLES MD

[facility]

## 2018-12-12 NOTE — DIAGNOSTIC IMAGING REPORT
PROCEDURE: CT chest, abdomen, and pelvis with contrast.



TECHNIQUE: Multiple contiguous axial images were obtained through

the chest, abdomen, and pelvis after the administration of

intravenous contrast.



INDICATION: Fall. Left lower chest pain. Right upper quadrant

abdominal pain.



COMPARISON: 05/02/2012.



FINDINGS:



CT chest: The heart is normal in size. There is no pericardial

effusion. No mediastinal adenopathy is seen. There is mild

calcific atherosclerosis. A moderate-sized hiatal hernia is

present, which has a fluid level. There is dependent atelectasis

in the lungs bilaterally. There is no pleural effusion or

pneumothorax. There are nodular groundglass opacities in the left

upper lobe, with an irregular 8 mm nodular opacity. There are

nondisplaced fractures of the right lateral third through seventh

ribs.



CT abdomen/pelvis: The liver demonstrates mild fatty infiltration

along the falciform ligament but is otherwise unremarkable. The

spleen demonstrates small hypodensities which are thought to be

due to perfusion artifact, as well as a calcified granuloma. The

pancreas is normal. The adrenal glands are normal. The kidneys

demonstrate mild cortical thinning but are otherwise unremarkable

with no laceration or hydronephrosis seen. The ureters appear

normal. There is calcific atherosclerosis of the aorta without

aneurysm or dissection. The bowel loops are nondistended, without

obstruction seen. Calcific stones are seen dependently in the

gallbladder. There is fatty infiltration of the sigmoid colon

wall, which is nonspecific. No free fluid or free air is seen in

the abdomen. There is diffuse osteopenia. There is deformity of

the bilateral pubis and sacrum from remote trauma. No acute

fracture is seen.



IMPRESSION:

1. Nondisplaced fractures of the right lateral third through

seventh ribs, which appear acute. Please correlate with point

tenderness.

2. Nodular opacities in the left upper lobe, may represent

atypical infection or aspiration. Recommend followup in 6-8 weeks

to demonstrate improvement.

3. No acute abdominopelvic abnormality is seen.

4. Cholelithiasis.



Dictated by: 



  Dictated on workstation # [id number]

## 2018-12-12 NOTE — DIAGNOSTIC IMAGING REPORT
INDICATION: Status post fall with left foot pain



AP, oblique, and lateral views of the left foot are obtained at

1018 AM.



There is osteopenia. There is an acute oblique fracture of the

distal aspect of fifth metatarsal with mild displacement. There

is no other acute finding. There is advanced degenerative change

of the first MTP joint with prominent hallux valgus deformity.



IMPRESSION:



Acute fracture of fifth metatarsal as described above.

Degenerative change of first MTP joint with prominent hallux

valgus deformity. Underlying osteopenia.



Dictated by: 



  Dictated on workstation # [id number]

## 2018-12-12 NOTE — ED FALL/INJURY
General


Chief Complaint:  Trauma-Non Activation


Stated Complaint:  FALL


Source:  patient


Exam Limitations:  no limitations





History of Present Illness


Date Seen by Provider:  Dec 12, 2018


Time Seen by Provider:  08:25


Initial Comments


Here with fall this morning.  Apparently tripped related to her left foot pain 

which she has a bunion that seems to be somewhat inflamed.  Landed on her right 

side and hit her head and complains of right rib pain lower margin and right 

upper quadrant abdominal pain.  Denies loss of consciousness.  No bleeding or 

lacerations.  Denies other injury.  Complains of right-sided head pain but 

denies neck pain.


Occurred:  this morning (approximately one hour ago)


Severity:  mild, moderate


Injuries/Pain Location:  head, chest, abdomen


Context:  tripped


Loss of Consciousness:  no loss of consciousness


Modifying Factors:  Worse With Movement; Improves With Rest


Associated Symptoms (Fall):  Abdominal Pain, Chest Pain; No Confusion; Headache

; No Muscle Spasms, No Nausea/Vomiting, No Neck Pain, No Shortness of Air





Allergies and Home Medications


Allergies


Coded Allergies:  


     No Known Drug Allergies (Verified , 10/7/16)





Home Medications


Acetaminophen 500 Mg Tablet, 1,000 MG PO TID


   Prescribed by: FADUMO LLOYD on 10/11/16 2046


Aspirin 81 Mg Tablet.dr, 81 MG PO DAILY, (Reported)


Aspirin 81 Mg Tablet.dr, 81 MG PO DAILY, (Reported)


Ca Cmb No.1/Vit D3/B-6/Fa/B12 1 Each Tablet, 1,000 UNITS PO DAILY, (Reported)


Citalopram Hydrobromide 20 Mg Tablet, 20 MG PO HS, (Reported)


Docosahexanoic Acid/Epa 1 Cap Capsule, 1,000 MG PO BID, (Reported)


Docusate Sodium 100 Mg Capsule, 100 MG PO BID


   Prescribed by: FADUMO LLOYD on 10/11/16 2046


Fluticasone Propionate 9.9 Ml [place].susp, 2 SPRAY NSEACH DAILY


   2 SPRAYS PER NOSTRIL DAILY X 2 DAYS THEN 1 SPRAY DAILY 


   Prescribed by: GRICEL PERRY on 18


Lisinopril 20 Mg Tablet, 20 MG PO DAILY@0900


   Prescribed by: FADUMO LLOYD on 10/11/16 2046


Nebivolol HCl 5 Mg Tablet, 5 MG PO DAILY, (Reported)


Omega 3 Polyunsat Fatty Acids 1,000 Mg Cap, 1,000 MG PO DAILY, (Reported)


Pantoprazole Sodium 40 Mg Tablet.dr, 40 MG PO DAILY@0700


   Prescribed by: FADUMO LLOYD on 10/11/16 2046


Simvastatin 20 Mg Tablet, 20 MG PO HS, (Reported)


Sucralfate 1 Gm Tablet, 1 GM PO ACHS


   Prescribed by: FADUMO LLOYD on 10/11/16 2046


Tramadol HCl 50 Mg Tablet, 50 MG PO TID PRN for PAIN


   Prescribed by: BHAVNA TINEO on 16 1226





Patient Home Medication List


Home Medication List Reviewed:  Yes





Review of Systems


Review of Systems


Constitutional:  see HPI; No chills, No fever


Ears, Nose, Mouth, Throat:  no symptoms reported


Respiratory:  no symptoms reported


Cardiovascular:  see HPI, chest pain (right low ribs); No edema


Gastrointestinal:  abdominal pain (RUQ); No nausea, No vomiting


Genitourinary:  no symptoms reported


Musculoskeletal:  joint pain (left foot great toe at the MTP), joint swelling (

left foot great toe at the MTP)


Skin:  change in color (redness of the left foot); No lesions


Psychiatric/Neurological:  No Symptoms Reported





All Other Systems Reviewed


Negative Unless Noted:  Yes





Past Medical-Social-Family Hx


Past Med/Social Hx:  Reviewed Nursing Past Med/Soc Hx


Patient Social History


Alcohol Use:  Denies Use


Recreational Drug Use:  No


Smoking Status:  Never a Smoker


Recent Hopitalizations:  No





Immunizations Up To Date


Tetanus Booster (TDap):  Unknown


PED Vaccines UTD:  No


Date of Pneumonia Vaccine:  Oct 1, 2017


Date of Influenza Vaccine:  Oct 1, 2017





Seasonal Allergies


Seasonal Allergies:  No





Past Medical History


Surgeries:  No


Respiratory:  No


Cardiac:  Yes (SEES DR. BUSBY UNSURE WHY)


Hypertension


Neurological:  Yes


Dementia


Reproductive Disorders:  No


Female Reproductive Disorders:  Denies


Sexually Transmitted Disease:  No


HIV/AIDS:  No


Genitourinary:  Yes


UTI-Chronic


Gastrointestinal:  No


Musculoskeletal:  Yes


Fractures, Gout


Endocrine:  Yes


Hyperthyroidism


HEENT:  Yes


Cataract


Loss of Vision:  Denies


Hearing Impairment:  Hard of Hearing


Cancer:  No


Psychosocial:  No


Integumentary:  No


Recent Skin Changes


Blood Disorders:  No


Adverse Reaction/Blood Tranf:  No





Family Medical History


Reviewed Nursing Family Hx





Dementia


  G8 SISTER


Diabetes mellitus


  19 FATHER


No Pertinent Family Hx, Diabetes, Psychiatric Problems





Physical Exam


Vital Signs





Vital Signs - First Documented








 18





 08:23


 


Temp 97.9


 


Pulse 78


 


Resp 18


 


B/P (MAP) 171/83 (112)


 


Pulse Ox 99





Capillary Refill :


Height, Weight, BMI


Height: 5'3.00"


Weight: 120lbs. 0.0oz. 54.963850bn; 20.1 BMI


Method:Stated


General Appearance:  WD/WN, no apparent distress


HEENT:  PERRL/EOMI, pharynx normal


Neck:  full range of motion, supple


Cardiovascular:  regular rate, rhythm, no murmur


Respiratory:  lungs clear, normal breath sounds, other (chest pain to right low 

lateral rib margin without bruising or deformity)


Gastrointestinal:  soft; No guarding, No rebound; tenderness (right upper 

quadrant and lateral aspect)


Back:  normal inspection, no CVA tenderness, no vertebral tenderness


Extremities:  normal range of motion, other (tenderness and swelling to the 

left foot at the MTP on the great toe where bunion is noted.  Mild redness 

noted at that area.)


Neurologic/Psychiatric:  alert, normal mood/affect


Skin:  warm/dry, other (erythema noted to the foot as described above)





Leticia Coma Score


Best Eye Response:  (4) Open Spontaneously


Best Verbal Response:  (5) Oriented


Best Motor Response:  (6) Obeys Commands





Progress/Results/Core Measures


Results/Orders


Lab Results





Laboratory Tests








Test


 18


08:38 Range/Units


 


 


White Blood Count


 8.9 


 4.3-11.0


10^3/uL


 


Red Blood Count


 3.67 L


 4.35-5.85


10^6/uL


 


Hemoglobin 10.9 L 11.5-16.0  G/DL


 


Hematocrit 33 L 35-52  %


 


Mean Corpuscular Volume 90  80-99  FL


 


Mean Corpuscular Hemoglobin 30  25-34  PG


 


Mean Corpuscular Hemoglobin


Concent 33 


 32-36  G/DL





 


Red Cell Distribution Width 15.2 H 10.0-14.5  %


 


Platelet Count


 180 


 130-400


10^3/uL


 


Mean Platelet Volume 9.3  7.4-10.4  FL


 


Neutrophils (%) (Auto) 71  42-75  %


 


Lymphocytes (%) (Auto) 19  12-44  %


 


Monocytes (%) (Auto) 9  0-12  %


 


Eosinophils (%) (Auto) 1  0-10  %


 


Basophils (%) (Auto) 0  0-10  %


 


Neutrophils # (Auto) 6.3  1.8-7.8  X 10^3


 


Lymphocytes # (Auto) 1.7  1.0-4.0  X 10^3


 


Monocytes # (Auto) 0.8  0.0-1.0  X 10^3


 


Eosinophils # (Auto)


 0.1 


 0.0-0.3


10^3/uL


 


Basophils # (Auto)


 0.0 


 0.0-0.1


10^3/uL


 


Sodium Level 139  135-145  MMOL/L


 


Potassium Level 4.0  3.6-5.0  MMOL/L


 


Chloride Level 103    MMOL/L


 


Carbon Dioxide Level 25  21-32  MMOL/L


 


Anion Gap 11  5-14  MMOL/L


 


Blood Urea Nitrogen 25 H 7-18  MG/DL


 


Creatinine


 1.07 


 0.60-1.30


MG/DL


 


Estimat Glomerular Filtration


Rate 49 


  





 


BUN/Creatinine Ratio 23   


 


Glucose Level 107 H   MG/DL


 


Calcium Level 9.2  8.5-10.1  MG/DL


 


Corrected Calcium 9.6  8.5-10.1  MG/DL


 


Total Bilirubin 0.8  0.1-1.0  MG/DL


 


Aspartate Amino Transf


(AST/SGOT) 26 


 5-34  U/L





 


Alanine Aminotransferase


(ALT/SGPT) 26 


 0-55  U/L





 


Alkaline Phosphatase 58    U/L


 


Total Protein 6.6  6.4-8.2  GM/DL


 


Albumin 3.5  3.2-4.5  GM/DL








My Orders





Orders - NARENDRA ROSAS MD


Cbc With Automated Diff (18 08:21)


Comprehensive Metabolic Panel (18 08:21)


Foot, Left, 3 Views (18 08:21)


Saline Lock/Iv-Start (18 08:21)


Ct Head/Cervical Spine Wo (18 08:21)


Ct Chest/Abdomen/Pelvis W (18 09:13)


Iohexol Injection (Omnipaque 350 Mg/Ml 1 (18 09:30)


Contrast Received (Contrast Received) (18 09:30)


Ns (Ivpb) (Sodium Chloride 0.9% Ivpb Bag (18 09:30)





Medications Given in ED





Current Medications








 Medications  Dose


 Ordered  Sig/Familia


 Route  Start Time


 Stop Time Status Last Admin


Dose Admin


 


 Iohexol  75 ml  ONCE  ONCE


 IV  18 09:30


 18 09:31 DC 18 09:17


75 ML


 


 Sodium Chloride  100 ml  ONCE  ONCE


 IV  18 09:30


 18 09:31 DC 18 09:17


80 ML








Vital Signs/I&O











 18





 08:23


 


Temp 97.9


 


Pulse 78


 


Resp 18


 


B/P (MAP) 171/83 (112)


 


Pulse Ox 99











Progress


Progress Note :  


Progress Note


Seen and evaluated.  IV, labs, CT head and neck ordered.  X-ray left foot 

ordered.  Anticipate CT chest and abdomen but pending creatinine studies.  1103

: I discussed the case with Dr. Gomez after findings of third through seventh 

rib fractures noted.  Patient comfortable but given her age and multiple rib 

fractures, observation admission is indicated.  Dr. Gomez agrees and accepts 

patient for admission.  Requests consult with medicine service.  I did discuss 

the case with Dr. Cox at 1106 who accepts patient for consult.  Patient and 

family agree with plan.  1120: I discussed the case with the patient, patient's 

son and daughter who all agree for admission.





Diagnostic Imaging





   Diagonstic Imaging:  Xray


   Plain Films/CT/US/NM/MRI:  other


Comments


NAME:      DARRION SINCLAIR [facility] REC#:   [id number]


ACCOUNT#:   [id number]


PT STATUS:   REG ER


:      1935


PHYSICIAN:    NARENDRA ROSAS MD


ADMIT DATE:   18/ER


 ***Signed***


Date of Exam:   18





FOOT, LEFT, 3 VIEWS


 





INDICATION: Status post fall with left foot pain





AP, oblique, and lateral views of the left foot are obtained at


1018 AM.





There is osteopenia. There is an acute oblique fracture of the


distal aspect of fifth metatarsal with mild displacement. There


is no other acute finding. There is advanced degenerative change


of the first MTP joint with prominent hallux valgus deformity.





IMPRESSION:





Acute fracture of fifth metatarsal as described above.


Degenerative change of first MTP joint with prominent hallux


valgus deformity. Underlying osteopenia.





Dictated by: 





  Dictated on workstation # [id number]





[id number]





Dict:      18 1037


Trans:      18 1041





Interpreted by:         SUNSHINE CALVILLO MD


Electronically signed by:   SUNSHINE CALVILLO MD 18 1041








   Diagonstic Imaging:  CT


   Plain Films/CT/US/NM/MRI:  chest, abdomen, pelvis


Comments


 [facility].


 [place], [place]





NAME:   DARRION SINCLAIR


[facility] REC#:   [id number]


ACCOUNT#:   [id number]


PT STATUS:   REG ER


:   1935


PHYSICIAN:   NARNEDRA ROSAS MD


ADMIT DATE:   18/ER


 ***Draft***


Date of Exam:18





CT CHEST/ABDOMEN/PELVIS W








PROCEDURE: CT chest, abdomen, and pelvis with contrast.





TECHNIQUE: Multiple contiguous axial images were obtained through


the chest, abdomen, and pelvis after the administration of


intravenous contrast.





INDICATION: Fall. Left lower chest pain. Right upper quadrant


abdominal pain.





COMPARISON: 2012.





FINDINGS:





CT chest: The heart is normal in size. There is no pericardial


effusion. No mediastinal adenopathy is seen. There is mild


calcific atherosclerosis. A moderate-sized hiatal hernia is


present, which has a fluid level. There is dependent atelectasis


in the lungs bilaterally. There is no pleural effusion or


pneumothorax. There are nodular groundglass opacities in the left


upper lobe, with an irregular 8 mm nodular opacity. There are


nondisplaced fractures of the right lateral third through seventh


ribs.





CT abdomen/pelvis: The liver demonstrates mild fatty infiltration


along the falciform ligament but is otherwise unremarkable. The


spleen demonstrates small hypodensities which are thought to be


due to perfusion artifact, as well as a calcified granuloma. The


pancreas is normal. The adrenal glands are normal. The kidneys


demonstrate mild cortical thinning but are otherwise unremarkable


with no laceration or hydronephrosis seen. The ureters appear


normal. There is calcific atherosclerosis of the aorta without


aneurysm or dissection. The bowel loops are nondistended, without


obstruction seen. Calcific stones are seen dependently in the


gallbladder. There is fatty infiltration of the sigmoid colon


wall, which is nonspecific. No free fluid or free air is seen in


the abdomen. There is diffuse osteopenia. There is deformity of


the bilateral pubis and sacrum from remote trauma. No acute


fracture is seen.





IMPRESSION:


1. Nondisplaced fractures of the right lateral third through


seventh ribs, which appear acute. Please correlate with point


tenderness.


2. Nodular opacities in the left upper lobe, may represent


atypical infection or aspiration. Recommend followup in 6-8 weeks


to demonstrate improvement.


3. No acute abdominopelvic abnormality is seen.


4. Cholelithiasis.





  Dictated on workstation # [id number]








Dict:   18 0936


Trans:   18 0954


[facility] [id number]





Interpreted by:     CHRIS BLAIR MD


Electronically signed by:








   Diagonstic Imaging:  CT


   Plain Films/CT/US/NM/MRI:  c-spine, head


Comments


 [facility].


 [place], [place]





NAME:   DARRION SINCLAIR


[facility] REC#:   [id number]


ACCOUNT#:   [id number]


PT STATUS:   REG ER


:   1935


PHYSICIAN:   NARENDRA ROSAS MD


ADMIT DATE:   18/ER


 ***Draft***


Date of Exam:18





CT HEAD/CERVICAL SPINE WO








PROCEDURE: CT head and CT cervical spine without contrast.





TECHNIQUE: Multiple contiguous axial images were obtained through


the brain and cervical spine without the use of intravenous


contrast. Sagittal and coronal reformations through the cervical


spine were then performed.





INDICATION: Fall, injury to left side of the head.





COMPARISON: 2018.





FINDINGS:





CT head: The ventricles and cortical sulci are mildly prominent,


consistent with generalized parenchymal volume loss. There are


focal areas of hypoattenuation in the periventricular and


subcortical white matter, consistent with chronic microvascular


disease. No acute intracranial hemorrhage is seen. There is no CT


evidence of acute territorial ischemia. There is no midline shift


or mass effect. The calvarium is intact.





CT cervical spine: There is grade 1 anterolisthesis at C3-C4,


C4-C5. There is mild reversal of the cervical lordosis at C5.


Moderate degenerative changes are seen at C5-C6 and C6-C7. There


is facet arthropathy bilaterally, left greater than right.


Findings appear similar to 2018. Vertebral body heights are


preserved. No acute fracture is seen. There is calcific


atherosclerosis. Prevertebral soft tissues are unremarkable.





IMPRESSION:


1. No acute intracranial hemorrhage or CT evidence of acute


territorial ischemia. No calvarium fracture seen.


2. Degenerative changes in the cervical spine appear similar to


2018. No acute fracture is seen.





  Dictated on workstation # [id number]








Dict:   18


Trans:   18


[facility] [id number]





Interpreted by:     CHRIS BLAIR MD


Electronically signed by:





Departure


Communication (Admissions)


Time/Spoke to Admitting Phy:  11:03


Time/Spoke to Consulting Phy:  11:06





Impression





 Primary Impression:  


 Multiple fractures of ribs, right side, initial encounter for closed fracture


 Additional Impressions:  


 Minor head injury with loss of consciousness


 Qualified Codes:  S06.9X9A - Unspecified intracranial injury with loss of 

consciousness of unspecified duration, initial encounter


 Nondisplaced fracture of fifth left metatarsal bone


 Qualified Codes:  S92.355A - Nondisplaced fracture of fifth metatarsal bone, 

left foot, initial encounter for closed fracture


Disposition:   ADMITTED AS INPATIENT


Condition:  Stable





Admissions


Decision to Admit Reason:  Admit from ER (Trauma)


Decision to Admit/Date:  Dec 12, 2018


Time/Decision to Admit Time:  11:03





Departure-Patient Inst.


Referrals:  


NI HUMMEL MD (PCP/Family)


Primary Care Physician











NARENDRA ROSAS MD Dec 12, 2018 08:55

## 2018-12-13 NOTE — PROGRESS NOTE (SOAP)
Subjective


Date Seen by a Provider:  Dec 13, 2018


Time Seen by a Provider:  14:00


Subjective/Events-last exam


Patient seen with Dr. Gomez.  Family at bedside.  Patient reports doing ok but 

still has right side rib pain.  Ambulating with PT.  Tolerating diet.  No SOB 

or difficulty breathing.  RN reports patient is confused at times.





Objective


Exam





Vital Signs








  Date Time  Temp Pulse Resp B/P (MAP) Pulse Ox O2 Delivery O2 Flow Rate FiO2


 


18 12:00 99.3 75 18 132/62 (85) 98 Room Air  


 


18 08:00 97.5 68 20 169/82 (111) 99 Room Air  


 


18 08:00     94 Room Air  


 


18 07:00     95 Room Air  


 


18 04:00 97.4 74 20 138/76 (96) 97 Room Air  


 


18 00:00 97.2 69 18 131/71 (91) 96 Room Air  


 


18 20:04     94 Room Air  


 


18 20:00 98.1 87 18 136/63 (87) 94 Room Air  


 


18 20:00     94 Room Air  


 


18 16:50 99.1 72 18 165/70 (101) 94 Room Air  














I & O 


 


 18





 07:00


 


Intake Total 260 ml


 


Output Total 401 ml


 


Balance -141 ml





Capillary Refill : Less Than 3 Seconds


General Appearance:  No Apparent Distress, WD/WN


Neck:  Full Range of Motion, Normal Inspection, Non Tender, Supple


Respiratory:  Lungs Clear, Normal Breath Sounds, No Accessory Muscle Use, No 

Respiratory Distress, Other (Pain with palpation over right ribs.)


Cardiovascular:  Regular Rate, Rhythm, No Edema


Gastrointestinal:  normal bowel sounds, non tender, soft


Extremity:  Normal Capillary Refill, Normal Inspection, Normal Range of Motion


Neurologic/Psychiatric:  Alert, Normal Mood/Affect


Skin:  Normal Color, Warm/Dry





Results


Lab


Laboratory Tests


18 04:22: 


White Blood Count 8.4, Red Blood Count 3.56L, Hemoglobin 10.8L, Hematocrit 32L, 

Mean Corpuscular Volume 89, Mean Corpuscular Hemoglobin 30, Mean Corpuscular 

Hemoglobin Concent 34, Red Cell Distribution Width 15.0H, Platelet Count 184, 

Mean Platelet Volume 9.4, Neutrophils (%) (Auto) 73, Lymphocytes (%) (Auto) 16, 

Monocytes (%) (Auto) 11, Eosinophils (%) (Auto) 0, Basophils (%) (Auto) 0, 

Neutrophils # (Auto) 6.1, Lymphocytes # (Auto) 1.4, Monocytes # (Auto) 0.9, 

Eosinophils # (Auto) 0.0, Basophils # (Auto) 0.0, Sodium Level 139, Potassium 

Level 3.9, Chloride Level 104, Carbon Dioxide Level 23, Anion Gap 12, Blood 

Urea Nitrogen 23H, Creatinine 0.97, Estimat Glomerular Filtration Rate 55, BUN/

Creatinine Ratio 24, Glucose Level 110H, Calcium Level 9.0, Corrected Calcium 

9.5, Total Bilirubin 0.9, Aspartate Amino Transf (AST/SGOT) 22, Alanine 

Aminotransferase (ALT/SGPT) 23, Alkaline Phosphatase 61, Total Protein 6.4, 

Albumin 3.4





Assessment/Plan


Assessment/Plan


Assess & Plan/Chief Complaint


An 83-year-old female involved in a fall with nondisplaced right rib fractures 

three through seven.  


Continue with pain meds as needed.


Ambulating and deep breathing exercises as well as IS.


Atelectasis and pneumonia prophylaxis.


Regular diet.





Clinical Quality Measures


DVT/VTE Risk/Contraindication:


Risk Factor Score Per Nursin


RFS Level Per Nursing on Admit:  4+=Very High











MONTY THEODORE APRN Dec 13, 2018 14:23

## 2018-12-13 NOTE — PHYSICAL THERAPY EVALUATION
PT Evaluation-General


Medical Diagnosis


Admission Date


Dec 12, 2018 at 11:35


Medical Diagnosis:  right 3-7 rib fracture/left 5th metatarsal fracture


Onset Date:  Dec 12, 2018





Therapy Diagnosis


Therapy Diagnosis:  debility





Height/Weight


Height (Feet):  5


Height (Inches):  4.00


Weight (Pounds):  112


Weight (Ounces):  6.0





Precautions


Precautions/Isolations:  Fall Prevention, Standard Precautions





Weight Bear Status


Right Lower Extremity:  Right


Weight Bearing/Tolerated


Left Lower Extremity:  Left


Weight Bearing/Tolerated





Referral


Physician:  Brooke


Reason for Referral:  Evaluation/Treatment





Medical History


Pertinent Medical History:  Arthritis, Dementia, HTN


Additional Medical History


lives with son/patient report she sees ghosts at home and here and they are all 

male and want to do things to her.  Nursing supervisor notified.


Current History


ED secondary to tripping and falling at home landing on right side


Reviewed History:  Yes





Social History


Home:  Single Level


Current Living Status:  Children





Prior/Core FIM


Prior Level of Function


Therapy Code Descriptions/Definitions 





Functional [place] Measure:


0=Not Assessed/NA        4=Minimal Assistance


1=Total Assistance        5=Supervision or Setup


2=Maximal Assistance  6=Modified [place]


3=Moderate Assistance 7=Complete [place]








Therapy Quality Codes:


6    Independent with activity with or without an assistive device


5    Patient requires set up or clean up by helper.  Patient completes activity

  by  themselves


4    Supervision or touching assist (CGA). [place] provide cues , steadying 

assist


3    The helper provides less than half the effort to complete the activity


2    The helper provides more than half the effort to complete the activity


1    Dependent.  The helper does all the effort to complete an activity 


7    Patient refused to complete or attempt activity


9    The patient did not perform the activity before the current illness or 

injury


88  Not attempted due to Medical conditions or safety concerns





Functional Abilities and Goals:


Independent: Patient completed the activities by him/herself, with or without 

an assistive device, with no assistance from a helper.


Needed Some Help: Patient needed partial assistance from another person to 

complete activities.


Dependent: A helper completed the activities for the patient. 


Unknown:


Not Applicable:


Bed Mobility:  5


Transfers (B,C,W/C) (FIM):  5


Gait:  5


Indoor Mobility (Ambulation):  Needed Some Help


Stairs:  Needed Some Help


Prior Devices Use:  Walker


Prior Device Use:  FWW





PT Evaluation-Current


Subjective


Patient agrees to PT.  Patient informs this PT about the ghosts in her room.  

Nursing supervisor notified.





Pain





   Numeric Pain Scale:  0-No Pain


   Location:  No Pain Reported





Objective


Patient Orientation:  Confused


Problem Solving:  Poor


Attachments:  IV





ROM/Strength


ROM Lower Extremities


bilateral LE WFL


Strength Lower Extremities


3+/5 grossly bilaterally





Integumentary/Posture


Integumentary


refer to nursing notes


Bowel Incontinence:  No


Bladder Incontinence:  No


Posture


WFL





Neuromuscular


(Tone, Coordination, Reflexes)


diminished coordination due to age and dementia





Sensory


Vision:  Functional


Hearing:  Functional


Sensation Right Lower Extremit:  Impaired


Sensation Left Lower Extremity:  Impaired





Transfers


Therapy Code Descriptions/Definitions 





Functional [place] Measure:


0=Not Assessed/NA        4=Minimal Assistance


1=Total Assistance        5=Supervision or Setup


2=Maximal Assistance  6=Modified [place]


3=Moderate Assistance 7=Complete [place]


Transfers (B, C, W/C) (FIM):  5


Scootin


Rollin


Supine to/from Sit:  5


Sit to/from Stand:  5





Gait


Mode of Locomotion:  Walk


Anticipated Mode of Locomotion:  Walk


Gait (FIM):  5


Distance (FIM):  3=150 ft


Distance:  250'


Gait Level of Assist:  5


Gait Assistive Device:  FWW


Comments/Gait Description


extended UE's with FWW use requiring skilled verbal instruction for body 

placement in FWW/steady gait sequence with minimal to no toe off bilaterally 

and WBOS





Balance


Sitting Static:  Normal


Sitting Dynamic:  Normal


Standing Static:  Normal


 Standing Dynamic:  Normal





Assessment/Needs


83 y.o. female, will be seen short term by skilled PT to address functional 

strength and mobility to ensure safe return to home or care facility at maximum 

LOF.


Rehab Potential:  Fair





PT Long Term Goals


Long Term Goals


PT Long Term Goals Time Frame:  Dec 22, 2018


Transfers (B,C,W/C) (FIM):  6


Gait (FIM):  5


Gait distance (FIM):  3=150 ft


Distance:  250'


Gait Level of Assist:  5


Gait Assistive Device:  FWW





PT Plan


Problem List


Problem List:  Safety





Treatment/Plan


Treatment Plan:  Continue Plan of Care


Treatment Plan:  Education, Functional Activity Nell, Functional Strength, Gait

, Safety, Therapeutic Exercise, Transfers


Treatment Duration:  Dec 22, 2018


Frequency:  6 times per week


Estimated Hrs Per Day:  .25 hour per day


Patient and/or Family Agrees t:  Yes





Safety Risks/Education


Patient Education:  Issued Written HEP, Safety Issues


Teaching Methods:  Demonstration


Response to Teaching:  Verbalize Understanding, Return Demonstration, 

Reinforcement Needed





Discharge Recommendations


Therapy D/C Recommendations:  Nursing Home Placement





Time/GCodes


Time In:  1301


Time Out:  1316


Total Billed Treatment Time:  15


Total Billed Treatment


1 visit


EVModC 15 min


G Codes Necessary:  Yes





PT/OT Therapy GCodes


Therapy


Functional Limitation:  Physical Therapy


Test(s)/Tool used to determine:  Level of Assistance Scale





Functional Limitation-Current


Charge Code:  MOBCUR


Modifier:  CJ





Functional Limitation-Goal


Charge Code:  MOBGOAL


Modifier:  NICKI BUTCHER PT Dec 13, 2018 13:47

## 2018-12-13 NOTE — CONSULTATION ([facility])
HPI


History of Present Illness:


84 yo admitted after fall in which she suffered rib fractures. Patient laying 

in bed this AM. States that her pain is well controlled. Denies any shortness 

of breath, fevers or chills.


Source:  patient


Exam Limitations:  no limitations


Date seen by provider:  Dec 13, 2018


Time Seen by Provider:  10:05


Attending Physician


Vega Gomez MD


PCP


Joss Roland MD


Consult





Date of Admission


Dec 12, 2018 at 11:35





Home Medications


Home Medications


Reviewed patient Home Medication Reconciliation performed by pharmacy 

medication reconciliations technician and/or nursing.


Patients Allergies have been reviewed.





Allergies


Coded Allergies:  


     No Known Drug Allergies (Verified , 10/7/16)





PMH-Social-Family Hx


Patient Social History


Living Status:  Lives with son and daughter in law


Alcohol Use:  Denies Use


Recreational Drug Use:  No


Smoking Status:  Never a Smoker


Recent Foreign Travel:  No


Contact w/other who traveled:  No


Recent Hopitalizations:  No


Recent Infectious Disease Expo:  No


Physical Abuse Screen:  No


Sexual Abuse:  No





Immunizations Up To Date


Tetanus Booster (TDap):  Unknown


Date of Pneumonia Vaccine:  Oct 1, 2017


Date of Influenza Vaccine:  Oct 1, 2017





Past Medical History





PMHx:


HTN


HLD





PSurgHx: none





Family Medical History


Significant Family History:  No Pertinent Family Hx, Diabetes, Psychiatric 

Problems


Family History:  


Dementia


  G8 SISTER


Diabetes mellitus


  19 FATHER





Review of Systems ([facility])


Constitutional:  no symptoms reported; No chills, No fever


EENTM:  no symptoms reported


Respiratory:  no symptoms reported; No cough, No dyspnea on exertion, No short 

of breath; other (chest pain)


Cardiovascular:  no symptoms reported; No chest pain, No edema, No palpitations


Gastrointestinal:  no symptoms reported; No abdominal pain, No nausea, No 

vomiting


Genitourinary:  no symptoms reported; No dysuria, No frequency, No hematuria


Musculoskeletal:  other (chest wall pain)


Skin:  no symptoms reported


Psychiatric/Neurological:  Weakness





Reviewed Test Results


Reviewed Test Results


Lab





Laboratory Tests








Test


 18


04:22 Range/Units


 


 


White Blood Count


 8.4 


 4.3-11.0


10^3/uL


 


Red Blood Count


 3.56 L


 4.35-5.85


10^6/uL


 


Hemoglobin 10.8 L 11.5-16.0  G/DL


 


Hematocrit 32 L 35-52  %


 


Mean Corpuscular Volume 89  80-99  FL


 


Mean Corpuscular Hemoglobin 30  25-34  PG


 


Mean Corpuscular Hemoglobin


Concent 34 


 32-36  G/DL





 


Red Cell Distribution Width 15.0 H 10.0-14.5  %


 


Platelet Count


 184 


 130-400


10^3/uL


 


Mean Platelet Volume 9.4  7.4-10.4  FL


 


Neutrophils (%) (Auto) 73  42-75  %


 


Lymphocytes (%) (Auto) 16  12-44  %


 


Monocytes (%) (Auto) 11  0-12  %


 


Eosinophils (%) (Auto) 0  0-10  %


 


Basophils (%) (Auto) 0  0-10  %


 


Neutrophils # (Auto) 6.1  1.8-7.8  X 10^3


 


Lymphocytes # (Auto) 1.4  1.0-4.0  X 10^3


 


Monocytes # (Auto) 0.9  0.0-1.0  X 10^3


 


Eosinophils # (Auto)


 0.0 


 0.0-0.3


10^3/uL


 


Basophils # (Auto)


 0.0 


 0.0-0.1


10^3/uL


 


Sodium Level 139  135-145  MMOL/L


 


Potassium Level 3.9  3.6-5.0  MMOL/L


 


Chloride Level 104    MMOL/L


 


Carbon Dioxide Level 23  21-32  MMOL/L


 


Anion Gap 12  5-14  MMOL/L


 


Blood Urea Nitrogen 23 H 7-18  MG/DL


 


Creatinine


 0.97 


 0.60-1.30


MG/DL


 


Estimat Glomerular Filtration


Rate 55 


  





 


BUN/Creatinine Ratio 24   


 


Glucose Level 110 H   MG/DL


 


Calcium Level 9.0  8.5-10.1  MG/DL


 


Corrected Calcium 9.5  8.5-10.1  MG/DL


 


Total Bilirubin 0.9  0.1-1.0  MG/DL


 


Aspartate Amino Transf


(AST/SGOT) 22 


 5-34  U/L





 


Alanine Aminotransferase


(ALT/SGPT) 23 


 0-55  U/L





 


Alkaline Phosphatase 61    U/L


 


Total Protein 6.4  6.4-8.2  GM/DL


 


Albumin 3.4  3.2-4.5  GM/DL











Physical Exam-([facility])


Physical Exam


Vital Signs





 VS - Last 72 Hours, by Label








 18





 08:23 11:55 12:04 13:36


 


Temp 97.9  99.5 


 


Pulse 78 73 69 78


 


Resp 18 20 20 


 


B/P (MAP) 171/83 (112) 189/81 (117) 177/75 (109) 


 


Pulse Ox 99 97 97 99


 


O2 Delivery   Room Air 


 


    





 18





 14:08 16:50 20:00 20:00


 


Temp  99.1  98.1


 


Pulse  72  87


 


Resp  18  18


 


B/P (MAP)  165/70 (101)  136/63 (87)


 


Pulse Ox  94 94 94


 


O2 Delivery Room Air Room Air Room Air Room Air


 


    





 18





 20:04 00:00 04:00 07:00


 


Temp  97.2 97.4 


 


Pulse  69 74 


 


Resp  18 20 


 


B/P (MAP)  131/71 (91) 138/76 (96) 


 


Pulse Ox 94 96 97 95


 


O2 Delivery Room Air Room Air Room Air Room Air


 


    





 18





 08:00 08:00 12:00 16:00


 


Temp  97.5 99.3 98.9


 


Pulse  68 75 72


 


Resp  20 18 17


 


B/P (MAP)  169/82 (111) 132/62 (85) 153/67 (95)


 


Pulse Ox 94 99 98 99


 


O2 Delivery Room Air Room Air Room Air Room Air





Capillary Refill : Less Than 3 Seconds


General Appearance:  WD/WN, no apparent distress, thin


HEENT:  PERRL/EOMI


Neck:  non-tender, full range of motion, supple


Respiratory:  chest non-tender, lungs clear, normal breath sounds


Cardiovascular:  normal peripheral pulses, regular rate, rhythm, no murmur


Gastrointestinal:  normal bowel sounds, non tender, soft


Extremities:  normal range of motion, non-tender, no pedal edema, no calf 

tenderness, normal capillary refill


Neurologic/Psychiatric:  CNs II-XII nml as tested, no motor/sensory deficits, 

alert, normal mood/affect, oriented x 3


Skin:  normal color, warm/dry


Lymphatic:  no adenopathy





Assessment/Plan


Assessment/Plan


Admission Status:  Observation





(1) Multiple fractures of ribs, right side, initial encounter forclosed fracture


Status:  Acute


Assessment & Plan:  -- Started PO pain medication, OOB and ambulation, 

Checking Vit D, Needs DEXA scan as outpatient





(2) Nondisplaced fracture of fifth left metatarsal bone


Status:  Acute


Qualifiers:  


   Qualified Codes:  S92.355A - Nondisplaced fracture of fifth metatarsal bone, 

left foot, initial encounter for closed fracture


(3) Debility


Status:  Acute


Assessment & Plan:  - PT/OT ordered





(4) Advanced age


Status:  Acute





Clinical Quality Measures


DVT/VTE Risk/Contraindication:


Risk Factor Score Per Nursin


RFS Level Per Nursing on Admit:  4+=Very High





Copy


Copies To 1:   CARLY ALEJANDRO MD Dec 13, 2018 19:57

## 2018-12-13 NOTE — DIAGNOSTIC IMAGING REPORT
INDICATION: Rib fracture followup.



FINDINGS: There are nondisplaced fractures of the right fifth,

sixth and seventh ribs. There is no effusion or pneumothorax.

Heart size and pulmonary vascularity are normal.



IMPRESSION: Nondisplaced right rib fractures. No acute

abnormality seen.



Dictated by: 



  Dictated on workstation # [id number]

## 2018-12-14 NOTE — PHYSICAL THERAPY DAILY NOTE
PT Daily Note-Current


Subjective


Patient agrees to PT.  No c/o.





Pain





   Numeric Pain Scale:  0-No Pain


   Location:  No Pain Reported





Mental Status


Patient Orientation:  Confused


Attachments:  IV





Transfers


Therapy Code Descriptions/Definitions 





Functional [place] Measure:


0=Not Assessed/NA        4=Minimal Assistance


1=Total Assistance        5=Supervision or Setup


2=Maximal Assistance  6=Modified [place]


3=Moderate Assistance 7=Complete [place]








Therapy Quality Codes:


6    Independent with activity with or without an assistive device


5    Patient requires set up or clean up by helper.  Patient completes activity

  by  themselves


4    Supervision or touching assist (CGA). [place] provide cues , steadying 

assist


3    The helper provides less than half the effort to complete the activity


2    The helper provides more than half the effort to complete the activity


1    Dependent.  The helper does all the effort to complete an activity 


7    Patient refused to complete or attempt activity


9    The patient did not perform the activity before the current illness or 

injury


88  Not attempted due to Medical conditions or safety concerns


Transfers (B, C, W/C) (FIM):  5


Scootin


Rollin


Supine to/from Sit:  5


Sit to/from Stand:  5


Bed to/from Chair:  5





Weight Bearing


Right Lower Extremity:  Right


Weight Bearing/Tolerated


Left Lower Extremity:  Left


Weight Bearing/Tolerated





Gait Training


Gait (FIM):  5


Distance (FIM):  3=150 ft


Distance:  250'


Gait Level of Assist:  5


Gait Assistive Device:  FWW


instruction for body placement in FWW/noted extended UE's with use





Assessment


Patient tolerated treatment well and is up in recliner with chair alarm 

activated.





PT Long Term Goals


Long Term Goals


PT Long Term Goals Time Frame:  Dec 22, 2018


Transfers (B,C,W/C) (FIM):  6


Gait (FIM):  5


Gait distance (FIM):  3=150 ft


Distance:  250'


Gait Level of Assist:  5


Gait Assistive Device:  FWW





PT Plan


Treatment/Plan


Treatment Plan:  Continue Plan of Care


Treatment Plan:  Education, Functional Activity Nell, Functional Strength, Gait

, Safety, Therapeutic Exercise, Transfers


Treatment Duration:  Dec 22, 2018


Frequency:  6 times per week


Estimated Hrs Per Day:  .25 hour per day


Patient and/or Family Agrees t:  Yes





Time/GCodes


Time In:  1005


Time Out:  1015


Total Billed Treatment Time:  10


Total Billed Treatment


1 visit


GT 10 min





PT/OT Therapy GCodes


Therapy


Functional Limitation:  Physical Therapy


Test(s)/Tool used to determine:  Level of Assistance Scale





Functional Limitation-Current


Charge Code:  MOBCUR


Modifier:  SHERI





Functional Limitation-Goal


Charge Code:  MOBGOAL


Modifier:  CI





Functional Limitation-D/C


Charge Codes:  MOBDC


Modifier:  NICKI KEEN PT Dec 14, 2018 10:55

## 2018-12-14 NOTE — DISCHARGE INST-SURGICAL
D/C Lap Instructions-[facility]


Follow Up Appt in 2 weeks





Activity as tolerated, ambulate as much as possible.








Incentive Spirometry use every 2 hours while awake





Regular Diet





Symptoms to Report: Fever over 101 degree F, Nausea/Vomiting 


Infection Signs and Symptoms to report:  Increased redness, Foul odor of wound, 

Increased drainage





Bathing instructions: May shower


Operative Area Clean/Dry;  Keep incision clean/dry





If any problems/questions: Contact your physician or go to Emergency Room











STEW KNOWLES MD Dec 14, 2018 14:45

## 2018-12-14 NOTE — PROGRESS NOTE (SOAP)
Subjective


Subjective/Events-last exam


Patient sitting up in chair. States that pain is well controlled. Walking with 

PT well. 





Patient stable to d/c when ok with primary


Review of Systems


Date Seen by Provider:  Dec 14, 2018


Time Seen by Provider:  10:15


Pulmonary:  No Dyspnea, No Cough


Cardiovascular:  Other (chest wall pain)


Gastrointestinal:  No: Nausea, Vomiting


Musculoskeletal:  No: neck pain, back pain


Neurological:  Weakness





Objective


Exam


Last Set of Vital Signs





Vital Signs








  Date Time  Temp Pulse Resp B/P (MAP) Pulse Ox O2 Delivery O2 Flow Rate FiO2


 


18 12:00 99.1 76 20 150/75 (100) 98 Room Air  





Capillary Refill : Less Than 3 SecondsLess Than 3 Seconds


I&O











Intake and Output 


 


 18





 00:00


 


Intake Total 960 ml


 


Output Total 1 ml


 


Balance 959 ml


 


 


 


Intake Oral 960 ml


 


Output Urine Total 1 ml


 


# Voids 8








General:  Alert, Cooperative


Lungs:  Clear to Auscultation, Normal Air Movement


Heart:  Regular Rate, No Murmurs, Other (chest wall pain)


Abdomen:  Normal Bowel Sounds, Soft, No Tenderness, No Masses


Extremities:  No Edema, No Tenderness/Swelling





Results/Procedures


Lab


Laboratory Tests


18 06:34: 


White Blood Count 8.1, Red Blood Count 3.48L, Hemoglobin 10.3L, Hematocrit 31L, 

Mean Corpuscular Volume 89, Mean Corpuscular Hemoglobin 30, Mean Corpuscular 

Hemoglobin Concent 33, Red Cell Distribution Width 15.1H, Platelet Count 178, 

Mean Platelet Volume 9.4, Neutrophils (%) (Auto) 72, Lymphocytes (%) (Auto) 17, 

Monocytes (%) (Auto) 11, Eosinophils (%) (Auto) 0, Basophils (%) (Auto) 0, 

Neutrophils # (Auto) 5.8, Lymphocytes # (Auto) 1.4, Monocytes # (Auto) 0.9, 

Eosinophils # (Auto) 0.0, Basophils # (Auto) 0.0, Sodium Level 139, Potassium 

Level 3.5L, Chloride Level 107, Carbon Dioxide Level 22, Anion Gap 10, Blood 

Urea Nitrogen 16, Creatinine 0.93, Estimat Glomerular Filtration Rate 58, BUN/

Creatinine Ratio 17, Glucose Level 100, Calcium Level 8.4L





Assessment/Plan


Assessment/Plan





(1) Multiple fractures of ribs, right side, initial encounter forclosed fracture


Status:  Acute


Assessment & Plan:  -- Started PO pain medication, OOB and ambulation, 

Checking Vit D, Needs DEXA scan as outpatient


- Ok to discharge when ok with primary





(2) Nondisplaced fracture of fifth left metatarsal bone


Status:  Acute


Qualifiers:  


   Qualified Codes:  S92.355A - Nondisplaced fracture of fifth metatarsal bone, 

left foot, initial encounter for closed fracture


(3) Debility


Status:  Acute


Assessment & Plan:  - PT/OT ordered





(4) Advanced age


Status:  Acute





Clinical Quality Measures


DVT/VTE Risk/Contraindication:


Risk Factor Score Per Nursin


RFS Level Per Nursing on Admit:  4+=Very High











CARYL BLAND MD Dec 14, 2018 16:03

## 2018-12-14 NOTE — PROGRESS NOTE (SOAP)
Subjective


Date Seen by a Provider:  Dec 14, 2018


Time Seen by a Provider:  10:00


Subjective/Events-last exam


doing well. ambulating well. pain controlled with PO pain meds. still having 

mild difficulty with deep inspiration and IS. tolerating reg diet.





Objective


Exam





Vital Signs








  Date Time  Temp Pulse Resp B/P (MAP) Pulse Ox O2 Delivery O2 Flow Rate FiO2


 


18 08:36 97.8 79 18 169/70 (103) 98 Room Air  


 


18 08:00      Room Air  


 


18 07:08     95 Room Air  


 


18 06:13    140/66 (90)    


 


18 04:00 98.9 73 16  97 Room Air  


 


18 00:00 98.4 81 18 140/67 (91) 94 Room Air  


 


18 21:40     96 Room Air  


 


18 20:10 98.9 78 18 150/70 (96) 97 Room Air  


 


18 20:00      Room Air  


 


18 16:00 98.9 72 17 153/67 (95) 99 Room Air  


 


18 12:00 99.3 75 18 132/62 (85) 98 Room Air  














I & O 


 


 18





 06:59


 


Intake Total 2110 ml


 


Balance 2110 ml





Capillary Refill : Less Than 3 SecondsLess Than 3 Seconds


General Appearance:  No Apparent Distress


HEENT:  PERRL/EOMI


Neck:  Full Range of Motion


Respiratory:  Decreased Breath Sounds


Cardiovascular:  Regular Rate, Rhythm


Gastrointestinal:  normal bowel sounds, non tender, soft


Extremity:  Normal Capillary Refill


Neurologic/Psychiatric:  Alert, Oriented x3


Skin:  Normal Color


Lymphatic:  No Adenopathy





Results


Lab


Laboratory Tests


18 06:34: 


White Blood Count 8.1, Red Blood Count 3.48L, Hemoglobin 10.3L, Hematocrit 31L, 

Mean Corpuscular Volume 89, Mean Corpuscular Hemoglobin 30, Mean Corpuscular 

Hemoglobin Concent 33, Red Cell Distribution Width 15.1H, Platelet Count 178, 

Mean Platelet Volume 9.4, Neutrophils (%) (Auto) 72, Lymphocytes (%) (Auto) 17, 

Monocytes (%) (Auto) 11, Eosinophils (%) (Auto) 0, Basophils (%) (Auto) 0, 

Neutrophils # (Auto) 5.8, Lymphocytes # (Auto) 1.4, Monocytes # (Auto) 0.9, 

Eosinophils # (Auto) 0.0, Basophils # (Auto) 0.0, Sodium Level 139, Potassium 

Level 3.5L, Chloride Level 107, Carbon Dioxide Level 22, Anion Gap 10, Blood 

Urea Nitrogen 16, Creatinine 0.93, Estimat Glomerular Filtration Rate 58, BUN/

Creatinine Ratio 17, Glucose Level 100, Calcium Level 8.4L





Assessment/Plan


Assessment/Plan


Assess & Plan/Chief Complaint


same level fall with right non-displaced rib fx 3-7.


increase ambulation and IS.


home when logistically able.





Clinical Quality Measures


DVT/VTE Risk/Contraindication:


Risk Factor Score Per Nursin


RFS Level Per Nursing on Admit:  4+=Very High











STEW KNOWLES MD Dec 14, 2018 10:53

## 2018-12-31 NOTE — DIAGNOSTIC IMAGING REPORT
INDICATION: Not provided



COMPARISON: 12/13/2018



FINDINGS: Single frontal view of the chest demonstrates normal

heart size and pulmonary vascularity. The lungs are well aerated

and clear. No large pleural effusion or pneumothorax is seen. The

visualized osseous structures show no acute abnormalities. Note

is made of aortic atherosclerosis.



IMPRESSION: 

1. No acute cardiopulmonary process.  



Dictated by: 



  Dictated on workstation # [id number]

## 2018-12-31 NOTE — XMS REPORT
[facility]

 Created on: 2018



Landis Kely

External Reference #: [id number]

: 1935

Sex: Female



Demographics







 Address  [address] [place]  [address]

 

 Preferred Language  Unknown

 

 Marital Status  Unknown

 

 [employer] Affiliation  Unknown

 

 Race  Unknown

 

 Ethnic Group  Unknown





Author







 Author  ODIN GAUTHIER

 

 Organization  [facility]

 

 Address  [address] [place]  [address]



 

 Phone  [phone]







Care Team Providers







 Care Team Member Name  Role  Phone

 

 ODIN GAUTHIER  Unavailable  [phone]







PROBLEMS







 Type  Condition  ICD9-CM Code  KCG32-NH Code  Onset Dates  Condition Status  
SNOMED Code

 

 Problem  Hypertension     I10     Active  35681886

 

 Problem  Dementia in other diseases classified elsewhere without behavioral 
disturbance     F02.80     Active  452707399

 

 Problem  Ataxia     R27.0     Active  41058233

 

 Problem  Hyperlipidemia     E78.5     Active  31525034

 

 Problem  Delusional disorder     F22     Active  95220325

 

 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 
condition     F28     Active  20872665

 

 Problem  Psychosis, unspecified psychosis type     F29     Active  97653259

 

 Problem  Alzheimers disease with late onset     G30.1     Active  67826932

 

 Problem  Unspecified dementia without behavioral disturbance     F03.90     
Active  35415861

 

 Problem  Hallucinations     R44.3     Active  8487829







ALLERGIES

No Information



ENCOUNTERS







 Encounter  Location  Date  Diagnosis

 

 [place] [address] [address]-
5244  04 Sep, 2018   

 

 [place] [address] [address]-
7531  09 Aug, 2018   

 

 [place] [address] [address]-
7325  08 Aug, 2018  Hallucinations R44.3 ; Hypertension I10 and Toe pain, right 
M79.674

 

 [place] [address] [address]-
5728    Hallucinations R44.3 ; Alzheimers disease with late onset 
G30.1 and Toe pain, right M79.674

 

 [place] [address] [place] [id number] [place] [address] 71177-
2975     

 

 [place] [address] [address]
8682  19 2018  Hallucinations R44.3

 

 [facility]  [id number] [place] [address] 62434-
5356  14 2018   

 

 [facility]  [id number] [place] [address] 19233-
1716  13 2018  Delusional disorder F22 and Psychosis, unspecified 
psychosis type F29

 

 [facility]  [address] [id number] [place] [address] 19771-
9370     

 

 [facility]  [id number] [place] [address] 49568-
3478     

 

 [facility]  [address] [address] [id number] [place] [address] 02702-
3774  22 May, 2018  Local infection of the skin and subcutaneous tissue, 
unspecified L08.9 and Other injury of unspecified body region, initial 
encounter T14.8XXA

 

 [facility]  [address] [id number] [address]
4753  17 May, 2018   

 

 [facility]  [address] [id number] [place] [address] 28481-
9559  10 May, 2018   

 

 [facility]  [address] [id number] [place] [address] 64228-
0233    Hallucinations R44.3

 

 [facility]  [address] [place] [address] 24604-
7811  19 Mar, 2018  Psychosis, unspecified psychosis type F29

 

 [facility]  [address] [address]-
5973  15 Mar, 2018   

 

 [facility]  [address] [id number] [place] [address] 89049-
4667  13 Mar, 2018   

 

 [facility]  [address] [address]-
7723    Unspecified dementia without behavioral disturbance F03.90 
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 
condition F28

 

 [place] [address] [place] [id number] [place] [address] 84060-
2543     

 

 [place] [address] [address]-
7725     

 

 [facility]  [address] [place] [id number] [place] [address] 04307-
0511     

 

 [place] [address] [address]-
1986    Dementia, unspecified, without behavioral disturbance F03.90

 

 [place] [address] [place] [id number] [place] [address] 70308-
1063  06 Dec, 2017  Medicare annual wellness visit, initial Z00.00 and 
Encounter for immunization Z23

 

 [place] [address] [place] [id number] [place] [address] 34155-
3460    Ataxia R27.0 and Hallucinations R44.3

 

 [place] [address] [place] [id number] [place] [address] 57871-
4152     

 

 [place] [address] [place] [address] [place] [address] 74441-
2967  12 Oct, 2017  Encounter for immunization Z23

 

 [place] [address] [place] [address] [place] [address] 70493-
1231  11 Sep, 2017  Hallucinations R44.3

 

 [place] [address] [place] [address] [place] [address] 63817-
7673  05 Sep, 2017  Hallucinations R44.3

 

 [place] [address] [place] [address] [place] [address] 96322-
4966  30 Aug, 2017  Arthralgia of left knee M25.562 ; Age-related nuclear 
cataract of both eyes H25.13 and Hallucinations R44.3

 

 [place] [address] [place] [address] [place] [address] 93736-
9814  24 Aug, 2017   

 

 [place] [address] [place] [address] [place] [address] 69381-
5045  14 Aug, 2017   

 

 [facility]  [address] [id number] [place] [address] 27800-
8595    Hyperlipidemia E78.5 and Hypertension I10

 

 [facility]  [address] [id number] [place] [address] 08185-
4490    Hyperlipidemia E78.5 and Hypertension I10

 

 [facility]  [address] [id number] [place] [address] 54274-
5224     

 

 [facility]  [address] [place] [id number] [place] [address] 82092-
6660  15 Feb, 2017  Hypertension I10 ; Alzheimers disease with late onset G30.1 
; Dementia in other diseases classified elsewhere without behavioral 
disturbance F02.80 and Ataxia R27.0

 

 [facility]  [address] [place] [id number] [place] [address] 36506-
3815     

 

 [facility]  [address] [place] [id number] [place] [address] 11335-
3290     

 

 [facility]  [address] [place] [id number] [place] [address] 12865-
5973    Hypertension I10 and Ataxia R27.0

 

 [facility]  [address] [place] [id number] [place] [address] 67211-
5680  14 Oct, 2016   

 

 [facility]  [address] [place] [id number] [place] [address] 54424-
3379  26 Sep, 2016   

 

 [facility]  [address] [place] [id number] [place] [address] 78018-
9979  23 Sep, 2016   

 

 [facility]  [address] [place] [id number] [place] [address] 06595-
2885  23 Sep, 2016   

 

 [facility]  [address] [place] [id number] [place] [address] 35688-
3356  20 Sep,    

 

 [facility]  [address] [place] [id number] [place] [address] 41272-
7575  16 Sep, 2016   

 

 [facility]  [address] [id number] [place] [address] 96208
-[phone]  13 Aug, 2016  Urinary frequency R35.0 and Acute cystitis without 
hematuria N30.00

 

 [place] [address] 70[address]-
2896     

 

 [facility]  [address] [address]65-
4693     

 

 [place] [address] [address]-
0432  11 Mar, 2016  Hyperlipidemia E78.5

 

 [place] [address] [address]-
5767  10 Mar, 2016  Ataxia R27.0 ; Hyperlipidemia E78.5 and Hypertension I10

 

 [place] [address] 85231-
4151     

 

 [place] [address] 395[address]-
9594    Ataxia R27.0 ; Senile cataracts of both eyes H25.9 and 
Constipation, unspecified constipation type K59.00

 

 [place] [address] [address]-
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

 

 [place] [address] [address]-
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

 

 [place] [address] 86879-
7465  21 Aug, 2015  Dementia 294.20

 

 [place] [address] 8[address]10-
4302  13 Aug, 2015  Establishing care with new doctor, encounter for V65.8 ; 
Ataxia 781.3 ; Horizontal nystagmus 379.56 ; Hypertension 401.9 ; 
Hyperlipidemia 272.4 and History of TIA (transient ischemic attack) V12.54

 

 [place] [address] 12[address]-
4734  06 Aug, 2015   

 

 [facility]  [address] [facility] [id number] [place] [address]-
0869  05 Aug, 2015   







IMMUNIZATIONS

No Known Immunizations



SOCIAL HISTORY

Never Assessed



REASON FOR VISIT

Hospice question



PLAN OF CARE





VITAL SIGNS





MEDICATIONS

Unknown Medications



RESULTS

No Results



PROCEDURES

No Known procedures



INSTRUCTIONS





MEDICATIONS ADMINISTERED

No Known Medications



MEDICAL (GENERAL) HISTORY







 Type  Description  Date

 

 Medical History  coronary artery disease   

 

 Medical History  Carotid stenosis   

 

 Medical History  hypertension   

 

 Medical History  hyperlipidemia   

 

 Medical History  mitral valve regurgitation   

 

 Medical History  vitamin D deficiency   

 

 Medical History  nystagmus   

 

 Medical History  chest pain syndrome/palpitations   

 

 Medical History  ataxia/unsteady gait   

 

 Medical History  TIAs   

 

 Medical History  stress test 2012 EF 81%; 2014 EF 78%   

 

 Medical History  echo 2012 EF 60% mild MR,TR, AV stenosis; 2014 EF 60% 
aortic regurgitatio, MR, TR   

 

 Medical History  carotid duplex 2012 <40% Bilat; 2014 <40% bilat   

 

 Medical History  cataracts   

 

 Surgical History  left cataract surgery  2018

 

 Hospitalization History  falls   

 

 Hospitalization History  left hip pain, age-indeterminate pubic rami fracture--
[facility]  16

## 2018-12-31 NOTE — XMS REPORT
[facility]

 Created on: 2018



Kely Landis

External Reference #: [id number]

: 1935

Sex: Female



Demographics







 Address  [address] 

[place]  [address]

 

 Preferred Language  Unknown

 

 Marital Status  Unknown

 

 [employer] Affiliation  Unknown

 

 Race  Unknown

 

 Ethnic Group  Unknown





Author







 Author  ODIN GAUTHIER

 

 Organization  [facility]

 

 Address  [address] [place]  [address]



 

 Phone  [phone]







Care Team Providers







 Care Team Member Name  Role  Phone

 

 ODIN GAUTHIER  Unavailable  [phone]







PROBLEMS







 Type  Condition  ICD9-CM Code  ZFM52-UQ Code  Onset Dates  Condition Status  
SNOMED Code

 

 Problem  Hypertension     I10     Active  90175497

 

 Problem  Dementia in other diseases classified elsewhere without behavioral 
disturbance     F02.80     Active  017080337

 

 Problem  Ataxia     R27.0     Active  29102105

 

 Problem  Hyperlipidemia     E78.5     Active  90026101

 

 Problem  Delusional disorder     F22     Active  15895556

 

 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 
condition     F28     Active  34232988

 

 Problem  Psychosis, unspecified psychosis type     F29     Active  28445253

 

 Problem  Alzheimers disease with late onset     G30.1     Active  77717628

 

 Problem  Unspecified dementia without behavioral disturbance     F03.90     
Active  13272451

 

 Problem  Hallucinations     R44.3     Active  6148491







ALLERGIES

No Information



ENCOUNTERS







 Encounter  Location  Date  Diagnosis

 

 [facility]  [address] [place] [address] [id number] [place] [address] 56701-
8486  21 Dec, 2018   

 

 [facility]  [address] [id number] [place] [address] 54452-
6023  12 Dec, 2018   

 

 [facility]  [address] [place] [address] [id number] [place] [address] 45520-
4313  11 Sep, 2018   

 

 [facility]  [id number] [place] [address] 10935
-[phone]  11 Sep, 2018  Unspecified injury of left ankle, initial encounter 
S99.912A and Unspecified injury of left foot, initial encounter S99.922A

 

 [facility]  [address] [place] [address] [id number] [place] [address] 91131-
3710  04 Sep, 2018  Hallucinations R44.3

 

 [facility]  [address] [address]-
5849  09 Aug, 2018   

 

 [facility]  [address] [place] [id number] [place] [address] 90214-
0550  08 Aug, 2018  Hallucinations R44.3 ; Hypertension I10 and Toe pain, right 
M79.674

 

 [facility]  [address] [place] [id number] [place] [address] 51250-
0434    Hallucinations R44.3 ; Alzheimers disease with late onset 
G30.1 and Toe pain, right M79.674

 

 [facility]  [id number] [place] [address] 34368-
0279     

 

 [facility]  [address] [id number] [place] [address] 71893-
7825    Hallucinations R44.3

 

 [facility]  [id number] [place] [address] 99606-
5494     

 

 [facility]  [id number] [place] [address] 17081-
8106    Delusional disorder F22 and Psychosis, unspecified 
psychosis type F29

 

 [facility]  [address] [place] [address] [id number] [place] [address] 38361-
5115     

 

 [facility]  [address] [id number] [place] [address] 70229-
1578     

 

 [facility]  [address] [place] [place] [address] 10305-
6481  22 May, 2018  Local infection of the skin and subcutaneous tissue, 
unspecified L08.9 and Other injury of unspecified body region, initial 
encounter T14.8XXA

 

 [facility]  [address] [place] [id number] [place] [address] 06286-
6850  17 May, 2018   

 

 [facility]  [address] [place] [id number] [place] [address] 44650-
1924  10 May, 2018   

 

 [facility]  [address] [place] [id number] [place] [address] 81628-
4368    Hallucinations R44.3

 

 [facility]  [address], [place] [address]-
4694  19 Mar, 2018  Psychosis, unspecified psychosis type F29

 

 [facility]  [address] [place] [id number] [place] [address] 08647-
0378  15 Mar, 2018   

 

 [facility]  [address] [place] [id number] [place] [address] 75544-
7658  13 Mar, 2018   

 

 [facility]  [address] [place] [id number] [place] [address] 41990-
7345    Unspecified dementia without behavioral disturbance F03.90 
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 
condition F28

 

 [facility]  [address] [place] [id number] [place] [address] 07038-
7453     

 

 [facility]  [address] [id number] [place] [address] 07407-
7599     

 

 [facility]  [address] [place] [id number] [place] [address] 14385-
0438     

 

 [facility]  [address] [id number] [place] [address] 51797-
9450    Dementia, unspecified, without behavioral disturbance F03.90

 

 [facility]  [address], [place] [address]-
2500  06 Dec, 2017  Medicare annual wellness visit, initial Z00.00 and 
Encounter for immunization Z23

 

 [facility]  [address] [place] [address] 84346-
4759    Ataxia R27.0 and Hallucinations R44.3

 

 [facility]  [address] [place] [address] 60908-
8738     

 

 [facility]  [address] [id number] [place] [address] 35157-
6094  12 Oct, 2017  Encounter for immunization Z23

 

 [facility]  [address] [place] [address] 27953-
6390  11 Sep, 2017  Hallucinations R44.3

 

 [place] [address] [place] [id number] [place] [address] 81315-
2024  05 Sep, 2017  Hallucinations R44.3

 

 [facility]  [address] [place] [address] [place] [address] 28011-
9620  30 Aug, 2017  Arthralgia of left knee M25.562 ; Age-related nuclear 
cataract of both eyes H25.13 and Hallucinations R44.3

 

 [place] [address] [place] [address] [place] [address] 04528-
4673  24 Aug, 2017   

 

 [place] [address] [place] [address] [place] [address] 80391-
4675  14 Aug, 2017   

 

 [place] [address] [place] [address] [place] [address] 51064-
1594    Hyperlipidemia E78.5 and Hypertension I10

 

 [place] [address] [place] [address] 67635-
7474    Hyperlipidemia E78.5 and Hypertension I10

 

 [place] [address] [place] [address] [place] [address] 79675-
5862     

 

 [place] [address] [place] [address] [place] [address] 58431-
9839  15 Feb, 2017  Hypertension I10 ; Alzheimers disease with late onset G30.1 
; Dementia in other diseases classified elsewhere without behavioral 
disturbance F02.80 and Ataxia R27.0

 

 [place] [address] [place] [address] [place] [address] 01013-
4426  14 2016   

 

 [place] [address] [place] [address] [place] [address] 61902-
1769     

 

 [place] [address] [place] [address] [place] [address] 88075-
8255    Hypertension I10 and Ataxia R27.0

 

 [place] [address] [place] [address] [place] [address] 22008-
7119  14 Oct, 2016   

 

 [place] [address] [place] [address] [place] [address] 97590-
3809  26 Sep, 2016   

 

 [facility]  [address] [place] [address] 52300-
0156  23 Sep, 2016   

 

 [facility]  [address] [id number] [place] [address] 05760-
2473  23 Sep, 2016   

 

 [facility]  [address] [id number] [place] [address] 19264-
3302  20 Sep, 2016   

 

 [facility]  [address] [place] [id number] [place] [address] 35642-
4331  16 Sep, 2016   

 

 [facility]  [id number] [place] [address] 97433
-3905  13 Aug, 2016  Urinary frequency R35.0 and Acute cystitis without 
hematuria N30.00

 

 [facility]  [address] [id number] [place] [address] 76468-
0381     

 

 [facility]  [address] [place] [place] [address] 42064-
1106     

 

 [facility]  [address] [place] [place] [address] 68587-
7306  11 Mar, 2016  Hyperlipidemia E78.5

 

 [place] [address] [place] [address] [place] [address] 47367-
0696  10 Mar, 2016  Ataxia R27.0 ; Hyperlipidemia E78.5 and Hypertension I10

 

 [facility]  [address] [id number] [place] [address] 41651-
4796     

 

 [facility]  [address] [place] [address] 63664-
0671    Ataxia R27.0 ; Senile cataracts of both eyes H25.9 and 
Constipation, unspecified constipation type K59.00

 

 [facility]  [address] [place] [place] [address] 54766-
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

 

 [facility]  [address] [place] [address] 92360-
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

 

 [facility]  [address] [facility] [id number] [address]-
3873  21 Aug, 2015  Dementia 294.20

 

 [facility]  [address] [id number] [address]-
7319  13 Aug, 2015  Establishing care with new doctor, encounter for V65.8 ; 
Ataxia 781.3 ; Horizontal nystagmus 379.56 ; Hypertension 401.9 ; 
Hyperlipidemia 272.4 and History of TIA (transient ischemic attack) V12.54

 

 [place] [address] [facility] [id number] [address]-
3733  06 Aug, 2015   

 

 [place] [address] [facility] [id number] [address]-
9135  05 Aug, 2015   







IMMUNIZATIONS

No Known Immunizations



SOCIAL HISTORY

Never Assessed



REASON FOR VISIT

Order Request



PLAN OF CARE





VITAL SIGNS





MEDICATIONS

Unknown Medications



RESULTS

No Results



PROCEDURES

No Known procedures



INSTRUCTIONS





MEDICATIONS ADMINISTERED

No Known Medications



MEDICAL (GENERAL) HISTORY







 Type  Description  Date

 

 Medical History  coronary artery disease   

 

 Medical History  Carotid stenosis   

 

 Medical History  hypertension   

 

 Medical History  hyperlipidemia   

 

 Medical History  mitral valve regurgitation   

 

 Medical History  vitamin D deficiency   

 

 Medical History  nystagmus   

 

 Medical History  chest pain syndrome/palpitations   

 

 Medical History  ataxia/unsteady gait   

 

 Medical History  TIAs   

 

 Medical History  stress test 2012 EF 81%; 2014 EF 78%   

 

 Medical History  echo 2012 EF 60% mild MR,TR, AV stenosis; 2014 EF 60% 
aortic regurgitatio, MR, TR   

 

 Medical History  carotid duplex 2012 <40% Bilat; 2014 <40% bilat   

 

 Medical History  cataracts   

 

 Surgical History  left cataract surgery  2018

 

 Hospitalization History  falls   

 

 Hospitalization History  left hip pain, age-indeterminate pubic rami fracture--
[facility]  16

## 2018-12-31 NOTE — XMS REPORT
[facility]

 Created on: 2018



LandisAdwoaKely

External Reference #: [id number]

: 1935

Sex: Female



Demographics







 Address  [address] [place]  [address]

 

 Preferred Language  Unknown

 

 Marital Status  Unknown

 

 Confucianism Affiliation  Unknown

 

 Race  Unknown

 

 Ethnic Group  Unknown





Author







 Author  ODIN GAUTHIER

 

 Organization  [facility] [place]

 

 Address  [address] [place]  [address]



 

 Phone  [phone]







Care Team Providers







 Care Team Member Name  Role  Phone

 

 ODIN GAUTHIER  Unavailable  [phone]







PROBLEMS







 Type  Condition  ICD9-CM Code  CDT04-KO Code  Onset Dates  Condition Status  
SNOMED Code

 

 Problem  Hypertension     I10     Active  19365916

 

 Problem  Dementia in other diseases classified elsewhere without behavioral 
disturbance     F02.80     Active  381898122

 

 Problem  Ataxia     R27.0     Active  41199975

 

 Problem  Hyperlipidemia     E78.5     Active  55633114

 

 Problem  Delusional disorder     F22     Active  34923328

 

 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 
condition     F28     Active  15217895

 

 Problem  Psychosis, unspecified psychosis type     F29     Active  59253921

 

 Problem  Alzheimers disease with late onset     G30.1     Active  39764082

 

 Problem  Unspecified dementia without behavioral disturbance     F03.90     
Active  06638671

 

 Problem  Hallucinations     R44.3     Active  9588566







ALLERGIES

No Known Allergies



ENCOUNTERS







 Encounter  Location  Date  Diagnosis

 

 [place] [id number] [place] [phone]-
550[address]  04 Sep, 2018  Hallucinations R44.3

 

 [place] [id number] [place] [address]-
1210  09 Aug, 2018   

 

 [place] [address] [phone]-
[phone]  08 Aug, 2018  Hallucinations R44.3 ; Hypertension I10 and Toe pain, right 
M79.674

 

 [place] [address] [address]-
2783    Hallucinations R44.3 ; Alzheimers disease with late onset 
G30.1 and Toe pain, right M79.674

 

 [place] [id number] [place] [address]-
5100     

 

 [facility] [place]  [id number], [place] [address]
4906    Hallucinations R44.3

 

 [facility] [place]  [id number] [address]-
5817     

 

 [facility] [place]  [id number], [place] [address]-
5579    Delusional disorder F22 and Psychosis, unspecified 
psychosis type F29

 

 [facility] [place]  [id number], [place] [address]-
4[address]     

 

 [facility] [place]  [id number] [place] [address]-
5679     

 

 [facility] [place]  [id number] [place] [address]-
8802  22 May, 2018  Local infection of the skin and subcutaneous tissue, 
unspecified L08.9 and Other injury of unspecified body region, initial 
encounter T14.8XXA

 

 [facility] [place]  [id number], [place] [address]-
1525  17 May, 2018   

 

 [facility] [place]  [address] [place] [address]-
1670  10 May, 2018   

 

 [facility] [place]  [id number], [place] [address]-
4638    Hallucinations R44.3

 

 [facility] [place]  [id number], [place] [address]-
2964  19 Mar, 2018  Psychosis, unspecified psychosis type F29

 

 [facility] [place]  [id number], [place] [address]-
7342  15 Mar, 2018   

 

 [facility] [place]  [id number] [address]-
8794  13 Mar, 2018   

 

 [facility] [place]  [id number] [address]-
2137    Unspecified dementia without behavioral disturbance F03.90 
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 
condition F28

 

 [facility] [place]  [address] [place] [address]-
1703     

 

 [facility] [place]  [id number] [place] [address]-
5829     

 

 [facility] [place]  [id number] [place] [address]-
7028     

 

 [facility] [place]  [address] [address]-
5067    Dementia, unspecified, without behavioral disturbance F03.90

 

 [place] [id number] [place] [address]266-
6242  06 Dec, 2017  Medicare annual wellness visit, initial Z00.00 and 
Encounter for immunization Z23

 

 [place] [id number] [place] [address]-
8403    Ataxia R27.0 and Hallucinations R44.3

 

 [place] [id number] [place] [address]-
2605     

 

 [place] [id number] [place] [address]-
9153  12 Oct, 2017  Encounter for immunization Z23

 

 [place] [id number] [place] [address] 78015-
1018  11 Sep, 2017  Hallucinations R44.3

 

 [place] [id number] [place] [address]-
4947  05 Sep, 2017  Hallucinations R44.3

 

 [place] [id number] [place] [address] 7[address]-
1614  30 Aug, 2017  Arthralgia of left knee M25.562 ; Age-related nuclear 
cataract of both eyes H25.13 and Hallucinations R44.3

 

 [facility] [place]  [id number] [place] [address]-
6210  24 Aug, 2017   

 

 [place] [id number] [place] [address] 22194-
0709  14 Aug, 2017   

 

 [facility] [place]  [address] [place] [address]-
4946    Hyperlipidemia E78.5 and Hypertension I10

 

 [facility] [place]  [id number] [place] [address] 18702-
7855  16 2017  Hyperlipidemia E78.5 and Hypertension I10

 

 [facility] [place]  [id number] [place] [address]-
2079     

 

 [facility] [place]  [id number] [place] [address] 40[address]-
3163  15 2017  Hypertension I10 ; Alzheimers disease with late onset G30.1 
; Dementia in other diseases classified elsewhere without behavioral 
disturbance F02.80 and Ataxia R27.0

 

 [facility] [place]  [id number] [place] [address]-
8012  14 2016   

 

 [facility] [place]  [id number] [place] [address] 89877-
3996  08 2016   

 

 [facility] [place]  [id number] [place] [address] 68281-
6788  07 2016  Hypertension I10 and Ataxia R27.0

 

 [facility] [place]  [id number] [place] [address]238-
6385  14 Oct, 2016   

 

 [facility] [place]  [id number] [place] [address]76-
1667  26 Sep, 2016   

 

 [facility] [place]  [id number] [place] [address]-
6203  23 Sep,    

 

 [facility] [place]  [id number] [place] [address]693-
7101  23 Sep, 2016   

 

 [facility] [place]  [id number] [place] [address]-
2758  20 Sep,    

 

 [facility] [place]  [id number] [place] [address]-
5163  16 Sep, 2016   

 

 [facility]  [address] [place] [address] [phone]  13 Aug, 2016  Urinary frequency R35.0 and Acute cystitis without 
hematuria N30.00

 

 [place] [address] [address]-
3866     

 

 [place] [address] [address]-
2228     

 

 [place] [address] [address]-
6562  11 Mar, 2016  Hyperlipidemia E78.5

 

 [address] [address]-
0705  10 Mar, 2016  Ataxia R27.0 ; Hyperlipidemia E78.5 and Hypertension I10

 

 [address] [address]-
4076     

 

 [address] [address]-
4702    Ataxia R27.0 ; Senile cataracts of both eyes H25.9 and 
Constipation, unspecified constipation type K59.00

 

 [address] [address]-
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

 

 [address] [address]-
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

 

 [place] [address] [address]-
5720  21 Aug, 2015  Dementia 294.20

 

 [address] [address]-
9583  13 Aug, 2015  Establishing care with new doctor, encounter for V65.8 ; 
Ataxia 781.3 ; Horizontal nystagmus 379.56 ; Hypertension 401.9 ; 
Hyperlipidemia 272.4 and History of TIA (transient ischemic attack) V12.54

 

 [address] [address]-
9023  06 Aug, 2015   

 

 [facility]  [address] [facility] [id number] [place] [address]-
4752  05 Aug, 2015   







IMMUNIZATIONS

No Known Immunizations



SOCIAL HISTORY

Never Assessed



REASON FOR VISIT

foot pain  Pt states here for f/u, when asked about feet states she has had one 
toe with gout, but that is getting better  JERRY Bolaños



PLAN OF CARE







 Activity  Details









  









 Follow Up  4 Weeks Reason:med check







VITAL SIGNS







 Height  64 in  2018

 

 Weight  115 lbs  2018

 

 Temperature  98.3 degrees Fahrenheit  2018

 

 Heart Rate  72 bpm  2018

 

 Respiratory Rate  18   2018

 

 BMI  19.74 kg/m2  2018

 

 Blood pressure systolic  122 mmHg  2018

 

 Blood pressure diastolic  64 mmHg  2018







MEDICATIONS







 Medication  Instructions  Dosage  Frequency  Start Date  End Date  Duration  
Status

 

 Fish Oil 1000 MG  Orally Twice a day  1 capsule  12h           Active

 

 Lexapro 10 mg  Orally Once a day  1 tablet  24h       90 days  
Active

 

 Vitamin D 1000 UNIT  Orally Once a day  1 tablet  24h           Active

 

 Mupirocin 2 %  Externally 2 times a day  1 application to affected area  12h  
         Active







RESULTS

No Results



PROCEDURES







 Procedure  Date Ordered  Result  Body Site

 

 [facility] VISIT ESTABLISHED PATIENT  2018      







INSTRUCTIONS





MEDICATIONS ADMINISTERED

No Known Medications



MEDICAL (GENERAL) HISTORY







 Type  Description  Date

 

 Medical History  coronary artery disease   

 

 Medical History  Carotid stenosis   

 

 Medical History  hypertension   

 

 Medical History  hyperlipidemia   

 

 Medical History  mitral valve regurgitation   

 

 Medical History  vitamin D deficiency   

 

 Medical History  nystagmus   

 

 Medical History  chest pain syndrome/palpitations   

 

 Medical History  ataxia/unsteady gait   

 

 Medical History  TIAs   

 

 Medical History  stress test 2012 EF 81%; 2014 EF 78%   

 

 Medical History  echo 2012 EF 60% mild MR,TR, AV stenosis; 2014 EF 60% 
aortic regurgitatio, MR, TR   

 

 Medical History  carotid duplex 2012 <40% Bilat; 2014 <40% bilat   

 

 Medical History  cataracts   

 

 Surgical History  left cataract surgery  2018

 

 Hospitalization History  falls   

 

 Hospitalization History  left hip pain, age-indeterminate pubic rami fracture--
[facility]  16

## 2018-12-31 NOTE — XMS REPORT
[facility]

 Created on: 10/02/2018



Boby Kely

External Reference #: [id number]

: 1935

Sex: Female



Demographics







 Address  [address] [place]  [address]

 

 Preferred Language  Unknown

 

 Marital Status  Unknown

 

 Hoahaoism Affiliation  Unknown

 

 Race  Unknown

 

 Ethnic Group  Unknown





Author







 Author  ODIN GAUTHIER

 

 Organization  [facility]

 

 Address  [address] [place]  [address]



 

 Phone  [phone]







Care Team Providers







 Care Team Member Name  Role  Phone

 

 ODIN GAUTHIER  Unavailable  [phone]







PROBLEMS







 Type  Condition  ICD9-CM Code  ZFO49-PN Code  Onset Dates  Condition Status  
SNOMED Code

 

 Problem  Hypertension     I10     Active  81897696

 

 Problem  Dementia in other diseases classified elsewhere without behavioral 
disturbance     F02.80     Active  131850031

 

 Problem  Ataxia     R27.0     Active  14304431

 

 Problem  Hyperlipidemia     E78.5     Active  10282225

 

 Problem  Delusional disorder     F22     Active  99120164

 

 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 
condition     F28     Active  42247896

 

 Problem  Psychosis, unspecified psychosis type     F29     Active  26315074

 

 Problem  Alzheimers disease with late onset     G30.1     Active  71213522

 

 Problem  Unspecified dementia without behavioral disturbance     F03.90     
Active  78695394

 

 Problem  Hallucinations     R44.3     Active  2850311







ALLERGIES

No Known Allergies



ENCOUNTERS







 Encounter  Location  Date  Diagnosis

 

 [facility]  [id number] [place] [phone]-
[phone]  11 Sep, 2018   

 

 [facility]  [id number] [place] [address] [phone]
[phone]  11 Sep, 2018  Unspecified injury of left ankle, initial encounter 
S99.912A and Unspecified injury of left foot, initial encounter S99.922A

 

 [facility]  [address] [address]
0617  04 Sep, 2018  Hallucinations R44.3

 

 [facility]  [id number] [place] [address] 36534-
8607  09 Aug, 2018   

 

 [facility]  [id number] [place] [address] 46760-
0637  08 Aug, 2018  Hallucinations R44.3 ; Hypertension I10 and Toe pain, right 
M79.674

 

 [facility]  [id number] [place] [address] 17655-
1767    Hallucinations R44.3 ; Alzheimers disease with late onset 
G30.1 and Toe pain, right M79.674

 

 [facility]  [id number] [place] [address] 29742-
7085     

 

 [facility]  [address] [address]-
5506    Hallucinations R44.3

 

 [facility]  [id number] [place] [address] 59780-
3396     

 

 [facility]  [id number] [place] [address] 69507-
3099    Delusional disorder F22 and Psychosis, unspecified 
psychosis type F29

 

 [facility]  [id number] [place] [address] 73434-
3280     

 

 [facility]  [id number] [place] [address] 24750-
0664     

 

 [facility]  [id number] [place] [address] 91366-
[phone]  22 May, 2018  Local infection of the skin and subcutaneous tissue, 
unspecified L08.9 and Other injury of unspecified body region, initial 
encounter T14.8XXA

 

 [facility]  [id number] [place] [address]97-
6540  17 May, 2018   

 

 [facility]  [id number] [place] [address] 56502-
1981  10 May, 2018   

 

 [facility]  [id number] [place] [address] 005[address]-
0763    Hallucinations R44.3

 

 [facility]  [id number] [place] [address] 75524-
4965  19 Mar, 2018  Psychosis, unspecified psychosis type F29

 

 [facility]  [id number] [place] [address] 53699-
6884  15 Mar, 2018   

 

 [facility]  [address] [address]-
7166  13 Mar, 2018   

 

 [facility]  [address] [address]-
6176    Unspecified dementia without behavioral disturbance F03.90 
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 
condition F28

 

 [facility]  [id number] [place] [address] 71035-
1799     

 

 [facility]  [address] [address]-
7698     

 

 [facility]  [address] [place] [address] 71382-
0252     

 

 [facility]  [id number] [place] [address] 02631-
8192    Dementia, unspecified, without behavioral disturbance F03.90

 

 [facility]  [address] [address]-
8803  06 Dec, 2017  Medicare annual wellness visit, initial Z00.00 and 
Encounter for immunization Z23

 

 [address] [place] [address]34-
4086    Ataxia R27.0 and Hallucinations R44.3

 

 [address] [address]-
2905     

 

 [facility]  [id number] [place] [address] 58210-
2725  12 Oct, 2017  Encounter for immunization Z23

 

 [facility]  [address] [place] [address] 25659-
7245  11 Sep, 2017  Hallucinations R44.3

 

 [facility]  [id number] [place] [address] 87483-
5600  05 Sep, 2017  Hallucinations R44.3

 

 [facility]  [id number] [place] [address] 42202-
8813  30 Aug, 2017  Arthralgia of left knee M25.562 ; Age-related nuclear 
cataract of both eyes H25.13 and Hallucinations R44.3

 

 [facility]  [address] [address]-
8306  24 Aug, 2017   

 

 [facility]  [id number] [place] [address] 11097-
1629  14 Aug, 2017   

 

 [facility]  [address] [address]-
0745    Hyperlipidemia E78.5 and Hypertension I10

 

 [address] [address]-
6054    Hyperlipidemia E78.5 and Hypertension I10

 

 [id number] [place] [address] 96025-
9460     

 

 [facility]  [address] [address]-
4244  15 Feb, 2017  Hypertension I10 ; Alzheimers disease with late onset G30.1 
; Dementia in other diseases classified elsewhere without behavioral 
disturbance F02.80 and Ataxia R27.0

 

 [facility]  [id number] [place] [address] 87816-
5943     

 

 [facility]  [id number] [place] [address] 06703-
4182     

 

 [facility]  [address] [address]-
6322    Hypertension I10 and Ataxia R27.0

 

 [facility]  [id number] [place] [address] 28426-
8965  14 Oct, 2016   

 

 [facility]  [address] [address]-
8182  26 Sep, 2016   

 

 [facility]  [id number] [place] [address] 88847-
7600  23 Sep, 2016   

 

 [facility]  [address] [address]-
0639  23 Sep, 2016   

 

 [facility]  [address] [place] [address] 16773-
4872  20 Sep, 2016   

 

 [facility]  [id number] [place] [address] 73556-
7665  16 Sep, 2016   

 

 [facility]  [id number] [place] [address] [phone]
[phone]  13 Aug, 2016  Urinary frequency R35.0 and Acute cystitis without 
hematuria N30.00

 

 [facility]  [id number] [place] [address] 11484-
3033     

 

 [facility]  [id number] [place] [address] 24475-
6136     

 

 [facility]  [id number] [place] [address] 39449-
5734  11 Mar, 2016  Hyperlipidemia E78.5

 

 [id number] [place] [address] 67305-
3786  10 Mar, 2016  Ataxia R27.0 ; Hyperlipidemia E78.5 and Hypertension I10

 

 [facility]  [id number] [place] [address] 92152-
7373     

 

 [facility]  [address] [place] [address] 17158-
6704    Ataxia R27.0 ; Senile cataracts of both eyes H25.9 and 
Constipation, unspecified constipation type K59.00

 

 [facility]  [address] [place] [address] 36939-
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

 

 [id number] [place] [address] 77855-
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

 

 [facility]  [id number] [place] [address] 51359-
6592  21 Aug, 2015  Dementia 294.20

 

 [facility]  [id number] [place] [address]-
4804  13 Aug, 2015  Establishing care with new doctor, encounter for V65.8 ; 
Ataxia 781.3 ; Horizontal nystagmus 379.56 ; Hypertension 401.9 ; 
Hyperlipidemia 272.4 and History of TIA (transient ischemic attack) V12.54

 

 [facility]  [address] [facility] [id number] [address]-
2251  06 Aug, 2015   

 

 [address] [facility] [id number] [address]
8058  05 Aug, 2015   







IMMUNIZATIONS

No Known Immunizations



SOCIAL HISTORY

Never Assessed



REASON FOR VISIT

f/u after starting new medications--ABoJunior



PLAN OF CARE







 Activity  Details









  









 Follow Up  3 Months Reason:hallucinations







VITAL SIGNS







 Height  64 in  2018

 

 Weight  118.2 lbs  2018

 

 Temperature  97.6 degrees Fahrenheit  2018

 

 Heart Rate  100 bpm  2018

 

 Respiratory Rate  18   2018

 

 BMI  20.29 kg/m2  2018

 

 Blood pressure systolic  140 mmHg  2018

 

 Blood pressure diastolic  80 mmHg  2018







MEDICATIONS







 Medication  Instructions  Dosage  Frequency  Start Date  End Date  Duration  
Status

 

 Seroquel XR 50 mg  Orally Once a day  1 tablet at bedtime  24h  08 Aug, 2018  
   30 days  Active

 

 Lexapro 10 mg  Orally Once a day  1 tablet  24h       90 days  
Active

 

 Vitamin D 1000 UNIT  Orally Once a day  1 tablet  24h           Active

 

 Baby Aspirin 81 MG  Orally Once a day     24h           Active

 

 Fish Oil 1000 MG  Orally Twice a day  1 capsule  12h           Active







RESULTS

No Results



PROCEDURES







 Procedure  Date Ordered  Result  Body Site

 

 [facility] VISIT ESTABLISHED PATIENT  2018      







INSTRUCTIONS





MEDICATIONS ADMINISTERED

No Known Medications



MEDICAL (GENERAL) HISTORY







 Type  Description  Date

 

 Medical History  coronary artery disease   

 

 Medical History  Carotid stenosis   

 

 Medical History  hypertension   

 

 Medical History  hyperlipidemia   

 

 Medical History  mitral valve regurgitation   

 

 Medical History  vitamin D deficiency   

 

 Medical History  nystagmus   

 

 Medical History  chest pain syndrome/palpitations   

 

 Medical History  ataxia/unsteady gait   

 

 Medical History  TIAs   

 

 Medical History  stress test 2012 EF 81%; 2014 EF 78%   

 

 Medical History  echo 2012 EF 60% mild MR,TR, AV stenosis; 2014 EF 60% 
aortic regurgitatio, MR, TR   

 

 Medical History  carotid duplex 2012 <40% Bilat; 2014 <40% bilat   

 

 Medical History  cataracts   

 

 Surgical History  left cataract surgery  2018

 

 Hospitalization History  falls   

 

 Hospitalization History  left hip pain, age-indeterminate pubic rami fracture--
[facility]  16

## 2018-12-31 NOTE — XMS REPORT
[facility]

 Created on: 2018



Boby Kely

External Reference #: [id number]

: 1935

Sex: Female



Demographics







 Address  [address] 

[place]  [address]

 

 Preferred Language  Unknown

 

 Marital Status  Unknown

 

 Zoroastrianism Affiliation  Unknown

 

 Race  Unknown

 

 Ethnic Group  Unknown





Author







 Author  ODIN GAUTHIER

 

 Organization  [facility]

 

 Address  [address] [place]  [address]



 

 Phone  [phone]







Care Team Providers







 Care Team Member Name  Role  Phone

 

 ODIN GAUTHIER  Unavailable  [phone]







PROBLEMS







 Type  Condition  ICD9-CM Code  OWR82-PR Code  Onset Dates  Condition Status  
SNOMED Code

 

 Problem  Hypertension     I10     Active  16608572

 

 Problem  Dementia in other diseases classified elsewhere without behavioral 
disturbance     F02.80     Active  155863357

 

 Problem  Ataxia     R27.0     Active  50220309

 

 Problem  Hyperlipidemia     E78.5     Active  72930007

 

 Problem  Delusional disorder     F22     Active  50385448

 

 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 
condition     F28     Active  17634263

 

 Problem  Psychosis, unspecified psychosis type     F29     Active  54138876

 

 Problem  Alzheimers disease with late onset     G30.1     Active  79228076

 

 Problem  Unspecified dementia without behavioral disturbance     F03.90     
Active  54135635

 

 Problem  Hallucinations     R44.3     Active  3805220







ALLERGIES

No Information



ENCOUNTERS







 Encounter  Location  Date  Diagnosis

 

 [facility]  [id number] [place] [address] 25416-
9676  11 Sep, 2018   

 

 [facility]  [id number] [place] [address] 27026
-[phone]  11 Sep, 2018  Unspecified injury of left ankle, initial encounter 
S99.912A and Unspecified injury of left foot, initial encounter S99.922A

 

 [facility]  [address] [place] [address] 10284-
3376  04 Sep, 2018  Hallucinations R44.3

 

 [facility]  [id number] [place] [address] 09847-
4064  09 Aug, 2018   

 

 [facility]  [id number] [place] [address] 69213-
4051  08 Aug, 2018  Hallucinations R44.3 ; Hypertension I10 and Toe pain, right 
M79.674

 

 [facility]  [id number] [place] [address] 43455-
8944    Hallucinations R44.3 ; Alzheimers disease with late onset 
G30.1 and Toe pain, right M79.674

 

 [facility]  [id number] [place] [address] 84687-
1030     

 

 [facility]  [id number] [place] [address] 08468-
1118    Hallucinations R44.3

 

 [facility]  [id number] [place] [address] 00315-
5821     

 

 [facility]  [id number] [place] [address] 03386-
7910    Delusional disorder F22 and Psychosis, unspecified 
psychosis type F29

 

 [facility]  [id number] [place] [address] 13844-
5890     

 

 [facility]  [id number] [place] [address] 21801-
0280     

 

 [facility]  [id number] [place] [address] 63692-
9233  22 May, 2018  Local infection of the skin and subcutaneous tissue, 
unspecified L08.9 and Other injury of unspecified body region, initial 
encounter T14.8XXA

 

 [facility]  [id number] [place] [address] 65615-
1067  17 May, 2018   

 

 [facility]  [id number] [place] [address] 35863-
1255  10 May, 2018   

 

 [facility]  [id number] [place] [address] 14662-
1491    Hallucinations R44.3

 

 [facility]  [id number] [place] [address] 87619-
0361  19 Mar, 2018  Psychosis, unspecified psychosis type F29

 

 [facility]  [id number] [place] [address] 24811-
6323  15 Mar, 2018   

 

 [facility]  [id number] [address]-
5241  13 Mar, 2018   

 

 [facility]  [id number] [place] [address] 26864-
5479    Unspecified dementia without behavioral disturbance F03.90 
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 
condition F28

 

 [facility]  [id number] [place] [address] 10999-
4923     

 

 [facility]  [address] [place] [address] 87191-
4417     

 

 [facility]  [id number] [place] [address] 53370-
9089     

 

 [facility]  [id number] [place] [address] 50934-
0788    Dementia, unspecified, without behavioral disturbance F03.90

 

 [facility]  [id number] [address]-
4321  06 Dec, 2017  Medicare annual wellness visit, initial Z00.00 and 
Encounter for immunization Z23

 

 [address] [place] [id number] [place] [address] 11290-
5165    Ataxia R27.0 and Hallucinations R44.3

 

 [place] [id number] [place] [address] 13197-
9042     

 

 [facility]  [id number] [place] [address] 06125-
9951  12 Oct, 2017  Encounter for immunization Z23

 

 [facility]  [id number] [place] [address] 51904-
1702  11 Sep, 2017  Hallucinations R44.3

 

 [facility]  [id number] [place] [address] 69190-
0192  05 Sep, 2017  Hallucinations R44.3

 

 [facility]  [id number] [place] [address] 95061-
5446  30 Aug, 2017  Arthralgia of left knee M25.562 ; Age-related nuclear 
cataract of both eyes H25.13 and Hallucinations R44.3

 

 [facility]  [address] [address]-
1621  24 Aug, 2017   

 

 [facility]  [address] 8[address]17-
0762  14 Aug, 2017   

 

 [facility]  [address] [address]-
5737    Hyperlipidemia E78.5 and Hypertension I10

 

 [address] [place] [address] 7[address]-
7883    Hyperlipidemia E78.5 and Hypertension I10

 

 [facility]  [address] [address]18-
0770     

 

 [facility]  [address] [address]-
4881  15 Feb, 2017  Hypertension I10 ; Alzheimers disease with late onset G30.1 
; Dementia in other diseases classified elsewhere without behavioral 
disturbance F02.80 and Ataxia R27.0

 

 [facility]  [address] 48562-
4773     

 

 [facility]  [id number] [place] [address] 61190-
2440     

 

 [facility]  [address] 94670-
7231    Hypertension I10 and Ataxia R27.0

 

 [facility]  [id number] [place] [address] 08802-
8520  14 Oct, 2016   

 

 [facility]  [address] 47382-
6191  26 Sep, 2016   

 

 [facility]  [address] 87417-
8933  23 Sep, 2016   

 

 [facility]  [address] 82948-
4264  23 Sep, 2016   

 

 [facility]  [address] [address]-
3620  20 Sep, 2016   

 

 [facility]  [id number] [place] [address] 91338-
9840  16 Sep, 2016   

 

 [facility]  [id number] [place] [address] 95661
-[phone]  13 Aug, 2016  Urinary frequency R35.0 and Acute cystitis without 
hematuria N30.00

 

 [facility]  [id number] [place] [address] 50388-
1277     

 

 [facility]  [id number] [place] [address] 25938-
6883     

 

 [facility]  [id number] [place] [address] 47574-
2171  11 Mar, 2016  Hyperlipidemia E78.5

 

 [address] [place] [id number] [place] [address] 45112-
6920  10 Mar, 2016  Ataxia R27.0 ; Hyperlipidemia E78.5 and Hypertension I10

 

 [facility]  [id number] [place] [address] 40932-
7647     

 

 [facility]  [id number] [place] [address] 44974-
8978    Ataxia R27.0 ; Senile cataracts of both eyes H25.9 and 
Constipation, unspecified constipation type K59.00

 

 [facility]  [address] [place] [address] 85452-
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

 

 [facility]  [id number] [place] [address] 40586-
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

 

 [facility]  [id number] [place] [address] 85636-
0780  21 Aug, 2015  Dementia 294.20

 

 [facility]  [address] [place] [address]-
6773  13 Aug, 2015  Establishing care with new doctor, encounter for V65.8 ; 
Ataxia 781.3 ; Horizontal nystagmus 379.56 ; Hypertension 401.9 ; 
Hyperlipidemia 272.4 and History of TIA (transient ischemic attack) V12.54

 

 [facility]  [address] [facility] [id number] [address]-
4474  06 Aug, 2015   

 

 [facility]  [address] [facility] [id number] [address]-
2255  05 Aug, 2015   







IMMUNIZATIONS

No Known Immunizations



SOCIAL HISTORY

Never Assessed



REASON FOR VISIT

Requests return call



PLAN OF CARE





VITAL SIGNS





MEDICATIONS

Unknown Medications



RESULTS

No Results



PROCEDURES

No Known procedures



INSTRUCTIONS





MEDICATIONS ADMINISTERED

No Known Medications



MEDICAL (GENERAL) HISTORY







 Type  Description  Date

 

 Medical History  coronary artery disease   

 

 Medical History  Carotid stenosis   

 

 Medical History  hypertension   

 

 Medical History  hyperlipidemia   

 

 Medical History  mitral valve regurgitation   

 

 Medical History  vitamin D deficiency   

 

 Medical History  nystagmus   

 

 Medical History  chest pain syndrome/palpitations   

 

 Medical History  ataxia/unsteady gait   

 

 Medical History  TIAs   

 

 Medical History  stress test 2012 EF 81%; 2014 EF 78%   

 

 Medical History  echo 2012 EF 60% mild MR,TR, AV stenosis; 2014 EF 60% 
aortic regurgitatio, MR, TR   

 

 Medical History  carotid duplex 2012 <40% Bilat; 2014 <40% bilat   

 

 Medical History  cataracts   

 

 Surgical History  left cataract surgery  2018

 

 Hospitalization History  falls   

 

 Hospitalization History  left hip pain, age-indeterminate pubic rami fracture--
[facility]  16

## 2018-12-31 NOTE — XMS REPORT
[facility]

 Created on: 2018



Landis Kely

External Reference #: [id number]

: 1935

Sex: Female



Demographics







 Address  [address] [place]  [address]

 

 Preferred Language  Unknown

 

 Marital Status  Unknown

 

 Restoration Affiliation  Unknown

 

 Race  Unknown

 

 Ethnic Group  Unknown





Author







 Author  ODIN GAUTHIER

 

 Organization  [facility]

 

 Address  [address] [place]  [address]



 

 Phone  [phone]







Care Team Providers







 Care Team Member Name  Role  Phone

 

 ODIN GAUTHIER  Unavailable  [phone]







PROBLEMS







 Type  Condition  ICD9-CM Code  VKY66-RB Code  Onset Dates  Condition Status  
SNOMED Code

 

 Problem  Hypertension     I10     Active  38918466

 

 Problem  Dementia in other diseases classified elsewhere without behavioral 
disturbance     F02.80     Active  950294132

 

 Problem  Ataxia     R27.0     Active  65084452

 

 Problem  Hyperlipidemia     E78.5     Active  49252597

 

 Problem  Delusional disorder     F22     Active  82341390

 

 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 
condition     F28     Active  21432746

 

 Problem  Psychosis, unspecified psychosis type     F29     Active  25259397

 

 Problem  Alzheimers disease with late onset     G30.1     Active  98445382

 

 Problem  Unspecified dementia without behavioral disturbance     F03.90     
Active  92969482

 

 Problem  Hallucinations     R44.3     Active  7486523







ALLERGIES

No Information



ENCOUNTERS







 Encounter  Location  Date  Diagnosis

 

 [address] [address]-
0132  04 Sep, 2018   

 

 [address] [address]-
6606  09 Aug, 2018   

 

 [address] [address]-
8726  08 Aug, 2018  Hallucinations R44.3 ; Hypertension I10 and Toe pain, right 
M79.674

 

 [address] [address]-
8610    Hallucinations R44.3 ; Alzheimers disease with late onset 
G30.1 and Toe pain, right M79.674

 

 [id number] [place] [address] 31661-
2356     

 

 [address], [place] [address]-
0439  19 2018  Hallucinations R44.3

 

 [facility]  [id number] [place] [address] 24979-
9385  14 2018   

 

 [facility]  [id number] [place] [address] 16491-
9855  13 2018  Delusional disorder F22 and Psychosis, unspecified 
psychosis type F29

 

 [facility]  [id number] [place] [address] 43216-
8978     

 

 [facility]  [id number] [place] [address] 79725-
6899     

 

 [facility]  [id number] [place] [address] 71661-
1800  22 May, 2018  Local infection of the skin and subcutaneous tissue, 
unspecified L08.9 and Other injury of unspecified body region, initial 
encounter T14.8XXA

 

 [facility]  [id number] [address]-
9715  17 May, 2018   

 

 [facility]  [id number] [place] [address] 20076-
7738  10 May, 2018   

 

 [facility]  [id number] [place] [address] 63884-
5994    Hallucinations R44.3

 

 [facility]  [address] [place] [address] 97888-
9475  19 Mar, 2018  Psychosis, unspecified psychosis type F29

 

 [facility]  [id number] [address]-
6974  15 Mar, 2018   

 

 [facility]  [id number] [place] [address] 82148-
8585  13 Mar, 2018   

 

 [facility]  [address], [place] [address]-
1191    Unspecified dementia without behavioral disturbance F03.90 
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 
condition F28

 

 [facility]  [id number] [place] [address] 10985-
6999     

 

 [facility]  [address] [address]645-
5280     

 

 [facility]  [id number] [place] [address] 68425-
6472     

 

 [facility]  [address] [address]-
6840    Dementia, unspecified, without behavioral disturbance F03.90

 

 [id number] [place] [address] 21682-
2111  06 Dec, 2017  Encounter for immunization Z23 and Medicare annual wellness 
visit, initial Z00.00

 

 [id number] [place] [address] 88952-
8116    Ataxia R27.0 and Hallucinations R44.3

 

 [id number] [place] [address] 52351-
4070     

 

 [id number] [place] [address] 32348-
0201  12 Oct, 2017  Encounter for immunization Z23

 

 [id number] [place] [address] 72643-
3455  11 Sep, 2017  Hallucinations R44.3

 

 [id number] [place] [address] 83424-
7245  05 Sep, 2017  Hallucinations R44.3

 

 [id number] [place] [address] 97897-
3044  30 Aug, 2017  Arthralgia of left knee M25.562 ; Age-related nuclear 
cataract of both eyes H25.13 and Hallucinations R44.3

 

 [facility]  [id number] [place] [address] 02176-
5818  24 Aug, 2017   

 

 [id number] [place] [address] 36729-
7364  14 Aug, 2017   

 

 [facility]  [id number] [place] [address] 86337-
7801    Hyperlipidemia E78.5 and Hypertension I10

 

 [facility]  [id number] [place] [address] 93333-
0685    Hyperlipidemia E78.5 and Hypertension I10

 

 [facility]  [id number] [place] [address] 27479-
3159     

 

 [facility]  [id number] [place] [address] 45773-
2830  15 Feb, 2017  Hypertension I10 ; Alzheimers disease with late onset G30.1 
; Dementia in other diseases classified elsewhere without behavioral 
disturbance F02.80 and Ataxia R27.0

 

 [facility]  [id number] [place] [address] 39392-
5396     

 

 [facility]  [id number] [place] [address] 41485-
6156     

 

 [facility]  [id number] [place] [address] 89637-
8240    Hypertension I10 and Ataxia R27.0

 

 [facility]  [id number] [place] [address] 57407-
2072  14 Oct, 2016   

 

 [facility]  [id number] [place] [address] 72709-
5354  26 Sep, 2016   

 

 [facility]  [id number] [place] [address] 04994-
2055  23 Sep, 2016   

 

 [facility]  [id number] [place] [address] 52536-
1328  23 Sep, 2016   

 

 [facility]  [id number] [place] [address] 39718-
6104  20 Sep,    

 

 [facility]  [id number] [place] [address] 35122-
9663  16 Sep, 2016   

 

 [facility]  [id number] [place] [address] 64805
-[phone]  13 Aug, 2016  Urinary frequency R35.0 and Acute cystitis without 
hematuria N30.00

 

 [address] [place] [address] 73777-
5892     

 

 [facility]  [address] [address]383-
9341     

 

 [address] [address] 52056-
7082  11 Mar, 2016  Hyperlipidemia E78.5

 

 [address] 94329-
9768  10 Mar, 2016  Ataxia R27.0 ; Hyperlipidemia E78.5 and Hypertension I10

 

 [address] [place] [address] 9[address]15-
1196     

 

 [address] 74245-
9254    Ataxia R27.0 ; Senile cataracts of both eyes H25.9 and 
Constipation, unspecified constipation type K59.00

 

 [address] 08143-
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

 

 [address] 18662-
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

 

 [address] [place] [address] 12729-
8732  21 Aug, 2015  Dementia 294.20

 

 [address] [place] [address] 54892-
7826  13 Aug, 2015  Establishing care with new doctor, encounter for V65.8 ; 
Ataxia 781.3 ; Horizontal nystagmus 379.56 ; Hypertension 401.9 ; 
Hyperlipidemia 272.4 and History of TIA (transient ischemic attack) V12.54

 

 [place] [address] [address]191-
7451  06 Aug, 2015   

 

 [facility]  [address] [facility] [id number] [place] [address]-
7879  05 Aug, 2015   







IMMUNIZATIONS

No Known Immunizations



SOCIAL HISTORY

Never Assessed



REASON FOR VISIT

Medication Side Effects



PLAN OF CARE





VITAL SIGNS





MEDICATIONS







 Medication  Instructions  Dosage  Frequency  Start Date  End Date  Duration  
Status

 

 Lexapro 10 mg  Orally Once a day  1 tablet  24h       90 days  
Active







RESULTS

No Results



PROCEDURES

No Known procedures



INSTRUCTIONS





MEDICATIONS ADMINISTERED

No Known Medications



MEDICAL (GENERAL) HISTORY







 Type  Description  Date

 

 Medical History  coronary artery disease   

 

 Medical History  Carotid stenosis   

 

 Medical History  hypertension   

 

 Medical History  hyperlipidemia   

 

 Medical History  mitral valve regurgitation   

 

 Medical History  vitamin D deficiency   

 

 Medical History  nystagmus   

 

 Medical History  chest pain syndrome/palpitations   

 

 Medical History  ataxia/unsteady gait   

 

 Medical History  TIAs   

 

 Medical History  stress test 2012 EF 81%; 2014 EF 78%   

 

 Medical History  echo 2012 EF 60% mild MR,TR, AV stenosis; 2014 EF 60% 
aortic regurgitatio, MR, TR   

 

 Medical History  carotid duplex 2012 <40% Bilat; 2014 <40% bilat   

 

 Medical History  cataracts   

 

 Surgical History  left cataract surgery  2018

 

 Hospitalization History  falls   

 

 Hospitalization History  left hip pain, age-indeterminate pubic rami fracture--
[facility]  16

## 2018-12-31 NOTE — XMS REPORT
[facility]

 Created on: 2018



Landis Kely

External Reference #: [id number]

: 1935

Sex: Female



Demographics







 Address  [address] [place]  [address]

 

 Preferred Language  Unknown

 

 Marital Status  Unknown

 

 Adventist Affiliation  Unknown

 

 Race  Unknown

 

 Ethnic Group  Unknown





Author







 Author  ODIN GAUTHIER

 

 Organization  [facility]

 

 Address  [address] [place]  [address]



 

 Phone  [phone]







Care Team Providers







 Care Team Member Name  Role  Phone

 

 ODIN GAUTHIER  Unavailable  [phone]







PROBLEMS







 Type  Condition  ICD9-CM Code  RUE40-PH Code  Onset Dates  Condition Status  
SNOMED Code

 

 Problem  Hypertension     I10     Active  11343867

 

 Problem  Dementia in other diseases classified elsewhere without behavioral 
disturbance     F02.80     Active  670580984

 

 Problem  Ataxia     R27.0     Active  31813674

 

 Problem  Hyperlipidemia     E78.5     Active  53644779

 

 Problem  Delusional disorder     F22     Active  19005119

 

 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 
condition     F28     Active  37855139

 

 Problem  Psychosis, unspecified psychosis type     F29     Active  90868219

 

 Problem  Alzheimers disease with late onset     G30.1     Active  74803363

 

 Problem  Unspecified dementia without behavioral disturbance     F03.90     
Active  50917037

 

 Problem  Hallucinations     R44.3     Active  0132052







ALLERGIES

No Information



ENCOUNTERS







 Encounter  Location  Date  Diagnosis

 

 [place] [address] [address]-
7909  04 Sep, 2018   

 

 [place] [address] [address]-
0449  09 Aug, 2018   

 

 [place] [address] [address]-
4986  08 Aug, 2018  Hallucinations R44.3 ; Hypertension I10 and Toe pain, right 
M79.674

 

 [place] [address] [address]-
9154    Hallucinations R44.3 ; Alzheimers disease with late onset 
G30.1 and Toe pain, right M79.674

 

 [place] [address] [place] [id number] [place] [address] 32095-
9208     

 

 [place] [address], [place] [address]
2457  19 2018  Hallucinations R44.3

 

 [facility]  [address] [place] [id number] [place] [address] 22564-
3937  14 2018   

 

 [facility]  [address] [place] [id number] [place] [address] 37978-
3478  13 2018  Delusional disorder F22 and Psychosis, unspecified 
psychosis type F29

 

 [facility]  [address] [place] [id number] [place] [address] 89654-
1688     

 

 [facility]  [address] [place] [id number] [place] [address] 48862-
9861     

 

 [facility]  [address] [place] [id number] [place] [address] 25729-
6831  22 May, 2018  Local infection of the skin and subcutaneous tissue, 
unspecified L08.9 and Other injury of unspecified body region, initial 
encounter T14.8XXA

 

 [facility]  [address] [place] [id number] [address]  17 May, 2018   

 

 [facility]  [address] [place] [id number] [place] [address] 10401-
2586  10 May, 2018   

 

 [facility]  [address] [place] [id number] [place] [address] 33632-
1133    Hallucinations R44.3

 

 [facility]  [address] [place] [address] 21387-
3529  19 Mar, 2018  Psychosis, unspecified psychosis type F29

 

 [facility]  [address] [address]-
3475  15 Mar, 2018   

 

 [facility]  [address] [place] [id number] [place] [address] 37550-
8684  13 Mar, 2018   

 

 [facility]  [address], [place] [address]-
2363    Unspecified dementia without behavioral disturbance F03.90 
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 
condition F28

 

 [facility]  [address] [place] [id number] [place] [address] 93562-
0258     

 

 [facility]  [address] 81217-
9506     

 

 [facility]  [address] [place] [id number] [place] [address] 03601-
3294     

 

 [facility]  [address] 6[address]92-
6049    Dementia, unspecified, without behavioral disturbance F03.90

 

 [place] [address] [place] [id number] [place] [address] 28607-
4820  06 Dec, 2017  Encounter for immunization Z23 and Medicare annual wellness 
visit, initial Z00.00

 

 [place] [address] [place] [id number] [place] [address] 23907-
6261    Ataxia R27.0 and Hallucinations R44.3

 

 [place] [address] [place] [id number] [place] [address] 65074-
0468     

 

 [place] [address] [place] [id number] [place] [address] 14540-
6690  12 Oct, 2017  Encounter for immunization Z23

 

 [place] [address] [place] [id number] [place] [address] 69071-
5378  11 Sep, 2017  Hallucinations R44.3

 

 [place] [address] [place] [id number] [place] [address] 23718-
2325  05 Sep, 2017  Hallucinations R44.3

 

 [place] [address] [place] [id number] [place] [address] 86372-
4607  30 Aug, 2017  Arthralgia of left knee M25.562 ; Age-related nuclear 
cataract of both eyes H25.13 and Hallucinations R44.3

 

 [facility]  [address] [place] [id number] [place] [address] 61000-
4393  24 Aug, 2017   

 

 [place] [address] [place] [id number] [place] [address] 26792-
4176  14 Aug, 2017   

 

 [facility]  [address] [place] [id number] [place] [address] 65481-
5377    Hyperlipidemia E78.5 and Hypertension I10

 

 [facility]  [address] [place] [id number] [place] [address] 66563-
5586    Hyperlipidemia E78.5 and Hypertension I10

 

 [facility]  [address] [place] [id number] [place] [address] 88003-
5648     

 

 [facility]  [address] [place] [id number] [place] [address] 67495-
1335  15 Feb, 2017  Hypertension I10 ; Alzheimers disease with late onset G30.1 
; Dementia in other diseases classified elsewhere without behavioral 
disturbance F02.80 and Ataxia R27.0

 

 [facility]  [address] [place] [id number] [place] [address] 58738-
8743     

 

 [facility]  [address] [place] [id number] [place] [address] 12342-
3524     

 

 [facility]  [address] [place] [id number] [place] [address] 59313-
1607    Hypertension I10 and Ataxia R27.0

 

 [facility]  [address] [place] [id number] [place] [address] 62053-
7894  14 Oct, 2016   

 

 [facility]  [address] [place] [id number] [place] [address] 30722-
8012  26 Sep, 2016   

 

 [facility]  [address] [place] [id number] [place] [address] 25764-
9463  23 Sep, 2016   

 

 [facility]  [address] [place] [id number] [place] [address] 28384-
9512  23 Sep, 2016   

 

 [facility]  [address] [place] [id number] [place] [address] 99493-
2964  20 Sep,    

 

 [facility]  [address] [place] [id number] [place] [address] 71243-
3104  16 Sep, 2016   

 

 [facility]  [address] [place] [id number] [place] [address] 10009
-[phone]  13 Aug, 2016  Urinary frequency R35.0 and Acute cystitis without 
hematuria N30.00

 

 [place] [address] 15453-
7793     

 

 [facility]  [address] 7[address]25-
2794     

 

 [place] [address] 68147-
5351  11 Mar, 2016  Hyperlipidemia E78.5

 

 [place] [address] 46483-
3358  10 Mar, 2016  Ataxia R27.0 ; Hyperlipidemia E78.5 and Hypertension I10

 

 [place] [address] 00207-
1073     

 

 [place] [address] [address]-
0284    Ataxia R27.0 ; Senile cataracts of both eyes H25.9 and 
Constipation, unspecified constipation type K59.00

 

 [place] [address] 61899-
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

 

 [place] [address] 07566-
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

 

 [place] [address] 56697-
8989  21 Aug, 2015  Dementia 294.20

 

 [place] [address] [address]-
6565  13 Aug, 2015  Establishing care with new doctor, encounter for V65.8 ; 
Ataxia 781.3 ; Horizontal nystagmus 379.56 ; Hypertension 401.9 ; 
Hyperlipidemia 272.4 and History of TIA (transient ischemic attack) V12.54

 

 [place] [address] 16744-
9128  06 Aug, 2015   

 

 [facility]  [address] [facility] [id number] [place] [address]-
8912  05 Aug, 2015   







IMMUNIZATIONS

No Known Immunizations



SOCIAL HISTORY

Never Assessed



REASON FOR VISIT

Requests return call



PLAN OF CARE





VITAL SIGNS





MEDICATIONS

Unknown Medications



RESULTS

No Results



PROCEDURES

No Known procedures



INSTRUCTIONS





MEDICATIONS ADMINISTERED

No Known Medications



MEDICAL (GENERAL) HISTORY







 Type  Description  Date

 

 Medical History  coronary artery disease   

 

 Medical History  Carotid stenosis   

 

 Medical History  hypertension   

 

 Medical History  hyperlipidemia   

 

 Medical History  mitral valve regurgitation   

 

 Medical History  vitamin D deficiency   

 

 Medical History  nystagmus   

 

 Medical History  chest pain syndrome/palpitations   

 

 Medical History  ataxia/unsteady gait   

 

 Medical History  TIAs   

 

 Medical History  stress test 2012 EF 81%; 2014 EF 78%   

 

 Medical History  echo 2012 EF 60% mild MR,TR, AV stenosis; 2014 EF 60% 
aortic regurgitatio, MR, TR   

 

 Medical History  carotid duplex 2012 <40% Bilat; 2014 <40% bilat   

 

 Medical History  cataracts   

 

 Surgical History  left cataract surgery  2018

 

 Hospitalization History  falls   

 

 Hospitalization History  left hip pain, age-indeterminate pubic rami fracture--
[facility]  16

## 2018-12-31 NOTE — DIAGNOSTIC IMAGING REPORT
PROCEDURE: CT head without contrast.



TECHNIQUE: Multiple contiguous axial images were obtained through

the brain without the use of intravenous contrast.



DATE: December 31, 2018.



COMPARISON: CT head and cervical spine December 12, 2018. MRI

brain May 11, 2015.



INDICATION: 83-year-old female, confusion and altered mental

status.



FINDINGS: 



There are motion limitations of the exam. The visualized portions

of the paranasal sinuses, mastoid air cells and middle ears

appear well aerated. There are areas of low attenuation in the

periventricular and subcortical white matter which are

nonspecific but most likely reflect mild/moderate changes of

chronic small vessel ischemic disease.  The ventricles and

cerebral spinal fluid spaces are of normal size and configuration

for the patient's age. There is no mass effect or midline shift.

There is no acute intracranial hemorrhage. There is no abnormal

extra-axial fluid collection.     



IMPRESSION: 

1. No identified acute intracranial abnormality.

2. Mild to moderate changes of chronic small vessel ischemic

disease. 



Dictated by: 



  Dictated on workstation # [id number]

## 2018-12-31 NOTE — XMS REPORT
[facility]

 Created on: 2018



Landis Kely

External Reference #: [id number]

: 1935

Sex: Female



Demographics







 Address  [address] [place]  [address]

 

 Preferred Language  Unknown

 

 Marital Status  Unknown

 

 Restoration Affiliation  Unknown

 

 Race  Unknown

 

 Ethnic Group  Unknown





Author







 Author  ODIN GAUTHIER

 

 Organization  [facility]

 

 Address  [address] [place]  [address]



 

 Phone  [phone]







Care Team Providers







 Care Team Member Name  Role  Phone

 

 ODIN GAUTHIER  Unavailable  [phone]







PROBLEMS







 Type  Condition  ICD9-CM Code  YSZ00-HY Code  Onset Dates  Condition Status  
SNOMED Code

 

 Problem  Hypertension     I10     Active  27496241

 

 Problem  Dementia in other diseases classified elsewhere without behavioral 
disturbance     F02.80     Active  209795557

 

 Problem  Ataxia     R27.0     Active  12103369

 

 Problem  Hyperlipidemia     E78.5     Active  35304049

 

 Problem  Delusional disorder     F22     Active  58610571

 

 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 
condition     F28     Active  26816199

 

 Problem  Psychosis, unspecified psychosis type     F29     Active  69503483

 

 Problem  Alzheimers disease with late onset     G30.1     Active  28639843

 

 Problem  Unspecified dementia without behavioral disturbance     F03.90     
Active  30375760

 

 Problem  Hallucinations     R44.3     Active  8551571







ALLERGIES

No Information



ENCOUNTERS







 Encounter  Location  Date  Diagnosis

 

 [place] [address] [address]-
2227  04 Sep, 2018   

 

 [place] [address] 0[address]71-
6664  09 Aug, 2018   

 

 [place] [address] 81995-
4605  08 Aug, 2018  Hallucinations R44.3 ; Hypertension I10 and Toe pain, right 
M79.674

 

 [place] [address] [address]995-
1181    Hallucinations R44.3 ; Alzheimers disease with late onset 
G30.1 and Toe pain, right M79.674

 

 [place] [address] [place] [id number] [place] [address] 88944-
5688     

 

 [place] [address], [place] [address]-
5192  19 2018  Hallucinations R44.3

 

 [facility]  [address] [place] [id number] [place] [address] 93752-
6565  14 2018   

 

 [facility]  [address] [place] [id number] [place] [address] 21221-
9427  13 2018  Delusional disorder F22 and Psychosis, unspecified 
psychosis type F29

 

 [facility]  [address] [place] [id number] [place] [address] 39735-
6298     

 

 [facility]  [address] [place] [id number] [place] [address] 15537-
0833     

 

 [facility]  [address] [place] [id number] [place] [address] 74729-
0534  22 May, 2018  Local infection of the skin and subcutaneous tissue, 
unspecified L08.9 and Other injury of unspecified body region, initial 
encounter T14.8XXA

 

 [facility]  [address] [place] [id number] [address]-
4894  17 May, 2018   

 

 [facility]  [address] [place] [id number] [place] [address] 47494-
6423  10 May, 2018   

 

 [facility]  [address] [place] [id number] [place] [address] 16280-
2747    Hallucinations R44.3

 

 [facility]  [address] [place] [address] 75859-
3009  19 Mar, 2018  Psychosis, unspecified psychosis type F29

 

 [facility]  [address], [place] [address]-
9138  15 Mar, 2018   

 

 [facility]  [address] [place] [id number] [place] [address] 80587-
1530  13 Mar, 2018   

 

 [facility]  [address], [place] [address]-
8760    Unspecified dementia without behavioral disturbance F03.90 
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 
condition F28

 

 [facility]  [address] [place] [id number] [place] [address] 73047-
8903     

 

 [facility]  [address] 44990-
4038     

 

 [facility]  [address] [place] [id number] [place] [address] 43745-
3124     

 

 [facility]  [address] 04977-
8088    Dementia, unspecified, without behavioral disturbance F03.90

 

 [place] [address] [place] [id number] [place] [address] 09881-
1871  06 Dec, 2017  Encounter for immunization Z23 and Medicare annual wellness 
visit, initial Z00.00

 

 [place] [address] [place] [id number] [place] [address] 12200-
0655    Ataxia R27.0 and Hallucinations R44.3

 

 [place] [address] [place] [id number] [place] [address] 25539-
1659     

 

 [place] [address] [place] [id number] [place] [address] 57577-
7105  12 Oct, 2017  Encounter for immunization Z23

 

 [place] [address] [place] [id number] [place] [address] 66280-
8389  11 Sep, 2017  Hallucinations R44.3

 

 [place] [address] [place] [id number] [place] [address] 47416-
3171  05 Sep, 2017  Hallucinations R44.3

 

 [place] [address] [place] [id number] [place] [address] 13096-
5376  30 Aug, 2017  Arthralgia of left knee M25.562 ; Age-related nuclear 
cataract of both eyes H25.13 and Hallucinations R44.3

 

 [facility]  [address] [place] [id number] [place] [address] 85147-
4405  24 Aug, 2017   

 

 [place] [address] [place] [id number] [place] [address] 75273-
5354  14 Aug, 2017   

 

 [facility]  [address] [place] [id number] [place] [address] 51590-
8948    Hyperlipidemia E78.5 and Hypertension I10

 

 [facility]  [address] [place] [id number] [place] [address] 53706-
7017    Hyperlipidemia E78.5 and Hypertension I10

 

 [facility]  [address] [place] [id number] [place] [address] 74265-
5353     

 

 [facility]  [address] [place] [id number] [place] [address] 85795-
0826  15 Feb, 2017  Hypertension I10 ; Alzheimers disease with late onset G30.1 
; Dementia in other diseases classified elsewhere without behavioral 
disturbance F02.80 and Ataxia R27.0

 

 [facility]  [address] [place] [id number] [place] [address] 64979-
6654     

 

 [facility]  [address] [place] [id number] [place] [address] 27997-
3180     

 

 [facility]  [address] [place] [id number] [place] [address] 03989-
1663    Hypertension I10 and Ataxia R27.0

 

 [facility]  [address] [place] [id number] [place] [address] 80688-
0137  14 Oct, 2016   

 

 [facility]  [address] [place] [id number] [place] [address] 69280-
4640  26 Sep, 2016   

 

 [facility]  [address] [place] [id number] [place] [address] 38688-
1326  23 Sep, 2016   

 

 [facility]  [address] [place] [id number] [place] [address] 00137-
5728  23 Sep, 2016   

 

 [facility]  [address] [place] [id number] [place] [address] 23169-
0883  20 Sep,    

 

 [facility]  [address] [place] [id number] [place] [address] 19244-
1065  16 Sep, 2016   

 

 [facility]  [address] [place] [id number] [place] [address] 61316
-9806  13 Aug, 2016  Urinary frequency R35.0 and Acute cystitis without 
hematuria N30.00

 

 [place] [address] 92643-
0071     

 

 [facility]  [address] 23130-
7278     

 

 [place] [address] 70931-
9287  11 Mar, 2016  Hyperlipidemia E78.5

 

 [place] [address] 46742-
1215  10 Mar, 2016  Ataxia R27.0 ; Hyperlipidemia E78.5 and Hypertension I10

 

 [place] [address] 39350-
7735     

 

 [place] [address] 49406-
1927    Ataxia R27.0 ; Senile cataracts of both eyes H25.9 and 
Constipation, unspecified constipation type K59.00

 

 [place] [address] 07920-
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

 

 [place] [address] 47992-
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

 

 [place] [address] 84025-
3354  21 Aug, 2015  Dementia 294.20

 

 [place] [address] 84410-
8656  13 Aug, 2015  Establishing care with new doctor, encounter for V65.8 ; 
Ataxia 781.3 ; Horizontal nystagmus 379.56 ; Hypertension 401.9 ; 
Hyperlipidemia 272.4 and History of TIA (transient ischemic attack) V12.54

 

 [place] [address] [address]94-
4529  06 Aug, 2015   

 

 [facility]  [address] [facility] [id number] [place] [address]-
9849  05 Aug, 2015   







IMMUNIZATIONS

No Known Immunizations



SOCIAL HISTORY

Never Assessed



REASON FOR VISIT

Requests return call



PLAN OF CARE





VITAL SIGNS





MEDICATIONS

Unknown Medications



RESULTS

No Results



PROCEDURES

No Known procedures



INSTRUCTIONS





MEDICATIONS ADMINISTERED

No Known Medications



MEDICAL (GENERAL) HISTORY







 Type  Description  Date

 

 Medical History  coronary artery disease   

 

 Medical History  Carotid stenosis   

 

 Medical History  hypertension   

 

 Medical History  hyperlipidemia   

 

 Medical History  mitral valve regurgitation   

 

 Medical History  vitamin D deficiency   

 

 Medical History  nystagmus   

 

 Medical History  chest pain syndrome/palpitations   

 

 Medical History  ataxia/unsteady gait   

 

 Medical History  TIAs   

 

 Medical History  stress test 2012 EF 81%; 2014 EF 78%   

 

 Medical History  echo 2012 EF 60% mild MR,TR, AV stenosis; 2014 EF 60% 
aortic regurgitatio, MR, TR   

 

 Medical History  carotid duplex 2012 <40% Bilat; 2014 <40% bilat   

 

 Medical History  cataracts   

 

 Surgical History  left cataract surgery  2018

 

 Hospitalization History  falls   

 

 Hospitalization History  left hip pain, age-indeterminate pubic rami fracture--
Health system  16

## 2018-12-31 NOTE — XMS REPORT
[facility]

 Created on: 2018



Kely Landis

External Reference #: [id number]

: 1935

Sex: Female



Demographics







 Address  [address] 

[place]  [address]

 

 Preferred Language  Unknown

 

 Marital Status  Unknown

 

 Sikh Affiliation  Unknown

 

 Race  Unknown

 

 Ethnic Group  Unknown





Author







 Author  ODIN GAUTHIER

 

 Organization  [facility]

 

 Address  [address] [place]  [address]



 

 Phone  [phone]







Care Team Providers







 Care Team Member Name  Role  Phone

 

 ODIN GAUTHIER  Unavailable  [phone]







PROBLEMS







 Type  Condition  ICD9-CM Code  OLL95-RP Code  Onset Dates  Condition Status  
SNOMED Code

 

 Problem  Hypertension     I10     Active  00535158

 

 Problem  Dementia in other diseases classified elsewhere without behavioral 
disturbance     F02.80     Active  142141896

 

 Problem  Ataxia     R27.0     Active  53428081

 

 Problem  Hyperlipidemia     E78.5     Active  41287012

 

 Problem  Delusional disorder     F22     Active  89441496

 

 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 
condition     F28     Active  92943947

 

 Problem  Psychosis, unspecified psychosis type     F29     Active  42647436

 

 Problem  Alzheimers disease with late onset     G30.1     Active  13992123

 

 Problem  Unspecified dementia without behavioral disturbance     F03.90     
Active  20983739

 

 Problem  Hallucinations     R44.3     Active  1915012







ALLERGIES

No Information



ENCOUNTERS







 Encounter  Location  Date  Diagnosis

 

 [facility]  [id number] [place] [address] 35551-
4781  12 Dec, 2018   

 

 [facility]  [address] [address]-
0356  11 Sep, 2018   

 

 [facility]  [id number] [place] [address] [phone]
-[phone]  11 Sep, 2018  Unspecified injury of left ankle, initial encounter 
S99.912A and Unspecified injury of left foot, initial encounter S99.922A

 

 [facility]  [address] 9[address]-
0870  04 Sep, 2018  Hallucinations R44.3

 

 [facility]  [id number] [place] [address] 93107-
7010  09 Aug, 2018   

 

 [facility]  [address] [address]-
9389  08 Aug, 2018  Hallucinations R44.3 ; Hypertension I10 and Toe pain, right 
M79.674

 

 [facility]  [address] [address]386-
4694    Hallucinations R44.3 ; Alzheimers disease with late onset 
G30.1 and Toe pain, right M79.674

 

 [facility]  [address] 86183-
5168     

 

 [facility]  [address] [place] [address] 95065-
4907    Hallucinations R44.3

 

 [place] [address] [address]-
4171     

 

 [place] [address] [place] [address] 00679-
5425    Delusional disorder F22 and Psychosis, unspecified 
psychosis type F29

 

 [facility]  [address] [place] [address] 27871-
3602     

 

 [facility]  [address] [place] [address] 81447-
6780     

 

 [facility]  [address] [place] [address] 59156-
9325  22 May, 2018  Local infection of the skin and subcutaneous tissue, 
unspecified L08.9 and Other injury of unspecified body region, initial 
encounter T14.8XXA

 

 [facility]  [address] [place] [address] 17852-
4788  17 May, 2018   

 

 [facility]  [address] [place] [address] 99098-
2184  10 May, 2018   

 

 [facility]  [address] [place] [address] 33386-
1161    Hallucinations R44.3

 

 [facility]  [address] [place] [address] 61631-
2387  19 Mar, 2018  Psychosis, unspecified psychosis type F29

 

 [facility]  [address] [address]-
2574  15 Mar, 2018   

 

 [facility]  [address] [place] [address] 92432-
7962  13 Mar, 2018   

 

 [facility]  [address] [place] [address] 61124-
0218    Unspecified dementia without behavioral disturbance F03.90 
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 
condition F28

 

 [facility]  [address] [place] [address] 41324-
8708     

 

 [facility]  [address] [place] [address] 78918-
4950     

 

 [facility]  [address] [place] [address] 64858-
5223     

 

 [facility]  [address] [place] [address] 36062-
7982    Dementia, unspecified, without behavioral disturbance F03.90

 

 [facility]  [address] [place] [address] 81319-
1612  06 Dec, 2017  Medicare annual wellness visit, initial Z00.00 and 
Encounter for immunization Z23

 

 [facility]  [address] [place] [address] 89086-
1235    Ataxia R27.0 and Hallucinations R44.3

 

 [facility]  [address] [place] [address] 33909-
4202     

 

 [facility]  [address] [place] [address] 53344-
7719  12 Oct, 2017  Encounter for immunization Z23

 

 [facility]  [address] [place] [address] 29715-
7858  11 Sep, 2017  Hallucinations R44.3

 

 [facility]  [address] [place] [address] 03755-
7649  05 Sep, 2017  Hallucinations R44.3

 

 [facility]  [address] [place] [address] 08727-
7784  30 Aug, 2017  Arthralgia of left knee M25.562 ; Age-related nuclear 
cataract of both eyes H25.13 and Hallucinations R44.3

 

 [facility]  [address] [place] [address] 87191-
4408  24 Aug, 2017   

 

 [facility]  [address] 72563-
3999  14 Aug, 2017   

 

 [facility]  [address] [place] [address] 24469-
2434    Hyperlipidemia E78.5 and Hypertension I10

 

 [place] [address] [place] [address] 80154-
8799    Hyperlipidemia E78.5 and Hypertension I10

 

 [place] [address] 25195-
8737     

 

 [facility]  [address] [place] [address] 46865-
9925  15 Feb, 2017  Hypertension I10 ; Alzheimers disease with late onset G30.1 
; Dementia in other diseases classified elsewhere without behavioral 
disturbance F02.80 and Ataxia R27.0

 

 [place] [address] [place] [address] 73045-
6346  14 2016   

 

 [facility]  [address] [place] [address] 16194-
2518     

 

 [facility]  [address] [place] [address] 24826-
5191    Hypertension I10 and Ataxia R27.0

 

 [place] [address] 87795-
3023  14 Oct, 2016   

 

 [place] [address] [place] [address] 04648-
5906  26 Sep, 2016   

 

 [facility]  [address] 91295-
0470  23 Sep, 2016   

 

 [facility]  [address] [place] [address] 59951-
8617  23 Sep, 2016   

 

 [facility]  [address] [place] [address] 14164-
5913  20 Sep, 2016   

 

 [facility]  [address] [place] [address] 49108-
1519  16 Sep, 2016   

 

 [facility]  [address] [place] [address] [phone]
-[phone]  13 Aug, 2016  Urinary frequency R35.0 and Acute cystitis without 
hematuria N30.00

 

 [facility]  [address] [place] [address] 59909-
1761     

 

 [facility]  [address] [place] [address] 98957-
6614     

 

 [facility]  [address] [place] [address] 90016-
5946  11 Mar, 2016  Hyperlipidemia E78.5

 

 [facility]  [address] [place] [address] 10996-
1494  10 Mar, 2016  Ataxia R27.0 ; Hyperlipidemia E78.5 and Hypertension I10

 

 [facility]  [address] [place] [address] 37852-
4413     

 

 [facility]  [address] [place] [address] 12078-
1426    Ataxia R27.0 ; Senile cataracts of both eyes H25.9 and 
Constipation, unspecified constipation type K59.00

 

 [facility]  [address] [place] [address] 93616-
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

 

 [facility]  [address] [place] [address] 55622-
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

 

 [facility]  [address] [address]-
0179  21 Aug, 2015  Dementia 294.20

 

 [facility]  [id number] [address]-
1686  13 Aug, 2015  Establishing care with new doctor, encounter for V65.8 ; 
Ataxia 781.3 ; Horizontal nystagmus 379.56 ; Hypertension 401.9 ; 
Hyperlipidemia 272.4 and History of TIA (transient ischemic attack) V12.54

 

 [place] [id number] [address]-
1885  06 Aug, 2015   

 

 [address] [address] [facility] [id number] [address]-
1481  05 Aug, 2015   







IMMUNIZATIONS

No Known Immunizations



SOCIAL HISTORY

Never Assessed



REASON FOR VISIT

Requests Provider to return call



PLAN OF CARE





VITAL SIGNS





MEDICATIONS

Unknown Medications



RESULTS

No Results



PROCEDURES

No Known procedures



INSTRUCTIONS





MEDICATIONS ADMINISTERED

No Known Medications



MEDICAL (GENERAL) HISTORY







 Type  Description  Date

 

 Medical History  coronary artery disease   

 

 Medical History  Carotid stenosis   

 

 Medical History  hypertension   

 

 Medical History  hyperlipidemia   

 

 Medical History  mitral valve regurgitation   

 

 Medical History  vitamin D deficiency   

 

 Medical History  nystagmus   

 

 Medical History  chest pain syndrome/palpitations   

 

 Medical History  ataxia/unsteady gait   

 

 Medical History  TIAs   

 

 Medical History  stress test 2012 EF 81%; 2014 EF 78%   

 

 Medical History  echo 2012 EF 60% mild MR,TR, AV stenosis; 2014 EF 60% 
aortic regurgitatio, MR, TR   

 

 Medical History  carotid duplex 2012 <40% Bilat; 2014 <40% bilat   

 

 Medical History  cataracts   

 

 Surgical History  left cataract surgery  2018

 

 Hospitalization History  falls   

 

 Hospitalization History  left hip pain, age-indeterminate pubic rami fracture--
[facility]  16

## 2018-12-31 NOTE — XMS REPORT
[facility]

 Created on: 2018



LandisAdwoaKely

External Reference #: [id number]

: 1935

Sex: Female



Demographics







 Address  [address] [place]  [address]

 

 Preferred Language  Unknown

 

 Marital Status  Unknown

 

 Christianity Affiliation  Unknown

 

 Race  Unknown

 

 Ethnic Group  Unknown





Author







 Author  ODIN GAUTHIER

 

 Organization  [facility]

 

 Address  [address] [place]  [address]



 

 Phone  [phone]







Care Team Providers







 Care Team Member Name  Role  Phone

 

 ODIN GAUTHIER  Unavailable  [phone]







PROBLEMS







 Type  Condition  ICD9-CM Code  KBX75-QF Code  Onset Dates  Condition Status  
SNOMED Code

 

 Problem  Hypertension     I10     Active  52644831

 

 Problem  Dementia in other diseases classified elsewhere without behavioral 
disturbance     F02.80     Active  511796259

 

 Problem  Ataxia     R27.0     Active  38931940

 

 Problem  Hyperlipidemia     E78.5     Active  94352686

 

 Problem  Delusional disorder     F22     Active  32791085

 

 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 
condition     F28     Active  84021136

 

 Problem  Psychosis, unspecified psychosis type     F29     Active  90473325

 

 Problem  Alzheimers disease with late onset     G30.1     Active  86148618

 

 Problem  Unspecified dementia without behavioral disturbance     F03.90     
Active  78092360

 

 Problem  Hallucinations     R44.3     Active  1394658







ALLERGIES

No Known Allergies



ENCOUNTERS







 Encounter  Location  Date  Diagnosis

 

 [address] [id number] [place] [address] 88701-
4066  04 Sep, 2018   

 

 [address] [id number] [place] [address] 61655-
5721  09 Aug, 2018   

 

 [address] 0[address]27-
1367  08 Aug, 2018  Hallucinations R44.3 ; Hypertension I10 and Toe pain, right 
M79.674

 

 [address] 77405-
2820    Hallucinations R44.3 ; Alzheimers disease with late onset 
G30.1 and Toe pain, right M79.674

 

 [address] [id number] [place] [address] 95510-
8396     

 

 [address] [address]-
8993  19 2018  Hallucinations R44.3

 

 [facility]  [id number] [place] [address] 77085-
6718  14 2018   

 

 [facility]  [id number] [place] [address] 31629-
8277    Delusional disorder F22 and Psychosis, unspecified 
psychosis type F29

 

 [facility]  [id number] [place] [address] 47812-
2907     

 

 [facility]  [id number] [place] [address] 60574-
9200     

 

 [facility]  [id number] [place] [address] 51353-
6863  22 May, 2018  Local infection of the skin and subcutaneous tissue, 
unspecified L08.9 and Other injury of unspecified body region, initial 
encounter T14.8XXA

 

 [facility]  [id number] [address]-
1175  17 May, 2018   

 

 [facility]  [id number] [place] [address] 65329-
5183  10 May, 2018   

 

 [facility]  [address] [place] [address] 45643-
5597    Hallucinations R44.3

 

 [facility]  [address] [place] [address] 47052-
5006  19 Mar, 2018  Psychosis, unspecified psychosis type F29

 

 [facility]  [address] [address]-
6384  15 Mar, 2018   

 

 [facility]  [id number] [place] [address] 20830-
1574  13 Mar, 2018   

 

 [facility]  [address] [address]-
7876    Unspecified dementia without behavioral disturbance F03.90 
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 
condition F28

 

 [id number] [place] [address] 82222-
9464     

 

 [facility]  [address] 38723-
4194     

 

 [facility]  [id number] [place] [address] 70262-
4478     

 

 [facility]  [address] 23335-
9950    Dementia, unspecified, without behavioral disturbance F03.90

 

 [id number] [place] [address] 06014-
3074  06 Dec, 2017  Encounter for immunization Z23 and Medicare annual wellness 
visit, initial Z00.00

 

 [address] [place] [id number] [place] [address] 20817-
0445    Ataxia R27.0 and Hallucinations R44.3

 

 [address] [place] [id number] [place] [address] 64589-
3299     

 

 [id number] [place] [address] 84753-
8889  12 Oct, 2017  Encounter for immunization Z23

 

 [place] [id number] [place] [address] 02886-
7941  11 Sep, 2017  Hallucinations R44.3

 

 [address] [place] [address] 01937-
8288  05 Sep, 2017  Hallucinations R44.3

 

 [address] [place] [id number] [place] [address] 92873-
6956  30 Aug, 2017  Arthralgia of left knee M25.562 ; Age-related nuclear 
cataract of both eyes H25.13 and Hallucinations R44.3

 

 [place] [id number] [place] [address] 57413-
7871  24 Aug, 2017   

 

 [address] [place] [id number] [place] [address] 26648-
5117  14 Aug, 2017   

 

 [facility]  [id number] [place] [address] 90239-
9483  19 2017  Hyperlipidemia E78.5 and Hypertension I10

 

 [facility]  [id number] [place] [address] 19584-
4863    Hyperlipidemia E78.5 and Hypertension I10

 

 [facility]  [id number] [place] [address] 82101-
8872     

 

 [facility]  [id number] [place] [address] 36908-
6108  15 2017  Hypertension I10 ; Alzheimers disease with late onset G30.1 
; Dementia in other diseases classified elsewhere without behavioral 
disturbance F02.80 and Ataxia R27.0

 

 [facility]  [id number] [place] [address] 91655-
2732  14 2016   

 

 [facility]  [address] 21341-
4080  08 2016   

 

 [facility]  [id number] [place] [address] 27456-
6982  07 2016  Hypertension I10 and Ataxia R27.0

 

 [facility]  [id number] [place] [address] 58150-
9354  14 Oct, 2016   

 

 [facility]  [id number] [place] [address] 56343-
4367  26 Sep, 2016   

 

 [facility]  [id number] [place] [address] 65760-
1929  23 Sep, 2016   

 

 [facility]  [id number] [place] [address] 42194-
9111  23 Sep, 2016   

 

 [facility]  [id number] [place] [address] 30554-
7487  20 Sep,    

 

 [facility]  [id number] [place] [address] 54919-
5108  16 Sep, 2016   

 

 [facility]  [id number] [place] [address] 32951
-[phone]  13 Aug, 2016  Urinary frequency R35.0 and Acute cystitis without 
hematuria N30.00

 

 [address] [place] [id number] [place] [address] 72256-
4478     

 

 [facility]  [id number] [place] [address] 07907-
5713     

 

 [place] [address] 02225-
2092  11 Mar, 2016  Hyperlipidemia E78.5

 

 [place] [address] 63158-
5805  10 Mar, 2016  Ataxia R27.0 ; Hyperlipidemia E78.5 and Hypertension I10

 

 [place] [address] 10878-
5683     

 

 [place] [address] 28115-
5033    Ataxia R27.0 ; Senile cataracts of both eyes H25.9 and 
Constipation, unspecified constipation type K59.00

 

 [place] [address] 89175-
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

 

 [place] [address] [place] [address] 58468-
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

 

 [place] [address] [place] [address] 69138-
8488  21 Aug, 2015  Dementia 294.20

 

 [place] [address] [place] [address] 43475-
1958  13 Aug, 2015  Establishing care with new doctor, encounter for V65.8 ; 
Ataxia 781.3 ; Horizontal nystagmus 379.56 ; Hypertension 401.9 ; 
Hyperlipidemia 272.4 and History of TIA (transient ischemic attack) V12.54

 

 [place] [address] 35211-
5113  06 Aug, 2015   

 

 [facility]  [address] [facility] [id number] [address]-
4860  05 Aug, 2015   







IMMUNIZATIONS

No Known Immunizations



SOCIAL HISTORY

Never Assessed



REASON FOR VISIT

BP issues being too low-Ayla EDWARDS



PLAN OF CARE







 Activity  Details









  









 Follow Up  4 Weeks Reason:psychosis







VITAL SIGNS







 Height  64 in  2018

 

 Weight  114.3 lbs  2018

 

 Temperature  97.8 degrees Fahrenheit  2018

 

 Heart Rate  68 bpm  2018

 

 Respiratory Rate  18   2018

 

 BMI  19.62 kg/m2  2018

 

 Blood pressure systolic  128 mmHg  2018

 

 Blood pressure diastolic  78 mmHg  2018







MEDICATIONS







 Medication  Instructions  Dosage  Frequency  Start Date  End Date  Duration  
Status

 

 Bactrim -160 MG  Orally Twice a day  1 tablet  12h           Active

 

 Aricept 10 mg  Orally Once a day  1 tablet at bedtime  24h       
30 day(s)  Active

 

 Fish Oil 1000 MG  Orally Twice a day  1 capsule  12h           Active

 

 Mupirocin 2 %  Externally 2 times a day  1 application to affected area  12h  
         Active

 

 Vitamin D 1000 UNIT  Orally Once a day  1 tablet  24h           Active







RESULTS

No Results



PROCEDURES







 Procedure  Date Ordered  Result  Body Site

 

 [facility] VISIT ESTABLISHED PATIENT  2018      







INSTRUCTIONS





MEDICATIONS ADMINISTERED

No Known Medications



MEDICAL (GENERAL) HISTORY







 Type  Description  Date

 

 Medical History  coronary artery disease   

 

 Medical History  Carotid stenosis   

 

 Medical History  hypertension   

 

 Medical History  hyperlipidemia   

 

 Medical History  mitral valve regurgitation   

 

 Medical History  vitamin D deficiency   

 

 Medical History  nystagmus   

 

 Medical History  chest pain syndrome/palpitations   

 

 Medical History  ataxia/unsteady gait   

 

 Medical History  TIAs   

 

 Medical History  stress test 2012 EF 81%; 2014 EF 78%   

 

 Medical History  echo 2012 EF 60% mild MR,TR, AV stenosis; 2014 EF 60% 
aortic regurgitatio, MR, TR   

 

 Medical History  carotid duplex 2012 <40% Bilat; 2014 <40% bilat   

 

 Medical History  cataracts   

 

 Surgical History  left cataract surgery  2018

 

 Hospitalization History  falls   

 

 Hospitalization History  left hip pain, age-indeterminate pubic rami fracture--
[facility]  16

## 2018-12-31 NOTE — XMS REPORT
Clinical Summary

 Created on: 2018



Kely Landis HAZEL

External Reference #: [id number]

: 1935

Sex: Female



Demographics







 Address  [address] [place] [address] 

[place]  [address]

 

 Home Phone  [phone]

 

 Preferred Language  English

 

 Marital Status  Unknown

 

 Islam Affiliation  CAT

 

 Race  White

 

 Ethnic Group  Not  or 





Author







 Author  [facility]

 

 Organization  [facility]

 

 Address  Unknown

 

 Phone  Unavailable







Support







 Name  Relationship  Address  Phone

 

 Nikki Fraser  ECON  POA

Unknown  [phone]

 

 Keith Fraser  [employer]  [employer]

Unknown  [phone]







Care Team Providers







 Care Team Member Name  Role  Phone

 

 Ayush Cortez MD  Unavailable  Unavailable







Source Comments

Some departments are not documenting in the electronic medical record.  If you 
do not see the information that you expected, contact Release of Information in 
the Health Information Management department at [phone] for further 
assistance in locating additional records.[facility]



Allergies

No Known Allergies



Medications







      End Date  Status



  Medication   Sig   Dispensed   Refills   Start  



      Date  

 

         Active



  acetaminophen (TYLENOL)   Take 325 mg    0   



  325 mg tablet   by mouth     



   twice daily.     

 

         Active



  IBUPROFEN PO   Take  by    0   



   mouth daily.     

 

         Active



  aspirin EC 81 mg tablet   Take 81 mg by    0   



   mouth daily.     

 

         Active



  Coenzyme Q10 (CO Q-10) 10   Take  by    0   



  mg cap   mouth daily.     

 

         Active



  magnesium oxide 400 mg   Take  by    0   



  cap   mouth daily.     

 

         Active



  nebivolol (BYSTOLIC) 5 mg   Take 5 mg by    0   



  tablet   mouth daily.     

 

         Active



  lisinopril (PRINIVIL,   Take 40 mg by    0   



  ZESTRIL) 40 mg tablet   mouth daily.     

 

         Active



  citalopram (CELEXA) 20 mg   Take 20 mg by    0   



  tablet   mouth daily.     

 

         Active



  simvastatin (ZOCOR) 20 mg   Take 20 mg by    0   



  tablet   mouth at     



   bedtime     



   daily.     







Active Problems







 



  Problem   Noted Date

 

 



  Cerebellar degeneration   2015







Family History







   



  Medical History   Relation   Name   Comments

 

   



  Diabetes   Father  

 

   



  Hypertension   Father  

 

   



  Hypertension   Mother  









   



  Relation   Name   Status   Comments

 

   



  Father     

 

   



  Mother     







Social History







     Date



  Tobacco Use   Types   Packs/Day   Years Used 

 

      



  Never Smoker    

 

    



  Smokeless Tobacco: Never   



  Used   









   



  Alcohol Use   Drinks/Week   oz/Week   Comments

 

   



  No   0 Standard   0.0 



   drinks or  



   equivalent  









 



  Sex Assigned at Birth   Date Recorded

 

 



  Not on file 









   Industry



  Job Start Date   Occupation 

 

   Not on file



  Not on file   Not on file 









   Travel End



  Travel History   Travel Start 













  No recent travel history available.







Last Filed Vital Signs







   Time Taken



  Vital Sign   Reading 

 

   2016  2:13 PM CDT



  Blood Pressure   125/68 

 

   2016  2:13 PM CDT



  Pulse   61 

 

   -



  Temperature   - 

 

   -



  Respiratory Rate   - 

 

   -



  Oxygen Saturation   - 

 

   -



  Inhaled Oxygen   - 



  Concentration  

 

   2016  2:13 PM CDT



  Weight   57.2 kg (126 lb) 

 

   2016  2:13 PM CDT



  Height   162.6 cm (5' 4") 

 

   2016  2:13 PM CDT



  Body Mass Index   21.63 







Plan of Treatment







   



  Health Maintenance   Due Date   Last Done   Comments

 

   



  PHYSICAL (COMPREHENSIVE)   1942  



  EXAM   

 

   



  DTAP/TDAP VACCINES (1 -   1953  



  Tdap)   

 

   



  SHINGLES RECOMBINANT   1985  



  VACCINE (1 of 2)   

 

   



  OSTEOPOROSIS   2000  



  SCREENING/MONITORING   

 

   



  PNEUMONIA (PCV13/PPSV23)   2000  



  VACCINES (1 of 2 - PCV13)   

 

   



  INFLUENZA VACCINE   2018  







Results

Not on filefrom Last 3 Months



Insurance







     



  Payer   Benefit   Subscriber ID   Type   Phone   Address



   Plan /    



   Group    

 

     



  MEDICARE   MEDICARE   xxxxxxxxxx   Medicare  



   PART A AND    



   B    









     



  Guarantor Name   Account   Relation to   Date of   Phone   Billing Address



   Type   Patient   Birth  

 

     



  Kely Landis   Personal/F   Self   1935   [phone]    
[address]     ([place])   [place] [address]







Advance Directives





Patient has advance care planning documents on file. For more information, 
please contact:



[facility]



[address]



[place] [address]

## 2018-12-31 NOTE — XMS REPORT
[facility]

 Created on: 2018



Boby Kely

External Reference #: [id number]

: 1935

Sex: Female



Demographics







 Address  [address] 

[place]  [address]

 

 Preferred Language  Unknown

 

 Marital Status  Unknown

 

 Episcopalian Affiliation  Unknown

 

 Race  Unknown

 

 Ethnic Group  Unknown





Author







 Author  ODIN GAUTHIER

 

 Organization  [facility]

 

 Address  [address] [place]  [address]



 

 Phone  [phone]







Care Team Providers







 Care Team Member Name  Role  Phone

 

 ODIN GAUTHIER  Unavailable  [phone]







PROBLEMS







 Type  Condition  ICD9-CM Code  ACP77-QE Code  Onset Dates  Condition Status  
SNOMED Code

 

 Problem  Hypertension     I10     Active  70185999

 

 Problem  Dementia in other diseases classified elsewhere without behavioral 
disturbance     F02.80     Active  533557132

 

 Problem  Ataxia     R27.0     Active  61259518

 

 Problem  Hyperlipidemia     E78.5     Active  45501794

 

 Problem  Delusional disorder     F22     Active  73641513

 

 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 
condition     F28     Active  28775438

 

 Problem  Psychosis, unspecified psychosis type     F29     Active  66845133

 

 Problem  Alzheimers disease with late onset     G30.1     Active  45250101

 

 Problem  Unspecified dementia without behavioral disturbance     F03.90     
Active  43564601

 

 Problem  Hallucinations     R44.3     Active  7652095







ALLERGIES

No Information



ENCOUNTERS







 Encounter  Location  Date  Diagnosis

 

 [facility]  [id number] [place] [address] [phone]-
7344  11 Sep, 2018   

 

 [facility]  [id number] [place] [address] [phone]
[phone]  11 Sep, 2018  Unspecified injury of left ankle, initial encounter 
S99.912A and Unspecified injury of left foot, initial encounter S99.922A

 

 [facility]  [address] [place] [address] 91512-
8772  04 Sep, 2018  Hallucinations R44.3

 

 [facility]  [id number] [place] [address] 30129-
4458  09 Aug, 2018   

 

 [facility]  [id number] [place] [address] 03240-
3007  08 Aug, 2018  Hallucinations R44.3 ; Hypertension I10 and Toe pain, right 
M79.674

 

 [facility]  [id number] [place] [address] 86350-
4553    Hallucinations R44.3 ; Alzheimers disease with late onset 
G30.1 and Toe pain, right M79.674

 

 [facility]  [id number] [place] [address] 79627-
0658     

 

 [facility]  [id number] [place] [address] 25441-
6883    Hallucinations R44.3

 

 [facility]  [id number] [place] [address] 26539-
0143     

 

 [facility]  [id number] [place] [address] 56914-
3664    Delusional disorder F22 and Psychosis, unspecified 
psychosis type F29

 

 [facility]  [id number] [place] [address] 35202-
8123     

 

 [facility]  [id number] [place] [address] 97789-
7987     

 

 [facility]  [id number] [place] [address] 58760-
3868  22 May, 2018  Local infection of the skin and subcutaneous tissue, 
unspecified L08.9 and Other injury of unspecified body region, initial 
encounter T14.8XXA

 

 [facility]  [id number] [place] [address] 75877-
0771  17 May, 2018   

 

 [facility]  [id number] [place] [address] 93595-
6402  10 May, 2018   

 

 [facility]  [id number] [place] [address] 23592-
8786    Hallucinations R44.3

 

 [facility]  [id number] [place] [address] 94954-
0748  19 Mar, 2018  Psychosis, unspecified psychosis type F29

 

 [facility]  [id number] [place] [address] 41733-
7845  15 Mar, 2018   

 

 [facility]  [address] [address]-
4523  13 Mar, 2018   

 

 [facility]  [id number] [place] [address] 07920-
9714    Unspecified dementia without behavioral disturbance F03.90 
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 
condition F28

 

 [facility]  [id number] [place] [address] 87728-
1877     

 

 [facility]  [address] [place] [address] 03564-
3260     

 

 [facility]  [id number] [place] [address] 79389-
9244     

 

 [facility]  [id number] [place] [address] 81064-
8860    Dementia, unspecified, without behavioral disturbance F03.90

 

 [facility]  [address] [address]-
1583  06 Dec, 2017  Medicare annual wellness visit, initial Z00.00 and 
Encounter for immunization Z23

 

 [id number] [place] [address] 68775-
7857    Ataxia R27.0 and Hallucinations R44.3

 

 [id number] [place] [address] 83996-
7101     

 

 [facility]  [id number] [place] [address] 84636-
5427  12 Oct, 2017  Encounter for immunization Z23

 

 [facility]  [id number] [place] [address] 96439-
8215  11 Sep, 2017  Hallucinations R44.3

 

 [facility]  [id number] [place] [address] 10685-
5151  05 Sep, 2017  Hallucinations R44.3

 

 [facility]  [id number] [place] [address] 42163-
1273  30 Aug, 2017  Arthralgia of left knee M25.562 ; Age-related nuclear 
cataract of both eyes H25.13 and Hallucinations R44.3

 

 [facility]  [address] [address]-
3480  24 Aug, 2017   

 

 [facility]  [address] 8[address]13-
9387  14 Aug, 2017   

 

 [facility]  [address] [address]-
2736    Hyperlipidemia E78.5 and Hypertension I10

 

 [address] 8[address]63-
6269    Hyperlipidemia E78.5 and Hypertension I10

 

 [facility]  [address] [address]-
2329     

 

 [facility]  [address] [address]-
7544  15 Feb, 2017  Hypertension I10 ; Alzheimers disease with late onset G30.1 
; Dementia in other diseases classified elsewhere without behavioral 
disturbance F02.80 and Ataxia R27.0

 

 [facility]  [address] [address]-
8583     

 

 [facility]  [id number] [place] [address] 68788-
1826     

 

 [facility]  [address] 10126-
7582    Hypertension I10 and Ataxia R27.0

 

 [facility]  [id number] [place] [address] 00152-
6249  14 Oct, 2016   

 

 [facility]  [address] [address]-
8800  26 Sep, 2016   

 

 [facility]  [address] [phone]-
1970  23 Sep, 2016   

 

 [facility]  [address] [address]13-
3139  23 Sep, 2016   

 

 [facility]  [address] [address]-
4567  20 Sep, 2016   

 

 [facility]  [id number] [place] [address] 64870-
1351  16 Sep, 2016   

 

 [facility]  [id number] [place] [address] [phone]
[phone]  13 Aug, 2016  Urinary frequency R35.0 and Acute cystitis without 
hematuria N30.00

 

 [facility]  [id number] [place] [address] 35209-
6967     

 

 [facility]  [address] [place] [address] 85381-
7124     

 

 [facility]  [address] [place] [address] 53545-
3939  11 Mar, 2016  Hyperlipidemia E78.5

 

 [address] [place] [address] 56433-
5797  10 Mar, 2016  Ataxia R27.0 ; Hyperlipidemia E78.5 and Hypertension I10

 

 [facility]  [id number] [place] [address] 77876-
0444     

 

 [facility]  [address] [place] [address] 10208-
4900    Ataxia R27.0 ; Senile cataracts of both eyes H25.9 and 
Constipation, unspecified constipation type K59.00

 

 [facility]  [address] [place] [address] 39740-
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

 

 [facility]  [address] [place] [address] 03662-
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

 

 [facility]  [address] [place] [address] 41599-
0906  21 Aug, 2015  Dementia 294.20

 

 [facility]  [address] [place] [address]-
1046  13 Aug, 2015  Establishing care with new doctor, encounter for V65.8 ; 
Ataxia 781.3 ; Horizontal nystagmus 379.56 ; Hypertension 401.9 ; 
Hyperlipidemia 272.4 and History of TIA (transient ischemic attack) V12.54

 

 [facility]  [address] [facility] [id number] [address]-
4349  06 Aug, 2015   

 

 [facility]  [address] [facility] [id number] [address]-
8887  05 Aug, 2015   







IMMUNIZATIONS

No Known Immunizations



SOCIAL HISTORY

Never Assessed



REASON FOR VISIT

Urgent nurse call



PLAN OF CARE





VITAL SIGNS





MEDICATIONS

Unknown Medications



RESULTS

No Results



PROCEDURES

No Known procedures



INSTRUCTIONS





MEDICATIONS ADMINISTERED

No Known Medications



MEDICAL (GENERAL) HISTORY







 Type  Description  Date

 

 Medical History  coronary artery disease   

 

 Medical History  Carotid stenosis   

 

 Medical History  hypertension   

 

 Medical History  hyperlipidemia   

 

 Medical History  mitral valve regurgitation   

 

 Medical History  vitamin D deficiency   

 

 Medical History  nystagmus   

 

 Medical History  chest pain syndrome/palpitations   

 

 Medical History  ataxia/unsteady gait   

 

 Medical History  TIAs   

 

 Medical History  stress test 2012 EF 81%; 2014 EF 78%   

 

 Medical History  echo 2012 EF 60% mild MR,TR, AV stenosis; 2014 EF 60% 
aortic regurgitatio, MR, TR   

 

 Medical History  carotid duplex 2012 <40% Bilat; 2014 <40% bilat   

 

 Medical History  cataracts   

 

 Surgical History  left cataract surgery  2018

 

 Hospitalization History  falls   

 

 Hospitalization History  left hip pain, age-indeterminate pubic rami fracture--
[facility]  16

## 2018-12-31 NOTE — NUR
Discussed pt case with daughter and DPLUDIN Jordan. She, and the pt, are unsure if the pt has 
had the flu shot this year, staff have been asked to hold until we know for sure. Nikki 
also requested that we not tell the pt that she has dementia d/t hx of severe depression, 
stating "if she finds out she will give up". Will pass request along to staff and Dr. Hewitt.  

Pt has been A&Ox4 since admission, forgetful at times. Uses call light appropriately. Will 
continue to monitor pt condition.

## 2018-12-31 NOTE — XMS REPORT
[facility]

 Created on: 2018



Kely Landis

External Reference #: [id number]

: 1935

Sex: Female



Demographics







 Address  [address] [place]  [address]

 

 Preferred Language  Unknown

 

 Marital Status  Unknown

 

 Confucianism Affiliation  Unknown

 

 Race  Unknown

 

 Ethnic Group  Unknown





Author







 Author  ROBBI ENCISO

 

 Organization  [facility]

 

 Address  [address] [place]  [address]



 

 Phone  [phone]







Care Team Providers







 Care Team Member Name  Role  Phone

 

 ROBBI ENCISO  Unavailable  [phone]







PROBLEMS







 Type  Condition  ICD9-CM Code  IBD49-RD Code  Onset Dates  Condition Status  
SNOMED Code

 

 Problem  Hypertension     I10     Active  54965942

 

 Problem  Dementia in other diseases classified elsewhere without behavioral 
disturbance     F02.80     Active  927874648

 

 Problem  Ataxia     R27.0     Active  30646155

 

 Problem  Hyperlipidemia     E78.5     Active  34166486

 

 Problem  Delusional disorder     F22     Active  85075018

 

 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 
condition     F28     Active  67575075

 

 Problem  Psychosis, unspecified psychosis type     F29     Active  55861605

 

 Problem  Alzheimers disease with late onset     G30.1     Active  78102420

 

 Problem  Unspecified dementia without behavioral disturbance     F03.90     
Active  64027877

 

 Problem  Hallucinations     R44.3     Active  9610697







ALLERGIES

No Information



ENCOUNTERS







 Encounter  Location  Date  Diagnosis

 

 [place] [id number] [place] [address] [phone]-
[phone]  04 Sep, 2018   

 

 [place] [address] [place] [address] [phone]-
1995  09 Aug, 2018   

 

 [facility]  [id number] [place] [address] 76094-
8224  08 Aug, 2018  Hallucinations R44.3 ; Hypertension I10 and Toe pain, right 
M79.674

 

 [facility]  [id number] [place] [address] 57014-
8412    Hallucinations R44.3 ; Alzheimers disease with late onset 
G30.1 and Toe pain, right M79.674

 

 [facility]  [id number] [address]
57[address]     

 

 [facility]  [id number], [place] [address]-
2653  19 2018  Hallucinations R44.3

 

 [facility]  [id number] [place] [address] 91452-
9866  14 2018   

 

 [facility]  [id number] [place] [address] 50952-
6994    Delusional disorder F22 and Psychosis, unspecified 
psychosis type F29

 

 [facility]  [id number] [place] [address] 47589-
9438     

 

 [facility]  [id number] [place] [address] 40509-
6596     

 

 [facility]  [id number] [place] [address] 66677-
3517  22 May, 2018  Local infection of the skin and subcutaneous tissue, 
unspecified L08.9 and Other injury of unspecified body region, initial 
encounter T14.8XXA

 

 [facility]  [address] [place] [address] 36504-
9612  17 May, 2018   

 

 [facility]  [address] [place] [address] 45107-
5772  10 May, 2018   

 

 [facility]  [id number] [place] [address] 72166-
7270    Hallucinations R44.3

 

 [facility]  [address] [place] [address] 85035-
2260  19 Mar, 2018  Psychosis, unspecified psychosis type F29

 

 [facility]  [address] [address]-
0123  15 Mar, 2018   

 

 [facility]  [id number] [place] [address] 57553-
6189  13 Mar, 2018   

 

 [facility]  [address] [place] [address] 45148-
0238    Unspecified dementia without behavioral disturbance F03.90 
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 
condition F28

 

 [facility]  [id number] [place] [address] 84797-
[phone]     

 

 [facility]  [id number] [place] [address] 70618-
2422     

 

 [facility]  [id number] [place] [address] 91895-
4017     

 

 [facility]  [address] [address]-
6748    Dementia, unspecified, without behavioral disturbance F03.90

 

 [place] [id number] [place] [address] 54352-
2876  06 Dec, 2017  Medicare annual wellness visit, initial Z00.00 and 
Encounter for immunization Z23

 

 [place] [id number] [place] [address] 93355-
4888    Ataxia R27.0 and Hallucinations R44.3

 

 [place] [address] [address]-
8017     

 

 [place] [id number] [place] [address] 83549-
2444  12 Oct, 2017  Encounter for immunization Z23

 

 [place] [id number] [place] [address] 99656-
3585  11 Sep, 2017  Hallucinations R44.3

 

 [place] [id number] [place] [address] 53648-
8318  05 Sep, 2017  Hallucinations R44.3

 

 [place] [id number] [place] [address] 88432-
5813  30 Aug, 2017  Arthralgia of left knee M25.562 ; Age-related nuclear 
cataract of both eyes H25.13 and Hallucinations R44.3

 

 [place] [id number] [place] [address] 20836-
0188  24 Aug, 2017   

 

 [place] [id number] [place] [address] 00944-
9074  14 Aug, 2017   

 

 [place] [id number] [place] [address] 90498-
2790  19 2017  Hyperlipidemia E78.5 and Hypertension I10

 

 [facility]  [id number] [place] [address] 14867-
6222  16 2017  Hyperlipidemia E78.5 and Hypertension I10

 

 [facility]  [id number] [place] [address] 02668-
6563     

 

 [facility]  [id number] [place] [address] 52997-
0883  15 2017  Hypertension I10 ; Alzheimers disease with late onset G30.1 
; Dementia in other diseases classified elsewhere without behavioral 
disturbance F02.80 and Ataxia R27.0

 

 [facility]  [id number] [place] [address] 77030-
6555  14 2016   

 

 [facility]  [id number] [place] [address] 20886-
7279  08 2016   

 

 [facility]  [id number] [place] [address] 77483-
0707    Hypertension I10 and Ataxia R27.0

 

 [facility]  [id number] [place] [address] 21322-
6458  14 Oct, 2016   

 

 [facility]  [id number] [place] [address] 88827-
6604  26 Sep, 2016   

 

 [facility]  [id number] [place] [address] 04245-
8308  23 Sep, 2016   

 

 [facility]  [id number] [place] [address] 18729-
8059  23 Sep, 2016   

 

 [facility]  [id number] [place] [address] 18670-
2540  20 Sep, 2016   

 

 [facility]  [address] [place] [address] 16587-
8732  16 Sep, 2016   

 

 [facility]  [id number] [place] [address] [phone]
-[phone]  13 Aug, 2016  Urinary frequency R35.0 and Acute cystitis without 
hematuria N30.00

 

 [place] [id number] [place] [address] 04959-
8804     

 

 [facility]  [id number] [place] [address] 72025-
1596     

 

 [place] [address] [address]-
9818  11 Mar, 2016  Hyperlipidemia E78.5

 

 [place] [address] [address]-
4279  10 Mar, 2016  Ataxia R27.0 ; Hyperlipidemia E78.5 and Hypertension I10

 

 [place] [id number] [place] [address] 56658-
7596     

 

 [place] [id number] [place] [address] 31144-
9510    Ataxia R27.0 ; Senile cataracts of both eyes H25.9 and 
Constipation, unspecified constipation type K59.00

 

 [place] [id number] [place] [address] 34441-
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

 

 [place] [id number] [place] [address] 25484-
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

 

 [place] [id number] [place] [address] 25074-
5995  21 Aug, 2015  Dementia 294.20

 

 [place] [id number] [place] [address] 08317-
1872  13 Aug, 2015  Establishing care with new doctor, encounter for V65.8 ; 
Ataxia 781.3 ; Horizontal nystagmus 379.56 ; Hypertension 401.9 ; 
Hyperlipidemia 272.4 and History of TIA (transient ischemic attack) V12.54

 

 [place] [id number] [place] [address] 18851-
9243  06 Aug, 2015   

 

 [facility]  [address] [facility] [id number] [address]-
0932  05 Aug, 2015   







IMMUNIZATIONS

No Known Immunizations



SOCIAL HISTORY

Never Assessed



REASON FOR VISIT

phone call



PLAN OF CARE





VITAL SIGNS





MEDICATIONS

Unknown Medications



RESULTS

No Results



PROCEDURES

No Known procedures



INSTRUCTIONS





MEDICATIONS ADMINISTERED

No Known Medications



MEDICAL (GENERAL) HISTORY







 Type  Description  Date

 

 Medical History  coronary artery disease   

 

 Medical History  Carotid stenosis   

 

 Medical History  hypertension   

 

 Medical History  hyperlipidemia   

 

 Medical History  mitral valve regurgitation   

 

 Medical History  vitamin D deficiency   

 

 Medical History  nystagmus   

 

 Medical History  chest pain syndrome/palpitations   

 

 Medical History  ataxia/unsteady gait   

 

 Medical History  TIAs   

 

 Medical History  stress test 2012 EF 81%; 2014 EF 78%   

 

 Medical History  echo 2012 EF 60% mild MR,TR, AV stenosis; 2014 EF 60% 
aortic regurgitatio, MR, TR   

 

 Medical History  carotid duplex 2012 <40% Bilat; 2014 <40% bilat   

 

 Medical History  cataracts   

 

 Surgical History  left cataract surgery  2018

 

 Hospitalization History  falls   

 

 Hospitalization History  left hip pain, age-indeterminate pubic rami fracture--
[facility]  16

## 2018-12-31 NOTE — XMS REPORT
[facility]

 Created on: 2018



Kely Landis

External Reference #: [id number]

: 1935

Sex: Female



Demographics







 Address  [address] 

[place]  [address]

 

 Preferred Language  Unknown

 

 Marital Status  Unknown

 

 Mormonism Affiliation  Unknown

 

 Race  Unknown

 

 Ethnic Group  Unknown





Author







 Author  JAYRO SEO

 

 Organization  [facility]

 

 Address  [address] [place]  [address]



 

 Phone  [phone]







Care Team Providers







 Care Team Member Name  Role  Phone

 

 JAYRO SEO  Unavailable  [phone]







PROBLEMS







 Type  Condition  ICD9-CM Code  RRH00-SA Code  Onset Dates  Condition Status  
SNOMED Code

 

 Problem  Hypertension     I10     Active  41847813

 

 Problem  Dementia in other diseases classified elsewhere without behavioral 
disturbance     F02.80     Active  081452484

 

 Problem  Ataxia     R27.0     Active  41120893

 

 Problem  Hyperlipidemia     E78.5     Active  14772783

 

 Problem  Delusional disorder     F22     Active  31523390

 

 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 
condition     F28     Active  24466013

 

 Problem  Psychosis, unspecified psychosis type     F29     Active  57647489

 

 Problem  Alzheimers disease with late onset     G30.1     Active  73742336

 

 Problem  Unspecified dementia without behavioral disturbance     F03.90     
Active  73225856

 

 Problem  Hallucinations     R44.3     Active  3570578







ALLERGIES

No Known Allergies



ENCOUNTERS







 Encounter  Location  Date  Diagnosis

 

 [facility]  [address] [phone]-
[address]  11 Sep, 2018   

 

 [facility] [place] [facility]  [id number] [place] [phone]
[phone]  11 Sep, 2018  Unspecified injury of left ankle, initial encounter 
S99.912A and Unspecified injury of left foot, initial encounter S99.922A

 

 [facility]  [id number] [place] [address]151-
2092  04 Sep, 2018  Hallucinations R44.3

 

 [facility]  [address] [address]-
5595  09 Aug, 2018   

 

 [facility]  [address] [address]-
8748  08 Aug, 2018  Hallucinations R44.3 ; Hypertension I10 and Toe pain, right 
M79.674

 

 [facility]  [id number] [place] [address] 20639-
4505    Hallucinations R44.3 ; Alzheimers disease with late onset 
G30.1 and Toe pain, right M79.674

 

 [facility]  [id number] [place] [address] 87335-
0059     

 

 [facility]  [id number] [place] [address] 62312-
8695    Hallucinations R44.3

 

 [facility]  [id number] [place] [address] 23890-
7017     

 

 [facility]  [id number] [place] [address] 71919-
7985    Delusional disorder F22 and Psychosis, unspecified 
psychosis type F29

 

 [facility]  [id number] [place] [address] 02961-
8512     

 

 [facility]  [id number] [place] [address] 39159-
5545     

 

 [facility]  [id number] [place] [address] 01646-
3733  22 May, 2018  Local infection of the skin and subcutaneous tissue, 
unspecified L08.9 and Other injury of unspecified body region, initial 
encounter T14.8XXA

 

 [facility]  [id number] [place] [address] 93250-
5317  17 May, 2018   

 

 [facility]  [id number] [place] [address] 62790-
5884  10 May, 2018   

 

 [facility]  [id number] [place] [address] 42868-
5785    Hallucinations R44.3

 

 [facility]  [id number] [place] [address] 42569-
9406  19 Mar, 2018  Psychosis, unspecified psychosis type F29

 

 [facility]  [id number] [place] [address] 17710-
7409  15 Mar, 2018   

 

 [facility]  [address] [address]-
2872  13 Mar, 2018   

 

 [facility]  [id number] [place] [address] 62540-
7237    Unspecified dementia without behavioral disturbance F03.90 
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 
condition F28

 

 [facility]  [id number] [place] [address] 27261-
9502     

 

 [facility]  [id number] [place] [address] 96401-
1624     

 

 [facility]  [id number] [place] [address] 07309-
5350     

 

 [facility]  [id number] [place] [address] 40272-
2361    Dementia, unspecified, without behavioral disturbance F03.90

 

 [facility]  [address] [address]-
3790  06 Dec, 2017  Medicare annual wellness visit, initial Z00.00 and 
Encounter for immunization Z23

 

 [place] [id number] [place] [address] 11603-
1999    Ataxia R27.0 and Hallucinations R44.3

 

 [place] [id number] [place] [address] 92454-
5230     

 

 [facility]  [id number] [place] [address] 89045-
7758  12 Oct, 2017  Encounter for immunization Z23

 

 [facility]  [address] [place] [address] 26478-
2953  11 Sep, 2017  Hallucinations R44.3

 

 [facility]  [id number] [place] [address] 08680-
1211  05 Sep, 2017  Hallucinations R44.3

 

 [facility]  [id number] [place] [address] 60535-
0283  30 Aug, 2017  Arthralgia of left knee M25.562 ; Age-related nuclear 
cataract of both eyes H25.13 and Hallucinations R44.3

 

 [facility]  [address] [address]-
7268  24 Aug, 2017   

 

 [facility]  [address] 01558-
1060  14 Aug, 2017   

 

 [facility]  [address] [address]65-
0221    Hyperlipidemia E78.5 and Hypertension I10

 

 [place] [address] [address]-
3988    Hyperlipidemia E78.5 and Hypertension I10

 

 [place] [address] [address]656-
6420     

 

 [facility]  [address] [address]-
6539  15 Feb, 2017  Hypertension I10 ; Alzheimers disease with late onset G30.1 
; Dementia in other diseases classified elsewhere without behavioral 
disturbance F02.80 and Ataxia R27.0

 

 [place] [address] [address]-
6488     

 

 [place] [address] [address]-
3110     

 

 [facility]  [address] [address]-
9350    Hypertension I10 and Ataxia R27.0

 

 [facility]  [id number] [place] [address] 97513-
6478  14 Oct, 2016   

 

 [facility]  [address] [address]-
9116  26 Sep, 2016   

 

 [facility]  [address] [address]-
4364  23 Sep, 2016   

 

 [facility]  [address] [address]-
9988  23 Sep, 2016   

 

 [facility]  [address] [place] [address] 99899-
3251  20 Sep, 2016   

 

 [facility]  [id number] [place] [address] 83061-
0018  16 Sep, 2016   

 

 [facility]  [id number] [place] [address] 88389
-[phone]  13 Aug, 2016  Urinary frequency R35.0 and Acute cystitis without 
hematuria N30.00

 

 [facility]  [id number] [place] [address] 30726-
6531     

 

 [facility]  [id number] [place] [address] 25894-
2957     

 

 [facility]  [id number] [place] [address] 17112-
9942  11 Mar, 2016  Hyperlipidemia E78.5

 

 [place] [id number] [place] [address] 91760-
3803  10 Mar, 2016  Ataxia R27.0 ; Hyperlipidemia E78.5 and Hypertension I10

 

 [facility]  [id number] [place] [address] 30593-
4383     

 

 [facility]  [id number] [place] [address] 47936-
9018    Ataxia R27.0 ; Senile cataracts of both eyes H25.9 and 
Constipation, unspecified constipation type K59.00

 

 [facility]  [address] [place] [address] 76629-
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

 

 [facility]  [id number] [place] [address] 48289-
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

 

 [facility]  [id number] [place] [address] 69828-
9884  21 Aug, 2015  Dementia 294.20

 

 [facility]  [address] [place] [address]-
4642  13 Aug, 2015  Establishing care with new doctor, encounter for V65.8 ; 
Ataxia 781.3 ; Horizontal nystagmus 379.56 ; Hypertension 401.9 ; 
Hyperlipidemia 272.4 and History of TIA (transient ischemic attack) V12.54

 

 [facility]  [address] [facility] [id number] [address]-
8438  06 Aug, 2015   

 

 [facility]  [address] [facility] [id number] [address]-
3227  05 Aug, 2015   







IMMUNIZATIONS

No Known Immunizations



SOCIAL HISTORY

Never Assessed



REASON FOR VISIT

left foot pain since saturday am. was using her walker et fell inside the 
house. teresita



PLAN OF CARE







 Activity  Details









  









 Follow Up  pending xray results Reason:left ankle injury







VITAL SIGNS







 Height  64 in  2018

 

 Weight  116.2 lbs  2018

 

 Temperature  98.7 degrees Fahrenheit  2018

 

 Heart Rate  90 bpm  2018

 

 Respiratory Rate  20   2018

 

 BMI  19.94 kg/m2  2018

 

 Blood pressure systolic  126 mmHg  2018

 

 Blood pressure diastolic  78 mmHg  2018







MEDICATIONS







 Medication  Instructions  Dosage  Frequency  Start Date  End Date  Duration  
Status

 

 Baby Aspirin 81 MG  Orally Once a day     24h           Active

 

 Lexapro 10 mg  Orally Once a day  1 tablet  24h       90 days  
Active

 

 Vitamin D 1000 UNIT  Orally Once a day  1 tablet  24h           Active

 

 Fish Oil 1000 MG  Orally Twice a day  1 capsule  12h           Active

 

 Seroquel XR 50 mg  Orally Once a day  1 tablet at bedtime  24h  08 Aug, 2018  
   30 days  Active







RESULTS







 Name  Result  Date  Reference Range

 

 Xray : Ankle, Left, 3 views (IN HOUSE)     2018   

 

 Xray : Foot, Left 3 views (IN HOUSE)     2018   







PROCEDURES







 Procedure  Date Ordered  Result  Body Site

 

 X-RAY EXAM OF ANKLE  2018      

 

 X-RAY EXAM OF FOOT  2018      

 

 [facility] VISIT ESTABLISHED PATIENT  2018      







INSTRUCTIONS





MEDICATIONS ADMINISTERED

No Known Medications



MEDICAL (GENERAL) HISTORY







 Type  Description  Date

 

 Medical History  coronary artery disease   

 

 Medical History  Carotid stenosis   

 

 Medical History  hypertension   

 

 Medical History  hyperlipidemia   

 

 Medical History  mitral valve regurgitation   

 

 Medical History  vitamin D deficiency   

 

 Medical History  nystagmus   

 

 Medical History  chest pain syndrome/palpitations   

 

 Medical History  ataxia/unsteady gait   

 

 Medical History  TIAs   

 

 Medical History  stress test 2012 EF 81%; 2014 EF 78%   

 

 Medical History  echo 2012 EF 60% mild MR,TR, AV stenosis; 2014 EF 60% 
aortic regurgitatio, MR, TR   

 

 Medical History  carotid duplex 2012 <40% Bilat; 2014 <40% bilat   

 

 Medical History  cataracts   

 

 Surgical History  left cataract surgery  2018

 

 Hospitalization History  falls   

 

 Hospitalization History  left hip pain, age-indeterminate pubic rami fracture--
[facility]  16

## 2018-12-31 NOTE — XMS REPORT
[facility]

 Created on: 2018



Boby Kely

External Reference #: [id number]

: 1935

Sex: Female



Demographics







 Address  [address] [place]  [address]

 

 Preferred Language  Unknown

 

 Marital Status  Unknown

 

 Restorationist Affiliation  Unknown

 

 Race  Unknown

 

 Ethnic Group  Unknown





Author







 Author  ROBBI ENCISO

 

 Organization  [facility] [place]

 

 Address  [address] [place]  [address]



 

 Phone  [phone]







Care Team Providers







 Care Team Member Name  Role  Phone

 

 ROBBI ENCISO  Unavailable  [phone]







PROBLEMS







 Type  Condition  ICD9-CM Code  CDP77-RR Code  Onset Dates  Condition Status  
SNOMED Code

 

 Problem  Hypertension     I10     Active  77852683

 

 Problem  Dementia in other diseases classified elsewhere without behavioral 
disturbance     F02.80     Active  269783512

 

 Problem  Ataxia     R27.0     Active  95180865

 

 Problem  Hyperlipidemia     E78.5     Active  28539798

 

 Problem  Delusional disorder     F22     Active  94709835

 

 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 
condition     F28     Active  59231109

 

 Problem  Psychosis, unspecified psychosis type     F29     Active  11622688

 

 Problem  Alzheimers disease with late onset     G30.1     Active  50021551

 

 Problem  Unspecified dementia without behavioral disturbance     F03.90     
Active  22607926

 

 Problem  Hallucinations     R44.3     Active  6205967







ALLERGIES

No Known Allergies



ENCOUNTERS







 Encounter  Location  Date  Diagnosis

 

 [place] [address] [place] [id number] [place] [address]-
9142  04 Sep, 2018   

 

 [place] [address] [place] [address]-
0907  09 Aug, 2018   

 

 [facility] [place]  [id number] [place] [address]-
1897  08 Aug, 2018  Hallucinations R44.3 ; Hypertension I10 and Toe pain, right 
M79.674

 

 [facility] [place]  [id number] [place] [address]-
9179    Hallucinations R44.3 ; Alzheimers disease with late onset 
G30.1 and Toe pain, right M79.674

 

 [facility] [place]  [id number], [place] [address]     

 

 [facility] [place]  [id number] [address]  19 2018  Hallucinations R44.3

 

 [facility] [place]  [id number] [address]  14 2018   

 

 [facility] [place]  [id number] [address]13    Delusional disorder F22 and Psychosis, unspecified 
psychosis type F29

 

 [facility] [place]  [address], [place] [address]-
[address]     

 

 [facility] [place]  [address], [place] [address]-
[address]     

 

 [facility] [place]  [address] [place] [address]-
8666  22 May, 2018  Local infection of the skin and subcutaneous tissue, 
unspecified L08.9 and Other injury of unspecified body region, initial 
encounter T14.8XXA

 

 [facility] [place]  [address], [place] [address]-
76[address]  17 May, 2018   

 

 [facility] [place]  [address] [place] [address]-
9979  10 May, 2018   

 

 [facility] [place]  [address], [place] [address]-
0516    Hallucinations R44.3

 

 [facility] [place]  [address] [address]-
6532  19 Mar, 2018  Psychosis, unspecified psychosis type F29

 

 [facility] [place]  [address], [place] [address]-
3388  15 Mar, 2018   

 

 [facility] [place]  [id number] [address]-
0236  13 Mar, 2018   

 

 [facility] [place]  [address], [place] [address]-
[address]    Unspecified dementia without behavioral disturbance F03.90 
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 
condition F28

 

 [facility] [place]  [id number] [place] [address]-
8711     

 

 [facility] [place]  [id number] [place] [address]-
6529     

 

 [facility] [place]  [id number] [place] [address] 11987-
1726     

 

 [facility] [place]  [address] [address]-
[address]    Dementia, unspecified, without behavioral disturbance F03.90

 

 [facility] [place]  [id number] [place] [address]-
7034  06 Dec, 2017  Encounter for immunization Z23 and Medicare annual wellness 
visit, initial Z00.00

 

 [place] [id number] [place] [address] 80797-
7096    Ataxia R27.0 and Hallucinations R44.3

 

 [place] [address] [address]-
1106     

 

 [facility] [place]  [address] [place] [address]-
6687  12 Oct, 2017  Encounter for immunization Z23

 

 [facility] [place]  [id number] [place] [address]-
8232  11 Sep, 2017  Hallucinations R44.3

 

 [place] [address] [place] [address] 89551-
5701  05 Sep, 2017  Hallucinations R44.3

 

 [facility] [place]  [address] [place] [address] 33077-
1051  30 Aug, 2017  Arthralgia of left knee M25.562 ; Age-related nuclear 
cataract of both eyes H25.13 and Hallucinations R44.3

 

 [facility] [place]  [id number] [place] [address] 16[address]-
6582  24 Aug, 2017   

 

 [facility] [place]  [id number] [place] [address]43-
8604  14 Aug, 2017   

 

 [place] [address], [place] [address]-
[address]6  19 2017  Hyperlipidemia E78.5 and Hypertension I10

 

 [facility] [place]  [id number] [place] [address]-
9185  16 2017  Hyperlipidemia E78.5 and Hypertension I10

 

 [facility] [place]  [id number] [place] [address]-
1426     

 

 [facility] [place]  [address] [place] [address]-
9594  15 2017  Hypertension I10 ; Alzheimers disease with late onset G30.1 
; Dementia in other diseases classified elsewhere without behavioral 
disturbance F02.80 and Ataxia R27.0

 

 [facility] [place]  [address] [place] [address] 785[address]-
8221  14 2016   

 

 [facility] [place]  [address] [place] [address] 249[address]-
4016  08 2016   

 

 [facility] [place]  [address] [place] [address] 68358-
8858    Hypertension I10 and Ataxia R27.0

 

 [facility] [place]  [address] [place] [address] 81865-
0467  14 Oct, 2016   

 

 [facility] [place]  [address] [place] [address] 75486-
6269  26 Sep, 2016   

 

 [facility] [place]  [address] [place] [address] 96776-
2534  23 Sep, 2016   

 

 [facility] [place]  [address] [place] [address] 58430-
2470  23 Sep, 2016   

 

 [facility] [place]  [address] [place] [address]-
8799  20 Sep, 2016   

 

 [facility] [place]  [address] [place] [address] 91277-
9262  16 Sep, 2016   

 

 [facility]  [address] [place] [address] [phone]
-4180  13 Aug, 2016  Urinary frequency R35.0 and Acute cystitis without 
hematuria N30.00

 

 [place] [address] [place] [address]-
7739     

 

 [facility] [place]  [address] [place] [address] 08858-
7295     

 

 [place] [address] [address]-
3663  11 Mar, 2016  Hyperlipidemia E78.5

 

 [place] [address] [address]-
1302  10 Mar, 2016  Ataxia R27.0 ; Hyperlipidemia E78.5 and Hypertension I10

 

 [place] [address] [place] [address] 89[address]-
5286     

 

 [place] [address] [address] [place] [address] 09581-
0886    Ataxia R27.0 ; Senile cataracts of both eyes H25.9 and 
Constipation, unspecified constipation type K59.00

 

 [place] [address] [place] [address] 14866-
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

 

 [place] [address] [place] [address] 07563-
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

 

 [place] [address] [place] [address] 09976-
5285  21 Aug, 2015  Dementia 294.20

 

 [place] [address] [place] [address] 44162-
1424  13 Aug, 2015  Establishing care with new doctor, encounter for V65.8 ; 
Ataxia 781.3 ; Horizontal nystagmus 379.56 ; Hypertension 401.9 ; 
Hyperlipidemia 272.4 and History of TIA (transient ischemic attack) V12.54

 

 [place] [address] [place] [address]20-
3200  06 Aug, 2015   

 

 [facility] [place]  [address] [facility] [id number] [place] [address]-
9785  05 Aug, 2015   







IMMUNIZATIONS

No Known Immunizations



SOCIAL HISTORY

Never Assessed



REASON FOR VISIT

foot infection, right 4th toe swollen and draining x 6 months----Melissa, 6 
weeks ago she dropped walker on wound and has been increasingly painful, was 
seen at [facility] urgent care yesterday was given a pill and a cream and was told to f
/u with PCP, filled meds at [facility]







 Activity  Details









  









 Follow Up  1 Week Reason:w/ PCP







VITAL SIGNS







 Height  64 in  2018

 

 Weight  113 lbs  2018

 

 Temperature  97.6 degrees Fahrenheit  2018

 

 Heart Rate  80 bpm  2018

 

 Respiratory Rate  20   2018

 

 BMI  19.39 kg/m2  2018

 

 Blood pressure systolic  128 mmHg  2018

 

 Blood pressure diastolic  88 mmHg  2018







MEDICATIONS







 Medication  Instructions  Dosage  Frequency  Start Date  End Date  Duration  
Status

 

 Lexapro 10 mg  Orally Once a day  1/2 tablet  24h  19 Mar, 2018        Not-
Taking

 

 Indomethacin 50 mg  Orally Three times a day  1 capsule with food or milk  8h 
          Active

 

 Bactrim -160 MG  Orally Twice a day  1 tablet  12h           Active

 

 Fish Oil 1000 MG  Orally Twice a day  1 capsule  12h           Active

 

 Vitamin D 1000 UNIT  Orally Once a day  1 tablet  24h           Active

 

 Mupirocin 2 %  Externally 2 times a day  1 application to affected area  12h  
         Active







RESULTS







 Name  Result  Date  Reference Range

 

 Xray : Foot, Right 3 views (IN HOUSE)     2018   







PROCEDURES







 Procedure  Date Ordered  Result  Body Site

 

 X-RAY EXAM OF FOOT  May 22, 2018      

 

 [facility] VISIT ESTABLISHED PATIENT  May 22, 2018      







INSTRUCTIONS





MEDICATIONS ADMINISTERED

No Known Medications



MEDICAL (GENERAL) HISTORY







 Type  Description  Date

 

 Medical History  coronary artery disease   

 

 Medical History  Carotid stenosis   

 

 Medical History  hypertension   

 

 Medical History  hyperlipidemia   

 

 Medical History  mitral valve regurgitation   

 

 Medical History  vitamin D deficiency   

 

 Medical History  nystagmus   

 

 Medical History  chest pain syndrome/palpitations   

 

 Medical History  ataxia/unsteady gait   

 

 Medical History  TIAs   

 

 Medical History  stress test 2012 EF 81%; 2014 EF 78%   

 

 Medical History  echo 2012 EF 60% mild MR,TR, AV stenosis; 2014 EF 60% 
aortic regurgitatio, MR, TR   

 

 Medical History  carotid duplex 2012 <40% Bilat; 2014 <40% bilat   

 

 Medical History  cataracts   

 

 Surgical History  left cataract surgery  2018

 

 Hospitalization History  falls   

 

 Hospitalization History  left hip pain, age-indeterminate pubic rami fracture--
[facility]  16

## 2018-12-31 NOTE — XMS REPORT
[facility]

 Created on: 2018



Landis Kely

External Reference #: [id number]

: 1935

Sex: Female



Demographics







 Address  [address] [place]  [address]

 

 Preferred Language  Unknown

 

 Marital Status  Unknown

 

 Restoration Affiliation  Unknown

 

 Race  Unknown

 

 Ethnic Group  Unknown





Author







 Author  OIDN GAUTHIER

 

 Organization  [facility]

 

 Address  [address] [place]  [address]



 

 Phone  [phone]







Care Team Providers







 Care Team Member Name  Role  Phone

 

 ODIN GAUTHIER  Unavailable  [phone]







PROBLEMS







 Type  Condition  ICD9-CM Code  RJI44-JW Code  Onset Dates  Condition Status  
SNOMED Code

 

 Problem  Hypertension     I10     Active  35916463

 

 Problem  Dementia in other diseases classified elsewhere without behavioral 
disturbance     F02.80     Active  733008629

 

 Problem  Ataxia     R27.0     Active  61117600

 

 Problem  Hyperlipidemia     E78.5     Active  40655902

 

 Problem  Delusional disorder     F22     Active  72666192

 

 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 
condition     F28     Active  14371669

 

 Problem  Psychosis, unspecified psychosis type     F29     Active  04742800

 

 Problem  Alzheimers disease with late onset     G30.1     Active  69841335

 

 Problem  Unspecified dementia without behavioral disturbance     F03.90     
Active  55505382

 

 Problem  Hallucinations     R44.3     Active  3600166







ALLERGIES

No Information



ENCOUNTERS







 Encounter  Location  Date  Diagnosis

 

 [place] [address] [address]-
54[address]  04 Sep, 2018   

 

 [place] [address] [address]-
8711  09 Aug, 2018   

 

 [place] [address] [address]-
8731  08 Aug, 2018  Hallucinations R44.3 ; Hypertension I10 and Toe pain, right 
M79.674

 

 [place] [address] [address]-
2783    Hallucinations R44.3 ; Alzheimers disease with late onset 
G30.1 and Toe pain, right M79.674

 

 [place] [address] [place] [id number] [place] [address]8544-
8324     

 

 [place] [address] [address]
5693  19 2018  Hallucinations R44.3

 

 [facility]  [id number] [place] [address] 81100-
4448  14 2018   

 

 [facility]  [id number] [place] [address] 10192-
8145  13 2018  Delusional disorder F22 and Psychosis, unspecified 
psychosis type F29

 

 [facility]  [id number] [place] [address] 86806-
9888     

 

 [facility]  [address] [place] [address] 32823-
3706     

 

 [facility]  [address] [place] [address] 42155-
1768  22 May, 2018  Local infection of the skin and subcutaneous tissue, 
unspecified L08.9 and Other injury of unspecified body region, initial 
encounter T14.8XXA

 

 [facility]  [id number] [address]
4862  17 May, 2018   

 

 [facility]  [address] [place] [address] 07579-
0845  10 May, 2018   

 

 [facility]  [address] [place] [address] 88778-
7077    Hallucinations R44.3

 

 [facility]  [address] [place] [address] 29900-
0199  19 Mar, 2018  Psychosis, unspecified psychosis type F29

 

 [facility]  [address] [address]-
3001  15 Mar, 2018   

 

 [facility]  [address] [place] [address] 85424-
9064  13 Mar, 2018   

 

 [facility]  [address] [address]-
8685    Unspecified dementia without behavioral disturbance F03.90 
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 
condition F28

 

 [facility]  [address] [place] [address] 12943-
0216     

 

 [facility]  [address] [address]-
9470     

 

 [facility]  [address] [place] [address] [place] [address] 16856-
9080     

 

 [facility]  [address] [address]-
3667    Dementia, unspecified, without behavioral disturbance F03.90

 

 [place] [address] [place] [address] [place] [address] 77396-
2651  06 Dec, 2017  Encounter for immunization Z23 and Medicare annual wellness 
visit, initial Z00.00

 

 [place] [address] [place] [address] [place] [address] 86670-
6951    Ataxia R27.0 and Hallucinations R44.3

 

 [place] [address] [place] [address] [place] [address] 11699-
9466     

 

 [place] [address] [place] [address] [place] [address] 44578-
5829  12 Oct, 2017  Encounter for immunization Z23

 

 [place] [address] [place] [address] [place] [address] 59295-
8339  11 Sep, 2017  Hallucinations R44.3

 

 [place] [address] [place] [address] [place] [address] 64894-
1358  05 Sep, 2017  Hallucinations R44.3

 

 [place] [address] [place] [address] [place] [address] 60414-
7584  30 Aug, 2017  Arthralgia of left knee M25.562 ; Age-related nuclear 
cataract of both eyes H25.13 and Hallucinations R44.3

 

 [facility]  [address] [place] [address] [place] [address] 85706-
9204  24 Aug, 2017   

 

 [place] [address] [place] [address] [place] [address] 69878-
3051  14 Aug, 2017   

 

 [facility]  [address] [place] [address] [place] [address] 95117-
9525    Hyperlipidemia E78.5 and Hypertension I10

 

 [facility]  [address] [place] [address] [place] [address] 16459-
5090    Hyperlipidemia E78.5 and Hypertension I10

 

 [facility]  [address] [place] [address] [place] [address] 80366-
4162     

 

 [facility]  [address] [place] [address] [address] [place] [address] 06931-
1371  15 Feb, 2017  Hypertension I10 ; Alzheimers disease with late onset G30.1 
; Dementia in other diseases classified elsewhere without behavioral 
disturbance F02.80 and Ataxia R27.0

 

 [facility]  [address] [place] [address] 49104-
2104     

 

 [facility]  [address] [place] [address] [place] [address] 05124-
0759     

 

 [facility]  [address] [place] [address] [place] [address] 82034-
6665    Hypertension I10 and Ataxia R27.0

 

 [facility]  [address] [place] [address] [place] [address] 02649-
3574  14 Oct, 2016   

 

 [facility]  [address] [place] [address] [place] [address] 81010-
2424  26 Sep, 2016   

 

 [facility]  [address] [place] [address] [place] [address] 42397-
9245  23 Sep, 2016   

 

 [facility]  [address] [place] [address] [place] [address] 31792-
3750  23 Sep, 2016   

 

 [facility]  [address] [place] [address] [place] [address] 24266-
9365  20 Sep,    

 

 [facility]  [address] [place] [address] [place] [address] 63077-
4418  16 Sep, 2016   

 

 [facility]  [address] [place] [address] [place] [address] 69203
-[phone]  13 Aug, 2016  Urinary frequency R35.0 and Acute cystitis without 
hematuria N30.00

 

 [place] [address] [address]679-
5283     

 

 [facility]  [address] [address]-
0070     

 

 [place] [address] [address]65-
1885  11 Mar, 2016  Hyperlipidemia E78.5

 

 [place] [address] [address]-
9914  10 Mar, 2016  Ataxia R27.0 ; Hyperlipidemia E78.5 and Hypertension I10

 

 [place] [address] 86713-
6242     

 

 [place] [address] [address]-
9475    Ataxia R27.0 ; Senile cataracts of both eyes H25.9 and 
Constipation, unspecified constipation type K59.00

 

 [place] [address] [address]-
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

 

 [place] [address] [address]-
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

 

 [place] [address] [address]782-
1002  21 Aug, 2015  Dementia 294.20

 

 [place] [address] [address]-
6442  13 Aug, 2015  Establishing care with new doctor, encounter for V65.8 ; 
Ataxia 781.3 ; Horizontal nystagmus 379.56 ; Hypertension 401.9 ; 
Hyperlipidemia 272.4 and History of TIA (transient ischemic attack) V12.54

 

 [place] [address] [address]-
9985  06 Aug, 2015   

 

 [facility]  [address] [facility] [id number] [place] [address]-
5302  05 Aug, 2015   







IMMUNIZATIONS

No Known Immunizations



SOCIAL HISTORY

Never Assessed



REASON FOR VISIT

Requests return call from Provider



PLAN OF CARE





VITAL SIGNS





MEDICATIONS

Unknown Medications



RESULTS

No Results



PROCEDURES

No Known procedures



INSTRUCTIONS





MEDICATIONS ADMINISTERED

No Known Medications



MEDICAL (GENERAL) HISTORY







 Type  Description  Date

 

 Medical History  coronary artery disease   

 

 Medical History  Carotid stenosis   

 

 Medical History  hypertension   

 

 Medical History  hyperlipidemia   

 

 Medical History  mitral valve regurgitation   

 

 Medical History  vitamin D deficiency   

 

 Medical History  nystagmus   

 

 Medical History  chest pain syndrome/palpitations   

 

 Medical History  ataxia/unsteady gait   

 

 Medical History  TIAs   

 

 Medical History  stress test 2012 EF 81%; 2014 EF 78%   

 

 Medical History  echo 2012 EF 60% mild MR,TR, AV stenosis; 2014 EF 60% 
aortic regurgitatio, MR, TR   

 

 Medical History  carotid duplex 2012 <40% Bilat; 2014 <40% bilat   

 

 Medical History  cataracts   

 

 Surgical History  left cataract surgery  2018

 

 Hospitalization History  falls   

 

 Hospitalization History  left hip pain, age-indeterminate pubic rami fracture--
[facility]  16

## 2018-12-31 NOTE — XMS REPORT
[facility]

 Created on: 2018



Boby Kely

External Reference #: [id number]

: 1935

Sex: Female



Demographics







 Address  [address] [place]  [address]

 

 Preferred Language  Unknown

 

 Marital Status  Unknown

 

 Mormon Affiliation  Unknown

 

 Race  Unknown

 

 Ethnic Group  Unknown





Author







 Author  ODIN GAUTHIER

 

 Organization  [facility]

 

 Address  [address] [place]  [address]



 

 Phone  [phone]







Care Team Providers







 Care Team Member Name  Role  Phone

 

 ODIN GAUTHIER  Unavailable  [phone]







PROBLEMS







 Type  Condition  ICD9-CM Code  DLJ30-GW Code  Onset Dates  Condition Status  
SNOMED Code

 

 Problem  Hypertension     I10     Active  26484345

 

 Problem  Dementia in other diseases classified elsewhere without behavioral 
disturbance     F02.80     Active  243741276

 

 Problem  Ataxia     R27.0     Active  91034717

 

 Problem  Hyperlipidemia     E78.5     Active  86781091

 

 Problem  Delusional disorder     F22     Active  63630997

 

 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 
condition     F28     Active  52879334

 

 Problem  Psychosis, unspecified psychosis type     F29     Active  56525881

 

 Problem  Alzheimers disease with late onset     G30.1     Active  04842479

 

 Problem  Unspecified dementia without behavioral disturbance     F03.90     
Active  56203396

 

 Problem  Hallucinations     R44.3     Active  4044576







ALLERGIES

No Known Allergies



ENCOUNTERS







 Encounter  Location  Date  Diagnosis

 

 [facility]  [address] [place] [id number] [place] [address] 71054-
4506  11 Sep, 2018   

 

 [facility]  [id number] [place] [address] 91399
-8807  11 Sep, 2018  Unspecified injury of left ankle, initial encounter 
S99.912A and Unspecified injury of left foot, initial encounter S99.922A

 

 [facility]  [address] [address]-
9573  04 Sep, 2018  Hallucinations R44.3

 

 [facility]  [address] [place] [id number] [place] [address] 88761-
1720  09 Aug, 2018   

 

 [facility]  [address] [id number] [place] [address] 51679-
0580  08 Aug, 2018  Hallucinations R44.3 ; Hypertension I10 and Toe pain, right 
M79.674

 

 [facility]  [address] [place] [id number] [place] [address] 71496-
3386    Hallucinations R44.3 ; Alzheimers disease with late onset 
G30.1 and Toe pain, right M79.674

 

 [facility]  [address] [id number] [place] [address] 46328-
7632     

 

 [facility]  [address] [address]-
8620    Hallucinations R44.3

 

 [facility]  [address] [place] [id number] [place] [address] 78605-
0301     

 

 [facility]  [address] [place] [id number] [place] [address] 39330-
0273    Delusional disorder F22 and Psychosis, unspecified 
psychosis type F29

 

 [facility]  [address] [id number] [place] [address] 26797-
6626     

 

 [facility]  [id number] [place] [address] 73254-
6559     

 

 [facility]  [address] [id number] [place] [address] 37330-
8613  22 May, 2018  Local infection of the skin and subcutaneous tissue, 
unspecified L08.9 and Other injury of unspecified body region, initial 
encounter T14.8XXA

 

 [facility]  [address] [place] [id number] [place] [address] 68200-
2365  17 May, 2018   

 

 [facility]  [id number] [place] [address] 38655-
0381  10 May, 2018   

 

 [facility]  [address] [id number] [place] [address] 24776-
4584    Hallucinations R44.3

 

 [facility]  [id number] [place] [address] 85029-
4469  19 Mar, 2018  Psychosis, unspecified psychosis type F29

 

 [facility]  [address] [place] [id number] [place] [address] 58868-
5665  15 Mar, 2018   

 

 [facility]  [address] [address]-
3192  13 Mar, 2018   

 

 [facility]  [address] [address]237-
0216    Unspecified dementia without behavioral disturbance F03.90 
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 
condition F28

 

 [facility]  [address] [id number] [place] [address] 04558-
2497     

 

 [facility]  [address] [address]-
5141     

 

 [facility]  [address] [place] [address] 77337-
9505     

 

 [facility]  [address] [id number] [place] [address] 03389-
7440    Dementia, unspecified, without behavioral disturbance F03.90

 

 [facility]  [address] [address]-
2565  06 Dec, 2017  Medicare annual wellness visit, initial Z00.00 and 
Encounter for immunization Z23

 

 [place] [address] [place] [address] 89200-
5242    Ataxia R27.0 and Hallucinations R44.3

 

 [place] [address] [address]-
6053     

 

 [facility]  [id number] [place] [address] 61018-
3261  12 Oct, 2017  Encounter for immunization Z23

 

 [facility]  [address] [place] [address] 95550-
9153  11 Sep, 2017  Hallucinations R44.3

 

 [facility]  [id number] [place] [address] 56680-
0443  05 Sep, 2017  Hallucinations R44.3

 

 [facility]  [id number] [place] [address] 85966-
1761  30 Aug, 2017  Arthralgia of left knee M25.562 ; Age-related nuclear 
cataract of both eyes H25.13 and Hallucinations R44.3

 

 [facility]  [address] [address]-
2358  24 Aug, 2017   

 

 [facility]  [id number] [place] [address] 83524-
7512  14 Aug, 2017   

 

 [facility]  [address] 7[address]-
0810    Hyperlipidemia E78.5 and Hypertension I10

 

 [place] [address] [address]-
9313    Hyperlipidemia E78.5 and Hypertension I10

 

 [place] [address] [place] [id number] [place] [address] 07221-
4968     

 

 [facility]  [address] 07125-
3005  15 Feb, 2017  Hypertension I10 ; Alzheimers disease with late onset G30.1 
; Dementia in other diseases classified elsewhere without behavioral 
disturbance F02.80 and Ataxia R27.0

 

 [facility]  [id number] [place] [address] 40334-
4980     

 

 [facility]  [id number] [place] [address] 75739-
9362     

 

 [facility]  [address] [address]-
0229    Hypertension I10 and Ataxia R27.0

 

 [facility]  [id number] [place] [address] 52045-
6925  14 Oct, 2016   

 

 [facility]  [address] 46852-
9459  26 Sep, 2016   

 

 [facility]  [id number] [place] [address] 43922-
6483  23 Sep, 2016   

 

 [facility]  [address] 19783-
0538  23 Sep, 2016   

 

 [facility]  [address] [place] [address] 02441-
9743  20 Sep, 2016   

 

 [facility]  [id number] [place] [address] 11197-
2642  16 Sep, 2016   

 

 [facility]  [id number] [place] [address] 59584
-5309  13 Aug, 2016  Urinary frequency R35.0 and Acute cystitis without 
hematuria N30.00

 

 [facility]  [id number] [place] [address] 38725-
3879     

 

 [facility]  [id number] [place] [address] 61146-
2122     

 

 [facility]  [id number] [place] [address] 99161-
8772  11 Mar, 2016  Hyperlipidemia E78.5

 

 [place] [address] [place] [id number] [place] [address] 01627-
5394  10 Mar, 2016  Ataxia R27.0 ; Hyperlipidemia E78.5 and Hypertension I10

 

 [facility]  [address] [id number] [place] [address] 18434-
7985     

 

 [facility]  [address] [place] [address] 03118-
6661    Ataxia R27.0 ; Senile cataracts of both eyes H25.9 and 
Constipation, unspecified constipation type K59.00

 

 [facility]  [address] [place] [address] 30554-
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

 

 [place] [address] [place] [id number] [place] [address] 98629-
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

 

 [facility]  [id number] [place] [address] 16292-
6873  21 Aug, 2015  Dementia 294.20

 

 [facility]  [id number] [place] [address]-
3372  13 Aug, 2015  Establishing care with new doctor, encounter for V65.8 ; 
Ataxia 781.3 ; Horizontal nystagmus 379.56 ; Hypertension 401.9 ; 
Hyperlipidemia 272.4 and History of TIA (transient ischemic attack) V12.54

 

 [facility]  [address] [facility] [id number] [place] [address]-
4726  06 Aug, 2015   

 

 [facility]  [address] [facility] [id number] [address]-
0960  05 Aug, 2015   







IMMUNIZATIONS

No Known Immunizations



SOCIAL HISTORY

Never Assessed



REASON FOR VISIT

Med f/u, PT reports no concerns today -Jesus EDWARDS 



PLAN OF CARE







 Activity  Details









  









 Follow Up  4 Weeks Reason:hallucinations







VITAL SIGNS







 Height  64 in  2018

 

 Weight  116.2 lbs  2018

 

 Temperature  97.9 degrees Fahrenheit  2018

 

 Heart Rate  83 bpm  2018

 

 Respiratory Rate  18   2018

 

 Oximetry  97 %  2018

 

 BMI  19.94 kg/m2  2018

 

 Blood pressure systolic  120 mmHg  2018

 

 Blood pressure diastolic  62 mmHg  2018







MEDICATIONS







 Medication  Instructions  Dosage  Frequency  Start Date  End Date  Duration  
Status

 

 Vitamin D 1000 UNIT  Orally Once a day  1 tablet  24h           Active

 

 Seroquel XR 50 mg  Orally Once a day  1 tablet at bedtime  24h  08 Aug, 2018  
   30 days  Active

 

 Baby Aspirin 81 MG  Orally Once a day     24h           Active

 

 Fish Oil 1000 MG  Orally Twice a day  1 capsule  12h           Active

 

 Lexapro 10 mg  Orally Once a day  1 tablet  24h       90 days  
Active







RESULTS

No Results



PROCEDURES







 Procedure  Date Ordered  Result  Body Site

 

 Replaced by [facility] VISIT ESTABLISHED PATIENT  Aug 08, 2018      







INSTRUCTIONS





MEDICATIONS ADMINISTERED

No Known Medications



MEDICAL (GENERAL) HISTORY







 Type  Description  Date

 

 Medical History  coronary artery disease   

 

 Medical History  Carotid stenosis   

 

 Medical History  hypertension   

 

 Medical History  hyperlipidemia   

 

 Medical History  mitral valve regurgitation   

 

 Medical History  vitamin D deficiency   

 

 Medical History  nystagmus   

 

 Medical History  chest pain syndrome/palpitations   

 

 Medical History  ataxia/unsteady gait   

 

 Medical History  TIAs   

 

 Medical History  stress test 2012 EF 81%; 2014 EF 78%   

 

 Medical History  echo 2012 EF 60% mild MR,TR, AV stenosis; 2014 EF 60% 
aortic regurgitatio, MR, TR   

 

 Medical History  carotid duplex 2012 <40% Bilat; 2014 <40% bilat   

 

 Medical History  cataracts   

 

 Surgical History  left cataract surgery  2018

 

 Hospitalization History  falls   

 

 Hospitalization History  left hip pain, age-indeterminate pubic rami fracture--
[facility]  16

## 2018-12-31 NOTE — ED GI
General


Chief Complaint:  Abdominal/GI Problems


Stated Complaint:  V/D


Source of Information:  Patient, EMS


Exam Limitations:  Physical Impairments





History of Present Illness


Date Seen by Provider:  Dec 31, 2018


Time Seen by Provider:  07:07


Initial Comments


Here with report of nausea, vomiting and diarrhea.  Onset sometime this 

morning.  EMS was activated for the persistent vomiting.  Patient is elderly 

and lives with her disabled son who has hearing impairment.  She was seen in 

the hospital a couple weeks ago for multiple rib fractures.  Patient reports 

vomiting.  EMS did establish IV and gave Zofran 4 mg IV which helped a little 

bit but still nauseated currently.  Denies significant pain at this point.


Timing/Duration:  12 Hours


Severity/Quality:  Moderate, Cramping


Location:  Generalized Abdomen


Radiation:  No Radiation


Activities at Onset:  None


Modifying Factors:  Worsens With Eating; Improves With Vomiting


Associated Symptoms:  No Back Pain, No Chest Pain, No Fever/Chills; Nausea/

Vomiting; No Shortness of Air; Weakness





Allergies and Home Medications


Allergies


Coded Allergies:  


     No Known Drug Allergies (Verified , 10/7/16)





Home Medications


Acetaminophen 650 Mg Tablet.er, 650 MG PO Q8H


   Prescribed by: STEW KNOWLES on 18 1443


Aspirin 81 Mg Tablet.dr, 81 MG PO DAILY, (Reported)


Escitalopram Oxalate 10 Mg Tablet, 10 MG PO DAILY, (Reported)


Omega 3 Polyunsat Fatty Acids 1,000 Mg Cap, 1,000 MG PO DAILY, (Reported)


Quetiapine Fumarate 50 Mg Tab.er.24h, 50 MG PO HS, (Reported)





Patient Home Medication List


Home Medication List Reviewed:  Yes





Review of Systems


Review of Systems


Constitutional:  see HPI; No chills, No fever


EENTM:  No Symptoms Reported


Respiratory:  Denies Cough, Denies Shortness of Air


Cardiovascular:  No Symptoms Reported


Gastrointestinal:  See HPI, Abdominal Pain, Diarrhea, Nausea, Vomiting


Genitourinary:  No Symptoms Reported


Musculoskeletal:  No back pain; muscle weakness


Skin:  no symptoms reported


Psychiatric/Neurological:  Denies Headache; Weakness





All Other Systems Reviewed


Negative Unless Noted:  Yes





Past Medical-Social-Family Hx


Past Med/Social Hx:  Reviewed Nursing Past Med/Soc Hx


Patient Social History


Alcohol Use:  Denies Use


Recreational Drug Use:  No


Smoking Status:  Never a Smoker


Recent Hopitalizations:  No


Physical Abuse:  No


Sexual Abuse:  No


Mistreated:  No


Fear:  No





Immunizations Up To Date


Tetanus Booster (TDap):  Unknown


PED Vaccines UTD:  No


Date of Pneumonia Vaccine:  Oct 1, 2017


Date of Influenza Vaccine:  Oct 1, 2017





Seasonal Allergies


Seasonal Allergies:  No





Past Medical History


Surgeries:  No


Respiratory:  No


Currently Using CPAP:  No


Currently Using BIPAP:  No


Cardiac:  Yes (SEES DR. BUSBY UNSURE WHY)


Hypertension


Neurological:  Yes


Dementia


Reproductive Disorders:  No


Female Reproductive Disorders:  Denies


Sexually Transmitted Disease:  No


HIV/AIDS:  No


Genitourinary:  Yes


UTI-Chronic


Gastrointestinal:  No


Musculoskeletal:  Yes


Fractures, Gout


Endocrine:  Yes


Hyperthyroidism


HEENT:  Yes


Cataract


Loss of Vision:  Denies


Hearing Impairment:  Hard of Hearing


Cancer:  No


Psychosocial:  No


Integumentary:  No


Recent Skin Changes


Blood Disorders:  No


Adverse Reaction/Blood Tranf:  No





Family Medical History


Reviewed Nursing Family Hx





Dementia


  G8 SISTER


Diabetes mellitus


  19 FATHER


No Pertinent Family Hx, Diabetes, Psychiatric Problems





Physical Exam


Vital Signs





Vital Signs - First Documented








 18





 07:38


 


Temp 99.8


 


Pulse 89


 


Resp 16


 


B/P (MAP) 198/71 (113)


 


Pulse Ox 100





Capillary Refill :


Height/Weight/BMI


Height: 5'4.00"


Weight: 112lbs. 6.0oz. 50.768566ck; 20.1 BMI


Method:Stated


General Appearance:  WD/WN, no apparent distress


Neck:  full range of motion, supple


Respiratory:  lungs clear, normal breath sounds


Cardiovascular:  regular rate, rhythm, no murmur


Gastrointestinal:  normal bowel sounds, non tender, soft


Extremities:  non-tender, normal inspection


Back:  normal inspection, no CVA tenderness, no vertebral tenderness


Neurologic/Psychiatric:  alert, oriented x 3


Skin:  normal color, warm/dry





Focused Exam


Lactate Level


18 12:48: Lactic Acid Level 2.62*H





Lactic Acid Level





Laboratory Tests








Test


 18


12:48


 


Lactic Acid Level


 2.62 MMOL/L


(0.50-2.00)  *H











Progress/Results/Core Measures


Results/Orders


Lab Results





Laboratory Tests








Test


 18


07:28 18


12:48 Range/Units


 


 


White Blood Count


 12.9 H


 


 4.3-11.0


10^3/uL


 


Red Blood Count


 3.96 L


 


 4.35-5.85


10^6/uL


 


Hemoglobin 11.8   11.5-16.0  G/DL


 


Hematocrit 36   35-52  %


 


Mean Corpuscular Volume 90   80-99  FL


 


Mean Corpuscular Hemoglobin 30   25-34  PG


 


Mean Corpuscular Hemoglobin


Concent 33 


 


 32-36  G/DL





 


Red Cell Distribution Width 15.1 H  10.0-14.5  %


 


Platelet Count


 226 


 


 130-400


10^3/uL


 


Mean Platelet Volume 9.3   7.4-10.4  FL


 


Neutrophils (%) (Auto) 81 H  42-75  %


 


Lymphocytes (%) (Auto) 14   12-44  %


 


Monocytes (%) (Auto) 5   0-12  %


 


Eosinophils (%) (Auto) 0   0-10  %


 


Basophils (%) (Auto) 0   0-10  %


 


Neutrophils # (Auto) 10.5 H  1.8-7.8  X 10^3


 


Lymphocytes # (Auto) 1.8   1.0-4.0  X 10^3


 


Monocytes # (Auto) 0.6   0.0-1.0  X 10^3


 


Eosinophils # (Auto)


 0.0 


 


 0.0-0.3


10^3/uL


 


Basophils # (Auto)


 0.0 


 


 0.0-0.1


10^3/uL


 


Urine Color YELLOW    


 


Urine Clarity CLEAR    


 


Urine pH 8   5-9  


 


Urine Specific Gravity 1.015 L  1.016-1.022  


 


Urine Protein 1+ H  NEGATIVE  


 


Urine Glucose (UA) NEGATIVE   NEGATIVE  


 


Urine Ketones NEGATIVE   NEGATIVE  


 


Urine Nitrite NEGATIVE   NEGATIVE  


 


Urine Bilirubin NEGATIVE   NEGATIVE  


 


Urine Urobilinogen NORMAL   NORMAL  MG/DL


 


Urine Leukocyte Esterase NEGATIVE   NEGATIVE  


 


Urine RBC (Auto) NEGATIVE   NEGATIVE  


 


Urine RBC 0-2    /HPF


 


Urine WBC NONE    /HPF


 


Urine Crystals NONE    /LPF


 


Urine Bacteria NEGATIVE    /HPF


 


Urine Casts NONE    /LPF


 


Urine Mucus NEGATIVE    /LPF


 


Urine Culture Indicated NO    


 


Sodium Level 140   135-145  MMOL/L


 


Potassium Level 3.2 L  3.6-5.0  MMOL/L


 


Chloride Level 101     MMOL/L


 


Carbon Dioxide Level 20 L  21-32  MMOL/L


 


Anion Gap 19 H  5-14  MMOL/L


 


Blood Urea Nitrogen 29 H  7-18  MG/DL


 


Creatinine


 1.04 


 


 0.60-1.30


MG/DL


 


Estimat Glomerular Filtration


Rate 51 


 


  





 


BUN/Creatinine Ratio 28    


 


Glucose Level 158 H    MG/DL


 


Calcium Level 9.5   8.5-10.1  MG/DL


 


Corrected Calcium 9.5   8.5-10.1  MG/DL


 


Total Bilirubin 0.8   0.1-1.0  MG/DL


 


Aspartate Amino Transf


(AST/SGOT) 25 


 


 5-34  U/L





 


Alanine Aminotransferase


(ALT/SGPT) 24 


 


 0-55  U/L





 


Alkaline Phosphatase 107     U/L


 


Troponin I < 0.30   <0.30  NG/ML


 


C-Reactive Protein High


Sensitivity 0.35 


 


 0.00-0.50


MG/DL


 


Total Protein 7.3   6.4-8.2  GM/DL


 


Albumin 4.0   3.2-4.5  GM/DL


 


Lactic Acid Level


 


 2.62 *H


 0.50-2.00


MMOL/L








Micro Results





Microbiology


18 Influenza Types A,B Antigen (BOB) - Final, Complete


           





My Orders





Orders - NARENDRA ROSAS MD


Cbc With Automated Diff (18 07:20)


Comprehensive Metabolic Panel (18 07:20)


Hs C Reactive Protein (18 07:20)


Ua Culture If Indicated (18 07:20)


Ondansetron Injection (Zofran Injectio (18 07:30)


Ns Iv 1000 Ml (Sodium Chloride 0.9%) (18 07:20)


Saline Lock/Iv-Start (18 07:20)


Chest 1 View, Ap/Pa Only (18 07:20)


Fecal Occult Bedside (18 07:20)


Ekg Tracing (18 07:20)


Troponin I (18 07:20)


Ct Head Wo (18 09:22)


Dysphagia Screening Tool (18 09:54)


Lactic Acid Analyzer (18 12:42)


Blood Culture (18 12:42)


Influenza A And B Antigens (18 12:42)


Acetaminophen  Tablet (Tylenol  Tablet) (18 13:45)


Zosyn 4.5gm Iv X 1 (18 14:15)





Medications Given in ED





Current Medications








 Medications  Dose


 Ordered  Sig/Familia


 Route  Start Time


 Stop Time Status Last Admin


Dose Admin


 


 Acetaminophen  1,000 mg  ONCE  ONCE


 PO  18 13:45


 18 13:46 DC 18 13:49


1,000 MG


 


 Ondansetron HCl  4 mg  ONCE  ONCE


 IVP  18 07:30


 18 07:33 DC 18 07:38


4 MG








Vital Signs/I&O











 18





 07:38


 


Temp 99.8


 


Pulse 89


 


Resp 16


 


B/P (MAP) 198/71 (113)


 


Pulse Ox 100











Progress


Progress Note :  


Progress Note


Seen and evaluated.  IV by EMS.  Repeat Zofran 4 mg IV, normal saline 1 L bolus

, labs, UA and EKG ordered.  Hemoccult stool ordered.  This was faintly 

positive.  Monitor patient.  0930: On nursing talking with patient's daughter.  

Daughter reports that the patient had increasing weakness overnight and we are 

not sure what that's about.  We will get CT of the head due to patient's mental 

status declined.  0945: Son arrives and states that patient was doing okay 

yesterday.  He noted a 3 a.m. that the patient wasn't acting right but she went 

back to sleep.  At around 630 this morning, patient was definitely still not 

acting right son stated that she did not look right.  Ultimately EMS was called 

and patient was transported to the emergency department.  Due to additional 

story, we will get NIH and dysphagia screen.  Patient is well outside of window 

for TPA as her last known well time was last night prior to 10 p.m.  She is 

still having diarrhea currently.  Labs, x-ray and EKG otherwise had no 

significant findings.  She is not better with fluids.  Monitor patient.  1355: 

Patient does have elevated lactic acid.  Normal saline 1 L bolus is been 

initiated.  I have spoke at length with the patient's family including her 

daughter who is in [place].  We have tried to come up with options for care for 

her.  Lactic acid is noted to be elevated now and this changes or concern.  Due 

to the altered mental status overnight that may have been from the Seroquel, we'

re concerned about aspiration as a possible cause of the pneumonia as well as 

other intra-abdominal pathology.  We will initiate her on Zosyn 4.5 g IV and 

admit her for further care including fluids and antibiotics.  All this was 

discussed with the patient and family who agree.  Social work has been 

consulted on her as she will likely need placement afterwards due to her 

decline in function and persistent and increasing weakness.


Initial ECG Impression Date:  Dec 31, 2018


Initial ECG Impression Time:  07:55


Initial ECG Rate:  83


Initial ECG Rhythm:  Normal Sinus


Comment


Sinus rhythm with artifact.  PVC noted.  Normal axis.  No evidence of ST 

elevation MI.  T-wave abnormality in the lateral leads.  Overall unchanged from 

10 May 2015.  Interpreted by me.





Diagnostic Imaging





   Diagonstic Imaging:  Xray


   Plain Films/CT/US/NM/MRI:  chest


Comments


NAME:   DARRION SINCLAIR


[facility] REC#:   [id number]


ACCOUNT#:   [id number]


PT STATUS:   REG ER


:   1935


PHYSICIAN:   NARENDRA ROSAS MD


ADMIT DATE:   18/ER


 ***Draft***


Date of Exam:18





CHEST 1 VIEW, AP/PA ONLY








INDICATION: Not provided





COMPARISON: 2018





FINDINGS: Single frontal view of the chest demonstrates normal


heart size and pulmonary vascularity. The lungs are well aerated


and clear. No large pleural effusion or pneumothorax is seen. The


visualized osseous structures show no acute abnormalities. Note


is made of aortic atherosclerosis.





IMPRESSION: 


1. No acute cardiopulmonary process.  








  Dictated on workstation # [id number]








Dict:   18 0758


Trans:   18 0802


[facility] [phone]





Interpreted by:     ASHU SAINI MD


Electronically signed by:








   Diagonstic Imaging:  CT


   Plain Films/CT/US/NM/MRI:  head


Comments


 [facility].


 [place], [place]





NAME:   DARRION SINCLAIR [facility] REC#:   [id number]


ACCOUNT#:   [id number]


PT STATUS:   REG ER


:   1935


PHYSICIAN:   NARENDRA ROSAS MD


ADMIT DATE:   18/ER


 ***Draft***


Date of Exam:18





CT HEAD WO








PROCEDURE: CT head without contrast.





TECHNIQUE: Multiple contiguous axial images were obtained through


the brain without the use of intravenous contrast.





DATE: 2018.





COMPARISON: CT head and cervical spine 2018. MRI


brain May 11, 2015.





INDICATION: 83-year-old female, confusion and altered mental


status.





FINDINGS: 





There are motion limitations of the exam. The visualized portions


of the paranasal sinuses, mastoid air cells and middle ears


appear well aerated. There are areas of low attenuation in the


periventricular and subcortical white matter which are


nonspecific but most likely reflect mild/moderate changes of


chronic small vessel ischemic disease.  The ventricles and


cerebral spinal fluid spaces are of normal size and configuration


for the patient's age. There is no mass effect or midline shift.


There is no acute intracranial hemorrhage. There is no abnormal


extra-axial fluid collection.     





IMPRESSION: 


1. No identified acute intracranial abnormality.


2. Mild to moderate changes of chronic small vessel ischemic


disease. 








  Dictated on workstation # [id number]








Dict:   18 0946


Trans:   18 0951


[facility] [phone]





Interpreted by:     ANTELMO LALA MD


Electronically signed by:





Departure


Communication (Admissions)


Time/Spoke to Admitting Phy:  13:55





Impression





 Primary Impression:  


 Fever, unknown origin


 Additional Impressions:  


 Dehydration


 Lactic acidosis


Disposition:   ADMITTED AS INPATIENT


Condition:  Stable





Admissions


Decision to Admit Reason:  Admit from ER (General)


Decision to Admit/Date:  Dec 31, 2018


Time/Decision to Admit Time:  13:55





Departure-Patient Inst.


Referrals:  


NI HUMMEL MD (PCP/Family)


Primary Care Physician











NARENDRA ROSAS MD Dec 31, 2018 07:57

## 2018-12-31 NOTE — XMS REPORT
[facility]

 Created on: 2018



LandisAdwoaKely

External Reference #: [id number]

: 1935

Sex: Female



Demographics







 Address  [address] [place]  [address]

 

 Preferred Language  Unknown

 

 Marital Status  Unknown

 

 Amish Affiliation  Unknown

 

 Race  Unknown

 

 Ethnic Group  Unknown





Author







 Author  ODIN GAUTHIER

 

 Organization  [facility]

 

 Address  [address] [place]  [address]



 

 Phone  [phone]







Care Team Providers







 Care Team Member Name  Role  Phone

 

 ODIN GAUTHIER  Unavailable  [phone]







PROBLEMS







 Type  Condition  ICD9-CM Code  XQT85-IT Code  Onset Dates  Condition Status  
SNOMED Code

 

 Problem  Hypertension     I10     Active  11759756

 

 Problem  Dementia in other diseases classified elsewhere without behavioral 
disturbance     F02.80     Active  842710783

 

 Problem  Ataxia     R27.0     Active  86188108

 

 Problem  Hyperlipidemia     E78.5     Active  80581908

 

 Problem  Delusional disorder     F22     Active  26942457

 

 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 
condition     F28     Active  95669233

 

 Problem  Psychosis, unspecified psychosis type     F29     Active  31490527

 

 Problem  Alzheimers disease with late onset     G30.1     Active  33245564

 

 Problem  Unspecified dementia without behavioral disturbance     F03.90     
Active  54223747

 

 Problem  Hallucinations     R44.3     Active  7997736







ALLERGIES

No Information



ENCOUNTERS







 Encounter  Location  Date  Diagnosis

 

 [place] [address] [address]  04 Sep, 2018   

 

 [place] [address] [address]
6617  09 Aug, 2018   

 

 [place] [address] [address]-
4206  08 Aug, 2018  Hallucinations R44.3 ; Hypertension I10 and Toe pain, right 
M79.674

 

 [place] [address] [address]-
0868    Hallucinations R44.3 ; Alzheimers disease with late onset 
G30.1 and Toe pain, right M79.674

 

 [place] [address] [place] [id number] [place] [address]-
[phone]     

 

 [place] [address], [place] [address]
0923  19 2018  Hallucinations R44.3

 

 [facility]  [address] [id number] [place] [address]-
[phone]  14 2018   

 

 [facility]  [address] [place] [id number] [place] [address]-
[phone]  13 2018  Delusional disorder F22 and Psychosis, unspecified 
psychosis type F29

 

 [facility]  [address] [place] [id number] [place] [address]-
1425     

 

 [facility]  [address] [place] [id number] [place] [address]-
1506     

 

 [facility]  [address] [place] [id number] [place] [address]-
0742  22 May, 2018  Local infection of the skin and subcutaneous tissue, 
unspecified L08.9 and Other injury of unspecified body region, initial 
encounter T14.8XXA

 

 [facility]  [id number] [address]
8558  17 May, 2018   

 

 [facility]  [address] [place] [id number] [place] [address]-
4054  10 May, 2018   

 

 [facility]  [address] [place] [id number] [place] [address]371-
[phone]    Hallucinations R44.3

 

 [facility]  [address] [place] [address]-
[phone]  19 Mar, 2018  Psychosis, unspecified psychosis type F29

 

 [facility]  [address], [place] [address]
5212  15 Mar, 2018   

 

 [facility]  [address] [place] [id number] [place] [address]-
1990  13 Mar, 2018   

 

 [facility]  [address], [place] [address]-
3174    Unspecified dementia without behavioral disturbance F03.90 
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 
condition F28

 

 [facility]  [address] [place] [id number] [place] [address] 18851-
0828     

 

 [facility]  [address] [address]-
6677     

 

 [facility]  [address] [place] [id number] [place] [address] 31083-
8009     

 

 [facility]  [address] [address]-
3832    Dementia, unspecified, without behavioral disturbance F03.90

 

 [place] [address] [place] [id number] [place] [address]698-
8709  06 Dec, 2017  Encounter for immunization Z23 and Medicare annual wellness 
visit, initial Z00.00

 

 [place] [address] [place] [id number] [place] [address]-
0921    Ataxia R27.0 and Hallucinations R44.3

 

 [place] [address] [place] [id number] [place] [address] 89452-
6664     

 

 [place] [address] [place] [id number] [place] [address]28-
2294  12 Oct, 2017  Encounter for immunization Z23

 

 [place] [address] [place] [id number] [place] [address] 81345-
0414  11 Sep, 2017  Hallucinations R44.3

 

 [place] [address] [place] [id number] [place] [address] 16508-
8656  05 Sep, 2017  Hallucinations R44.3

 

 [place] [address] [place] [id number] [place] [address]-
1044  30 Aug, 2017  Arthralgia of left knee M25.562 ; Age-related nuclear 
cataract of both eyes H25.13 and Hallucinations R44.3

 

 [facility]  [address] [place] [id number] [place] [address] 67682-
7953  24 Aug, 2017   

 

 [place] [address] [place] [id number] [place] [address] 25567-
9383  14 Aug, 2017   

 

 [facility]  [address] [place] [id number] [place] [address] 90367-
4232    Hyperlipidemia E78.5 and Hypertension I10

 

 [facility]  [address] [place] [id number] [place] [address] 25[address]-
2780    Hyperlipidemia E78.5 and Hypertension I10

 

 [facility]  [address] [place] [id number] [place] [address] 8[address]-
1960     

 

 [facility]  [address] [place] [id number] [place] [address]-
2157  15 Feb, 2017  Hypertension I10 ; Alzheimers disease with late onset G30.1 
; Dementia in other diseases classified elsewhere without behavioral 
disturbance F02.80 and Ataxia R27.0

 

 [facility]  [address] [place] [id number] [place] [address] 90567-
0350     

 

 [facility]  [address] [place] [id number] [place] [address] 51616-
6510     

 

 [facility]  [address] [place] [id number] [place] [address]105-
0760    Hypertension I10 and Ataxia R27.0

 

 [facility]  [address] [place] [id number] [place] [address] 98047-
6960  14 Oct, 2016   

 

 [facility]  [address] [place] [id number] [place] [address] 18951-
6566  26 Sep, 2016   

 

 [facility]  [address] [place] [id number] [place] [address] 42232-
1144  23 Sep, 2016   

 

 [facility]  [address] [place] [id number] [place] [address] 34934-
7931  23 Sep, 2016   

 

 [facility]  [address] [place] [id number] [place] [address] 84895-
9425  20 Sep,    

 

 [facility]  [address] [place] [id number] [place] [address]34-
1970  16 Sep, 2016   

 

 [facility]  [address] [place] [id number] [place] [address] [phone]
[phone]  13 Aug, 2016  Urinary frequency R35.0 and Acute cystitis without 
hematuria N30.00

 

 [place] [address] [address]-
3396     

 

 [facility]  [address] [address]-
5061     

 

 [place] [address] [address]-
1908  11 Mar, 2016  Hyperlipidemia E78.5

 

 [place] [address] [address]-
8965  10 Mar, 2016  Ataxia R27.0 ; Hyperlipidemia E78.5 and Hypertension I10

 

 [place] [address] [address]-
2125     

 

 [place] [address] [address]-
5860    Ataxia R27.0 ; Senile cataracts of both eyes H25.9 and 
Constipation, unspecified constipation type K59.00

 

 [place] [address] [address]-
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

 

 [place] [address] [address]-
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

 

 [place] [address] [address]-
3615  21 Aug, 2015  Dementia 294.20

 

 [place] [address] [address]-
6798  13 Aug, 2015  Establishing care with new doctor, encounter for V65.8 ; 
Ataxia 781.3 ; Horizontal nystagmus 379.56 ; Hypertension 401.9 ; 
Hyperlipidemia 272.4 and History of TIA (transient ischemic attack) V12.54

 

 [place] [address] [address]-
8910  06 Aug, 2015   

 

 [facility]  [address] [facility] [id number] [place] [address]-
3513  05 Aug, 2015   







IMMUNIZATIONS

No Known Immunizations



SOCIAL HISTORY

Never Assessed



REASON FOR VISIT

Home Health Order Request



PLAN OF CARE





VITAL SIGNS





MEDICATIONS

Unknown Medications



RESULTS

No Results



PROCEDURES

No Known procedures



INSTRUCTIONS





MEDICATIONS ADMINISTERED

No Known Medications



MEDICAL (GENERAL) HISTORY







 Type  Description  Date

 

 Medical History  coronary artery disease   

 

 Medical History  Carotid stenosis   

 

 Medical History  hypertension   

 

 Medical History  hyperlipidemia   

 

 Medical History  mitral valve regurgitation   

 

 Medical History  vitamin D deficiency   

 

 Medical History  nystagmus   

 

 Medical History  chest pain syndrome/palpitations   

 

 Medical History  ataxia/unsteady gait   

 

 Medical History  TIAs   

 

 Medical History  stress test 2012 EF 81%; 2014 EF 78%   

 

 Medical History  echo 2012 EF 60% mild MR,TR, AV stenosis; 2014 EF 60% 
aortic regurgitatio, MR, TR   

 

 Medical History  carotid duplex 2012 <40% Bilat; 2014 <40% bilat   

 

 Medical History  cataracts   

 

 Surgical History  left cataract surgery  2018

 

 Hospitalization History  falls   

 

 Hospitalization History  left hip pain, age-indeterminate pubic rami fracture--
[facility]  16

## 2018-12-31 NOTE — NUR
DARRION SINCLAIR admitted to room 409-1, with an admitting diagnosis of 
INFECTION AND FEVER OF UNKNOWN ORIGIN, LACTIC ACIDOSIS, on 12/31/18 from ED via , 
accompanied by STAFF. DARRION SINCLAIR introduced to surroundings, call light, 
bed controls, phone, TV, temperature control, lights, meal times, smoking policy, visitor 
policy, side rail policy, bathrooms and showers.  Patient Rights given to patient in the 
handbook. DARRION SINCLAIR verbalizes understanding that [facility] is not 
responsible for the loss or damage to any personal effects or valuables that are kept in the 
patients posession during their hospitalization. DARRION SINCLAIR verbalizes 
understanding of Interdisciplinary Patient Education. Patient and/or family were informed 
about the Rapid Response Team and its purpose.

## 2018-12-31 NOTE — NUR
CM/SS.  Responded to consult in ER and then patient was admitted.



Familiar with patient and family from previous admissions.  Patient was just here approx 2 
weeks ago for fall with fractured ribs.  She was OBS stay and did not meet criteria for IP.  
She has Medicare, a supplement, and Medicaid with a spend down of approx $5800 which 
prevented her from entering a community nursing home at that time.



Spoke at length with patient's daughter/POA Nikki Fraser in [place].  Will monitor patient 
progress and see what is needed post acute care.  Nikki is aware to explore options 
including in-home assistance, assisted living, or nursing home for long term permanent 
placement.



Patient resides at home with her son John Lance and has for many years.  He is deaf 
but functions within normal limits otherwise.



Followup Wednesday.

## 2019-01-01 NOTE — HISTORY & PHYSICAL-HOSPITALIST
History of Present Illness


HPI/Chief Complaint


CC: Weakness with fever and elevated lactic acid





HPI: This is an 84-year-old white female clinic patient of [facility] 

resides at home with her son who presented to the ER with altered mental status 

and fatigue and significant drowsiness likely due to just starting Seroquel the 

night before..  Patient was found to have elevated lactic acid with fever and 

leukocytosis she was placed on empiric antibiotics IV fluids and monitor 

closely for supportive care.  Patient is very frail and likely needs nursing 

home placement.  Patient at this current time feels much better she is eating 

and drinking and has no pain reported.  I have restarted all of her home 

medication and will be replacing potassium with IV fluids.  Checked meds and 

labs.


Source:  patient, RN/MD


Exam Limitations:  no limitations


Date Seen


1/1/19


Time Seen by a Provider:  10:00


Attending Physician


Rachelle Hewitt DO


PCP


Joss Roland MD


Referring Physician





Date of Admission


Dec 31, 2018 at 14:08





Home Medications & Allergies


Home Medications


Reviewed patient Home Medication Reconciliation performed by pharmacy 

medication reconciliations technician and/or nursing.


Patients Allergies have been reviewed.





Allergies





Allergies


Coded Allergies


  quetiapine (Verified Adverse Reaction, Unknown, 12/31/18)


    confusion








Past Medical-Social-Family Hx


Past Med/Social Hx:  Reviewed Nursing Past Med/Soc Hx, Reviewed and Corrections 

made


Patient Social History


Marrital Status:  single


Employed/Student:  retired


Alcohol Use:  Denies Use


Number of Drinks Today:  AA


Recreational Drug Use:  No


Smoking Status:  Never a Smoker


Physical Abuse Screen:  No


Sexual Abuse:  No


Recent Foreign Travel:  No


Contact w/other who traveled:  No


Recent Hopitalizations:  No


Recent Infectious Disease Expo:  No





Immunizations Up To Date


Tetanus Booster (TDap):  Unknown


Pediatric:  No


Date of Pneumonia Vaccine:  Oct 1, 2017


Date of Influenza Vaccine:  Oct 1, 2017





Seasonal Allergies


Seasonal Allergies:  No





Past Medical History


Currently Using CPAP:  No


Currently Using BIPAP:  No


Cardiac:  Hypertension


Neurological:  Dementia


Reproductive:  No


Sexually Transmitted Disease:  No


HIV/AIDS:  No


Female Reproductive Disorders:  Denies


Genitourinary:  UTI-Chronic


Musculoskeletal:  Fractures, Gout


Endocrine:  Hyperthyroidism


HEENT:  Cataract


Loss of Vision:  Denies


Hearing Impairment:  Hard of Hearing


Skin/Integumentary:  Recent Skin Changes


History of Blood Disorders:  No


Adverse Reaction to Blood English:  No





Family History


Reviewed Nursing Family Hx





Dementia


  G8 SISTER


Diabetes mellitus


  19 FATHER


No Pertinent Family Hx, Diabetes, Psychiatric Problems





Review of Systems


Constitutional:  see HPI, malaise, weakness, weight loss


EENTM:  no symptoms reported


Respiratory:  no symptoms reported


Cardiovascular:  no symptoms reported


Gastrointestinal:  no symptoms reported


Genitourinary:  no symptoms reported


Musculoskeletal:  no symptoms reported


Skin:  no symptoms reported


Psychiatric/Neurological:  No Symptoms Reported





Physical Exam


Physical Exam


Vital Signs





Vital Signs - First Documented








 12/31/18





 07:38


 


Temp 99.8


 


Pulse 89


 


Resp 16


 


B/P (MAP) 198/71 (113)


 


Pulse Ox 100





Capillary Refill : Less Than 3 Seconds


Height, Weight, BMI


Height: 5'4.00"


Weight: 113lbs. 8.0oz. 51.886719bx; 19.5 BMI


Method:Stated


General Appearance:  No Apparent Distress, WD/WN, Chronically ill, Thin


Eyes:  Bilateral Eye Normal Inspection, Bilateral Eye PERRL


HEENT:  PERRL/EOMI, Normal ENT Inspection, Pharynx Normal


Neck:  Full Range of Motion, Normal Inspection, Non Tender, Supple, Carotid 

Bruit


Respiratory:  Chest Non Tender, Lungs Clear, Normal Breath Sounds, No Accessory 

Muscle Use, No Respiratory Distress


Cardiovascular:  Regular Rate, Rhythm, No Edema, No Gallop, No JVD, No Murmur, 

Normal Peripheral Pulses


Gastrointestinal:  Normal Bowel Sounds, No Organomegaly, No Pulsatile Mass, Non 

Tender, Soft


Back:  Normal Inspection, No CVA Tenderness, No Vertebral Tenderness


Extremity:  Normal Capillary Refill, Normal Inspection, Normal Range of Motion, 

Non Tender, No Calf Tenderness, No Pedal Edema


Neurologic/Psychiatric:  Alert, Oriented x3 (Subtle poor recall), No Motor/

Sensory Deficits, Normal Mood/Affect


Skin:  Normal Color, Warm/Dry


Lymphatic:  No Adenopathy





Results


Results/Procedures


Labs


Laboratory Tests


12/31/18 07:28








1/1/19 04:55








Patient resulted labs reviewed.





Assessment/Plan


Admission Diagnosis


Assessment:


Altered mental status likely due to Seroquel


Fever with elevated lactic acid and leukocytosis


Dementia


Weight loss


Frail status


Advanced age


Depression





Plan:


Restart all home meds except for cervical


Maintain IV fluids


Replace potassium and IV fluids


Monitor closely


May need nursing home placement


Admission Status:  Inpatient Order (span 2 midnights)


Reason for Inpatient Admission:  


Patient will require IVF and close monitoring for source of infection and


will require 3 days





Diagnosis/Problems


Diagnosis/Problems





(1) Fever, unknown origin


Status:  Acute


(2) Lactic acidosis


Status:  Acute


(3) Dehydration


Status:  Acute


(4) Advanced age


Status:  Acute


(5) Debility


Status:  Acute


(6) Hypertension


Status:  Chronic


(7) Hyperlipidemia


Status:  Chronic


(8) Anemia


Status:  Chronic


Qualifiers:  


   Anemia type:  unspecified type  Qualified Codes:  D64.9 - Anemia, unspecified


(9) Depression


Status:  Chronic


Qualifiers:  


   Depression Type:  unspecified  Qualified Codes:  F32.9 - Major depressive 

disorder, single episode, unspecified


(10) Hypokalemia


Status:  Acute


(11) Leukocytosis


Status:  Acute


Qualifiers:  


   Leukocytosis type:  leukemoid reaction  Qualified Codes:  D72.823 - 

Leukemoid reaction


(12) Altered mental status


Status:  Acute


Qualifiers:  


   Altered mental status type:  stupor  Qualified Codes:  R40.1 - Stupor


(13) Side effect of medication


Status:  Acute





Clinical Quality Measures


DVT/VTE Risk/Contraindication:


Risk Factor Score Per Nursing:  3


RFS Level Per Nursing on Admit:  3=High











RACHELLE HEWITT DO Jan 1, 2019 12:17

## 2019-01-02 NOTE — PHYSICAL THERAPY EVALUATION
PT Evaluation-General


Medical Diagnosis


Admission Date


Dec 31, 2018 at 14:08


Medical Diagnosis:  weakness


Onset Date:  Dec 31, 2018





Therapy Diagnosis


Therapy Diagnosis:  impaired mobility, strength, endurance, balance





Height/Weight


Height (Feet):  5


Height (Inches):  4.00


Weight (Pounds):  113


Weight (Ounces):  8.0





Precautions


Precautions/Isolations:  Fall Prevention, Standard Precautions





Referral


Physician:  Rachelle Hewitt DO


Reason for Referral:  Evaluation/Treatment





Medical History


Pertinent Medical History:  Arthritis, Dementia, HTN


Additional Medical History


Past Medical History


Currently Using CPAP:  No


Currently Using BIPAP:  No


Cardiac:  Hypertension


Neurological:  Dementia


Reproductive:  No


Sexually Transmitted Disease:  No


HIV/AIDS:  No


Female Reproductive Disorders:  Denies


Genitourinary:  UTI-Chronic


Musculoskeletal:  Fractures, Gout


Endocrine:  Hyperthyroidism


HEENT:  Cataract


Loss of Vision:  Denies


Hearing Impairment:  Hard of Hearing


Skin/Integumentary:  Recent Skin Changes


History of Blood Disorders:  No


Adverse Reaction to Blood English:  No


Reviewed History:  Yes





Social History


Home:  Single Level


Entry Into Home:  Stairs Without Railing


PT Steps Into Home:  1


Patient states her son lives with her.





Prior/Core FIM


Prior Level of Function


Therapy Code Descriptions/Definitions 





Functional [place] Measure:


0=Not Assessed/NA        4=Minimal Assistance


1=Total Assistance        5=Supervision or Setup


2=Maximal Assistance  6=Modified [place]


3=Moderate Assistance 7=Complete [place]








Therapy Quality Codes:


6    Independent with activity with or without an assistive device


5    Patient requires set up or clean up by helper.  Patient completes activity

  by  themselves


4    Supervision or touching assist (CGA). [place] provide cues , steadying 

assist


3    The helper provides less than half the effort to complete the activity


2    The helper provides more than half the effort to complete the activity


1    Dependent.  The helper does all the effort to complete an activity 


7    Patient refused to complete or attempt activity


9    The patient did not perform the activity before the current illness or 

injury


88  Not attempted due to Medical conditions or safety concerns





Functional Abilities and Goals:


Independent: Patient completed the activities by him/herself, with or without 

an assistive device, with no assistance from a helper.


Needed Some Help: Patient needed partial assistance from another person to 

complete activities.


Dependent: A helper completed the activities for the patient. 


Unknown:


Not Applicable:


Bed Mobility:  6


Transfers (B,C,W/C) (FIM):  6


Gait:  6


Stairs:  6


Patient seems to be a poor historian but states that she was using a rolling 

walker previously.





PT Evaluation-Current


Subjective


Patient in recliner pre tx, agrees to PT, has pain in right side (ribs) 6/10.





Pt/Family Goals


to be independent at home





Objective


Patient Orientation:  Person, Confused


Attachments:  IV





ROM/Strength


ROM Lower Extremities


WNL


Strength Lower Extremities


4/5 gross bilateral lower extremities





Sensory


Vision:  Functional


Hearing:  Functional


Sensation Right Lower Extremit:  Intact


Sensation Left Lower Extremity:  Intact





Transfers


Therapy Code Descriptions/Definitions 





Functional [place] Measure:


0=Not Assessed/NA        4=Minimal Assistance


1=Total Assistance        5=Supervision or Setup


2=Maximal Assistance  6=Modified [place]


3=Moderate Assistance 7=Complete [place]


Transfers (B, C, W/C) (FIM):  4


Sit to/from Stand:  4


bed t/f WC(FIM only if WC use):  4


CGA for sit to stand, may need min assist with transfers to help guide the 

walker, cues for safety





Gait


Mode of Locomotion:  Walk


Anticipated Mode of Locomotion:  Walk


Gait (FIM):  4


Distance:  200'


Gait Level of Assist:  4


Gait Persons Needed:  1


Gait Assistive Device:  FWW


Comments/Gait Description


Patient ambulates with walker too far forward, needs not only cues for this but 

min assist to help steady walker, uncoordinated steps and impaired balance, 

pigeon toed





Balance


Sitting Static:  Normal


Sitting Dynamic:  Normal


Standing Static:  Poor


 Standing Dynamic:  Poor





Treatment


seated exercises x15 (AP, LAQ)





Assessment/Needs


Patient has impaired mobility, strength, endurance, balance.  She seems to be 

confused and is a fall risk.  Patient in recliner post tx, has nurse call, phone

, tray, chair alarm on.


Rehab Potential:  Fair





PT Short Term Goals


Short Term Goals


Time Frame:  Jan 9, 2019


Transfers (B,C,W/C) (FIM):  5


Gait (FIM):  5


Gait Distance Comment:  250'


Gait Level of Assist:  5


Gait Assistive Device:  FWW





PT Plan


Problem List


Problem List:  Activity Tolerance, Functional Strength, Safety, Balance, Gait, 

Transfer, Bed Mobility





Treatment/Plan


Treatment Plan:  Continue Plan of Care


Treatment Plan:  Bed Mobility, Education, Functional Activity Nell, Functional 

Strength, Gait, Safety, Therapeutic Exercise, Transfers


Treatment Duration:  Jan 9, 2019


Frequency:  6 times per week


Estimated Hrs Per Day:  .25 hour per day (15-30')


Patient and/or Family Agrees t:  Yes





Safety Risks/Education


Patient Education:  Gait Training, Transfer Techniques, Correct Positioning, 

Safety Issues


Teaching Recipient:  Patient


Teaching Methods:  Demonstration, Discussion


Response to Teaching:  Reinforcement Needed





Discharge Recommendations


Plan


Patient will perform bed mobility and transfer training, balance and endurance 

training, functional strengthening, stair training, gait training, and education

, to improve functional mobility and independence at home.


Therapy D/C Recommendations:  Home w/ Family Support, Skilled Nursing (TCU/NH)





Time/GCodes


Time In:  1110


Time Out:  1124


Total Billed Treatment Time:  14


Total Billed Treatment


1 visit


EVM 14'











DAYO MURILLO PT Jan 2, 2019 11:36

## 2019-01-02 NOTE — NUR
IRF Evaluation: 



Order received to evaluate patient for the ARU. According to chart, SW is actively working 
in conjunction with both patient and daughter to establish SNF placement; therefore, the 
patient will not be further evaluated for the ARU. 



Thank you for this referral.

## 2019-01-02 NOTE — DIAGNOSTIC IMAGING REPORT
Clinical indication: Patient with fever.



Exam: Chest x-ray PA and lateral views.



Comparisons: Chest x-ray dated 12/31/2018.



Findings:



Lungs/pleura: There is interval increased opacity in the right

cardiophrenic angle region which obscures the right

hemidiaphragm, concerning for atelectasis. There is no other

concern for interval lung infiltrate. There is no pneumothorax.

There is no pleural effusion.



Mediastinum: Unremarkable. 



Pulmonary vasculature: Unremarkable.



Heart: Unremarkable.



Bones/extrathoracic soft tissue: There are small degenerative

spurs involving the thoracic spine.



Impression:

 There is interval right basilar consolidation suspected to

represent atelectasis, but superimposed infiltrate cannot be

completely excluded. Follow up chest x-ray in 2 - 4  weeks is

suggested to evaluate for interval resolution of this finding.



Dictated by: 



  Dictated on workstation # [id number]

## 2019-01-02 NOTE — PROGRESS NOTE-HOSPITALIST
Subjective


HPI/CC On Admission


Date Seen by Provider:  Jan 2, 2019


Time Seen by Provider:  08:30


CC: Weakness with fever and elevated lactic acid





HPI: This is an 84-year-old white female clinic patient of [facility] 

resides at home with her son who presented to the ER with altered mental status 

and fatigue and significant drowsiness likely due to just starting Seroquel the 

night before..  Patient was found to have elevated lactic acid with fever and 

leukocytosis she was placed on empiric antibiotics IV fluids and monitor 

closely for supportive care.  Patient is very frail and likely needs nursing 

home placement.  Patient at this current time feels much better she is eating 

and drinking and has no pain reported.  I have restarted all of her home 

medication and will be replacing potassium with IV fluids.  Checked meds and 

labs.


Subjective/Events-last exam


Patient doing much better


Will order physical therapy and occupational therapy


May need nursing home or inpatient rehabilitation


Low-grade fever noted


Maintained on Zosyn


We'll check chest x-ray since during altered mental status episode that could 

have had a small aspiration pneumonia the lungs are clear today


Bowels are moving


No other concerns





Review of Systems


General:  Fatigue





Focused Exam


Lactate Level


12/31/18 12:48: Lactic Acid Level 2.62*H


12/31/18 14:55: Lactic Acid Level 1.87








Objective


Exam


Vital Signs





Vital Signs








  Date Time  Temp Pulse Resp B/P (MAP) Pulse Ox O2 Delivery O2 Flow Rate FiO2


 


1/2/19 08:33 99.5 71 20 141/66 (91) 97 Room Air  





Capillary Refill : Less Than 3 Seconds


General Appearance:  No Apparent Distress, WD/WN, Chronically ill, Thin


Respiratory:  Chest Non Tender, Lungs Clear, Normal Breath Sounds, No Accessory 

Muscle Use, No Respiratory Distress


Cardiovascular:  Regular Rate, Rhythm, No Edema, No Gallop, No JVD, No Murmur, 

Normal Peripheral Pulses


Neurologic/Psychiatric:  Alert, Oriented x3, No Motor/Sensory Deficits, Normal 

Mood/Affect


Skin:  Normal Color, Warm/Dry





Results/Procedures


Lab


Laboratory Tests


1/2/19 05:19








Patient resulted labs reviewed.





Assessment/Plan


Assessment and Plan


Assess & Plan/Chief Complaint


Assessment:


Altered mental status likely due to Seroquel


Fever with elevated lactic acid and leukocytosis rechecking CXR today


Dementia


Weight loss


Frail status


Advanced age


Depression





Plan:


NHP tomorrow


Likely not IRF candidate


Check CXR


Frail status





Diagnosis/Problems


Diagnosis/Problems





(1) Fever, unknown origin


Status:  Acute


(2) Lactic acidosis


Status:  Resolved


Resolution Date/Time:  1/2/19 @ 11:13


(3) Dehydration


Status:  Resolved


Resolution Date/Time:  1/2/19 @ 11:13


(4) Advanced age


Status:  Acute


(5) Debility


Status:  Acute


(6) Hypertension


Status:  Chronic


Qualifiers:  


   Hypertension type:  essential hypertension  Qualified Codes:  I10 - 

Essential (primary) hypertension


(7) Hyperlipidemia


Status:  Chronic


(8) Anemia


Status:  Chronic


Qualifiers:  


   Anemia type:  unspecified type  Qualified Codes:  D64.9 - Anemia, unspecified


(9) Depression


Status:  Chronic


Qualifiers:  


   Depression Type:  unspecified  Qualified Codes:  F32.9 - Major depressive 

disorder, single episode, unspecified


(10) Hypokalemia


Status:  Acute


(11) Leukocytosis


Status:  Acute


Qualifiers:  


   Leukocytosis type:  leukemoid reaction  Qualified Codes:  D72.823 - 

Leukemoid reaction


(12) Altered mental status


Status:  Acute


Qualifiers:  


   Altered mental status type:  stupor  Qualified Codes:  R40.1 - Stupor


(13) Side effect of medication


Status:  Acute





Clinical Quality Measures


DVT/VTE Risk/Contraindication:


Risk Factor Score Per Nursing:  3


RFS Level Per Nursing on Admit:  3=High











JASON RODRIGUEZ DO Jan 2, 2019 09:03
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
## 2019-01-02 NOTE — OCCUPATIONAL THERAPY EVAL
OT Evaluation-General/PLF


Medical Diagnosis


Admission Date


Dec 31, 2018 at 14:08


Medical Diagnosis:  weakness


Onset Date:  Dec 31, 2018





Therapy Diagnosis


Therapy Diagnosis:  decr self care, weakness, decr funct mob, decr act kwan





Height/Weight


Height (Feet):  5


Height (Inches):  4.00


Weight (Pounds):  113


Weight (Ounces):  8.0





Precautions


Precautions/Isolations:  Fall Prevention, Standard Precautions


Safety Interventions:  Bed Exit Alarm, Reorient-Attempt





Referral


Physician:  Rachelle Hewitt DO


Referral Reason:  Evaluation/Treatment





Medical History


Pertinent Medical History:  Arthritis, Dementia, HTN


Additional Medical History


Hospitalized 12-12-18 with multiple rib fx. Chronic UTI. Gout. Hyperthyroidism. 

Hard of hearing. Recent skin changes


Current History


Admitted with nausea/vomiting/diarrhea. AMS, fatigue. Dehydration. Depression.


Reviewed History:  Yes





Social History


Home:  Single Level


Current Living Status:  Children


Entry Into Home:  Stairs Without Railing


 Steps Into Home:  1


Lives with 62 year old son who works during the day





ADL-Prior Level of Function


Therapy Code Descriptions/Definitions 





Functional [place] Measure:


0=Not Assessed/NA        4=Minimal Assistance


1=Total Assistance        5=Supervision or Setup


2=Maximal Assistance  6=Modified [place]


3=Moderate Assistance 7=Complete [place]








Therapy Quality Codes:


6    Independent with activity with or without an assistive device


5    Patient requires set up or clean up by helper.  Patient completes activity

  by  themselves


4    Supervision or touching assist (CGA). [place] provide cues , steadying 

assist


3    The helper provides less than half the effort to complete the activity


2    The helper provides more than half the effort to complete the activity


1    Dependent.  The helper does all the effort to complete an activity 


7    Patient refused to complete or attempt activity


9    The patient did not perform the activity before the current illness or 

injury


88  Not attempted due to Medical conditions or safety concerns





Functional Abilities and Goals:


Independent: Patient completed the activities by him/herself, with or without 

an assistive device, with no assistance from a helper.


Needed Some Help: Patient needed partial assistance from another person to 

complete activities.


Dependent: A helper completed the activities for the patient. 


Unknown:


Not Applicable:


ADL PLOF Comments


Pt reported that she was able to manage all of her basic self care needs except 

that her son helped her with bathing. She hasn't driven for at least 10 years. 

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 When she was 

discharged from hospital in December, she walked with SBA, FWW


Self Care:  Needed Some Help





OT Current Status


Subjective


Pt seen in room, up in recliner, eating. Pain reported 0/10





Appearance


Alert, cooperative. Reported date as 1-4-19. Believed that she was here because 

she fell and broke 5 ribs.





Current


Hearing Aids:  No


Hand Dominance:  Right


Upper Extremity ROM


Grossly WFL bilat. Arthritic changes in hands


Upper Extremity Strength


Grossly 4/5 bilat





ADL-Treatment


ADL-Current


Pt walked 200' with min assist, FWW with PT earlier. Nursing reported that she 

is able to do toilet transfer with SBA "She pops right up" and manages clothing 

and hygiene.  Pt was feeding herself without help.  Chair alarm for safety


Therapy Code Descriptions/Definitions 





Functional [place] Measure:


0=Not Assessed/NA        4=Minimal Assistance


1=Total Assistance        5=Supervision or Setup


2=Maximal Assistance  6=Modified [place]


3=Moderate Assistance 7=Complete [place]








Therapy Quality Codes:


6    Independent with activity with or without an assistive device


5    Patient requires set up or clean up by helper.  Patient completes activity

  by  themselves


4    Supervision or touching assist (CGA). [place] provide cues , steadying 

assist


3    The helper provides less than half the effort to complete the activity


2    The helper provides more than half the effort to complete the activity


1    Dependent.  The helper does all the effort to complete an activity 


7    Patient refused to complete or attempt activity


9    The patient did not perform the activity before the current illness or 

injury


88  Not attempted due to Medical conditions or safety concerns





Education


OT Patient Education:  Purpose of tx/functional activities, Rehab process


Teaching Recipient:  Patient


Teaching Methods:  Discussion


Response to Teaching:  Verbalize Understanding





OT Long Term Goals


Long Term Goals


Time Frame:  Jan 9, 2019


Eating (FIM):  6


Grooming(FIM):  6


Bathing(FIM):  5


Upper Body Dressing(FIM):  5


Lower Body Dressing(FIM):  5


Toileting(FIM):  5


Toilet/Commode Transfer(FIM):  5


Shower Transfer(FIM):  5


Additional Goals:  1-Demonstrate ADL Tasks, 2-Verbalize Understanding, 3-

ImproveStrength/Nell


1=Demonstrate adherence to instructed precautions during ADL tasks.


2=Patient will verbalize/demonstrate understanding of assistive devices/

modifications for ADL.


3=Patient will improve strength/tolerance for activity to enable patient to 

perform ADL's.





OT Education/Plan


Problem List/Assessment


Assessment:  Decreased Activ Tolerance, Decreased Safety Aware, Decreased UE 

Strength, Dependent Transfers, Impaired Self-Care Skills


Pt would benefit from skilled OT to increase her independence in basic self care





Discharge Recommendations


Plan/Recommendations:  Continue POC





Treatment Plan/Plan of Care


Treatment,Training & Education:  Yes


Patient would benefit from OT for education, treatment and training to promote 

independence in ADL's, mobility, safety and/or upper extremity function for ADL'

s.


Plan of Care:  ADL Retraining, Functional Mobility, UE Funct Exercise/Act, UE 

Neuromus Re-Ed/Coord


Treatment Duration:  Jan 9, 2019


Frequency:  5 times per week


Estimated Hrs Per Day:  .25 hour per day


Agreement:  Yes


Rehab Potential:  Fair





Time/GCodes


Start Time:  13:38


Stop Time:  13:50


Total Time Billed (hr/min):  12


Billed Treatment Time


visit, 12 minutes evaluation moderate intensity











NETTA GRUBER OT Jan 2, 2019 14:02

## 2019-01-03 NOTE — NUR
CM/SS.  Patient discharged to new Medicare skilled placement with [facility], 
transport scheduled for 1430.  [facility] understands to bring wheelchair for patient, no O2 is 
needed.



CARE Assessment completed with patient, processed with [facility].

Faxed orders and CARE to SNF, continuum of care packet prepared to accompany patient.

Patient and her daughter/POA Nikki both aware of all arrangements.

Unit RN updated.

## 2019-01-03 NOTE — DISCHARGE SUMMARY-HOSPITALIST
Diagnosis/Chief Complaint


Date of Admission


Dec 31, 2018 at 14:08


Date of Discharge





Discharge Date:  Chu 3, 2019


Admission Diagnosis


Assessment:


Altered mental status likely due to Seroquel


Fever with elevated lactic acid and leukocytosis


Dementia


Weight loss


Frail status


Advanced age


Depression





Plan:


Restart all home meds except for cervical


Maintain IV fluids


Replace potassium and IV fluids


Monitor closely


May need nursing home placement


Discharge Diagnosis





(1) Aspiration pneumonia


Status:  Acute


(2) Fever, unknown origin


Status:  Resolved


(3) Lactic acidosis


Status:  Resolved


(4) Dehydration


Status:  Resolved


(5) Advanced age


Status:  Acute


(6) Debility


Status:  Acute


(7) Hypertension


Status:  Chronic


(8) Hyperlipidemia


Status:  Chronic


(9) Anemia


Status:  Chronic


(10) Depression


Status:  Chronic


(11) Hypokalemia


Status:  Acute


(12) Leukocytosis


Status:  Acute


(13) Altered mental status


Status:  Acute


(14) Side effect of medication


Status:  Acute





Discharge Summary


Discharge Physical Exam


Allergies:  


Coded Allergies:  


     quetiapine (Verified  Adverse Reaction, Unknown, 12/31/18)


 


 confusion


Vitals & I&Os





Vital Signs








  Date Time  Temp Pulse Resp B/P (MAP) Pulse Ox O2 Delivery O2 Flow Rate FiO2


 


1/3/19 08:00 98.3 63 16 125/58 (80) 97 Room Air  








General Appearance:  No Apparent Distress, WD/WN, Chronically ill


Respiratory:  Chest Non Tender, Lungs Clear, Normal Breath Sounds, No Accessory 

Muscle Use, No Respiratory Distress


Cardiovascular:  Regular Rate, Rhythm, No Edema, No Gallop, No JVD, No Murmur, 

Normal Peripheral Pulses


Neurologic/Psychiatric:  Alert, Oriented x3, No Motor/Sensory Deficits, Normal 

Mood/Affect, Disoriented (subtle)





Hospital Course


Hospital course: Patient had an uneventful hospital course she was admitted 

after and altered mental status episode likely due to Seroquel initiation the 

day before for the first time.  Patient was found to have leukocytosis and 

elevated lactic acid so she was placed on aspiration pneumonia treatment and 

monitor closely and given IV fluids and supportive care.  Repeat chest x-ray 

confirmed aspiration pneumonia and she will continue oral antibiotics for 4 

more days and be placed in a nursing home likely permanent placement.


Labs (last 24 hrs)


Laboratory Tests


1/3/19 04:45: 


White Blood Count 6.9, Red Blood Count 3.20L, Hemoglobin 9.4L, Hematocrit 29L, 

Mean Corpuscular Volume 91, Mean Corpuscular Hemoglobin 29, Mean Corpuscular 

Hemoglobin Concent 32, Red Cell Distribution Width 15.5H, Platelet Count 160, 

Mean Platelet Volume 9.0, Neutrophils (%) (Auto) 47, Lymphocytes (%) (Auto) 38, 

Monocytes (%) (Auto) 12, Eosinophils (%) (Auto) 4, Basophils (%) (Auto) 0, 

Neutrophils # (Auto) 3.3, Lymphocytes # (Auto) 2.6, Monocytes # (Auto) 0.8, 

Eosinophils # (Auto) 0.3, Basophils # (Auto) 0.0, Sodium Level 141, Potassium 

Level 3.7, Chloride Level 109H, Carbon Dioxide Level 24, Anion Gap 8, Blood 

Urea Nitrogen 9, Creatinine 0.93, Estimat Glomerular Filtration Rate 57, BUN/

Creatinine Ratio 10, Glucose Level 85, Calcium Level 8.3L, Corrected Calcium 9.1

, Total Bilirubin 0.5, Aspartate Amino Transf (AST/SGOT) 18, Alanine 

Aminotransferase (ALT/SGPT) 18, Alkaline Phosphatase 67, Total Protein 5.4L, 

Albumin 3.0L





Microbiology


12/31/18 Blood Culture - Preliminary, Resulted


           No growth


12/31/18 Influenza Types A,B Antigen (BOB) - Final, Complete


           


Patient resulted labs reviewed.


Pending Labs


Laboratory Tests


1/3/19 04:45: 


White Blood Count 6.9, Red Blood Count 3.20, Hemoglobin 9.4, Hematocrit 29, 

Mean Corpuscular Volume 91, Mean Corpuscular Hemoglobin 29, Mean Corpuscular 

Hemoglobin Concent 32, Red Cell Distribution Width 15.5, Platelet Count 160, 

Mean Platelet Volume 9.0, Neutrophils (%) (Auto) 47, Lymphocytes (%) (Auto) 38, 

Monocytes (%) (Auto) 12, Eosinophils (%) (Auto) 4, Basophils (%) (Auto) 0, 

Neutrophils # (Auto) 3.3, Lymphocytes # (Auto) 2.6, Monocytes # (Auto) 0.8, 

Eosinophils # (Auto) 0.3, Basophils # (Auto) 0.0, Sodium Level 141, Potassium 

Level 3.7, Chloride Level 109, Carbon Dioxide Level 24, Anion Gap 8, Blood Urea 

Nitrogen 9, Creatinine 0.93, Estimat Glomerular Filtration Rate 57, BUN/

Creatinine Ratio 10, Glucose Level 85, Calcium Level 8.3, Corrected Calcium 9.1

, Total Bilirubin 0.5, Aspartate Amino Transf (AST/SGOT) 18, Alanine 

Aminotransferase (ALT/SGPT) 18, Alkaline Phosphatase 67, Total Protein 5.4, 

Albumin 3.0








Discussion & Recommendations


Discharge Planning:  <30 minutes discharge planning





Discharge


Home Medications:





Active Scripts


Active


Augmentin 500-125 Tablet (Amoxicillin/Potassium Clav) 1 Each Tablet 1 Each PO 

BID


Reported


Tylenol 8 Hour (Acetaminophen) 650 Mg Tablet.er 650 Mg PO BID


Lexapro (Escitalopram Oxalate) 10 Mg Tablet 10 Mg PO DAILY


Fish Oil 1,000 mg Capsule (Omega 3 Polyunsat Fatty Acids) 1,000 Mg Cap 1,000 Mg 

PO DAILY


Aspir 81 (Aspirin) 81 Mg Tablet.dr 81 Mg PO DAILY





Instructions to patient/family


Please see electronic discharge instructions given to patient.





Clinical Quality Measures


DVT/VTE Risk/Contraindication:


Risk Factor Score Per Nursing:  3


RFS Level Per Nursing on Admit:  3=High





Problem Qualifiers





(1) Aspiration pneumonia:  


Aspiration pneumonia type:  unspecified  Laterality:  right  Lung location:  

lower lobe of lung  Qualified Codes:  J69.0 - Pneumonitis due to inhalation of 

food and vomit


(2) Hypertension:  


Hypertension type:  essential hypertension  Qualified Codes:  I10 - Essential (

primary) hypertension


(3) Anemia:  


Anemia type:  unspecified type  Qualified Codes:  D64.9 - Anemia, unspecified


(4) Depression:  


Depression Type:  unspecified  Qualified Codes:  F32.9 - Major depressive 

disorder, single episode, unspecified


(5) Leukocytosis:  


Leukocytosis type:  leukemoid reaction  Qualified Codes:  D72.823 - Leukemoid 

reaction


(6) Altered mental status:  


Altered mental status type:  stupor  Qualified Codes:  R40.1 - [place]








JASON RODRIGUEZ DO Chu 3, 2019 09:47

## 2019-01-03 NOTE — PHYSICAL THERAPY DAILY NOTE
PT Daily Note-Current


Subjective


Patient agrees to PT.





Pain





   Numeric Pain Scale:  0-No Pain


   Location:  No Pain Reported





Mental Status


Patient Orientation:  Person, Time





Transfers


Therapy Code Descriptions/Definitions 





Functional [place] Measure:


0=Not Assessed/NA        4=Minimal Assistance


1=Total Assistance        5=Supervision or Setup


2=Maximal Assistance  6=Modified [place]


3=Moderate Assistance 7=Complete [place]








Therapy Quality Codes:


6    Independent with activity with or without an assistive device


5    Patient requires set up or clean up by helper.  Patient completes activity

  by  themselves


4    Supervision or touching assist (CGA). [place] provide cues , steadying 

assist


3    The helper provides less than half the effort to complete the activity


2    The helper provides more than half the effort to complete the activity


1    Dependent.  The helper does all the effort to complete an activity 


7    Patient refused to complete or attempt activity


9    The patient did not perform the activity before the current illness or 

injury


88  Not attempted due to Medical conditions or safety concerns


Transfers (B, C, W/C) (FIM):  5


Scootin


Sit to/from Stand:  5





Gait Training


Gait (FIM):  5


Distance (FIM):  3=150 ft


Distance:  800'


Gait Level of Assist:  5


Gait Assistive Device:  FWW


WBOS, shuffle gait sequence with extended UE's with FWW use





Assessment


skilled cues for body placement in FWW due to extended UE's.  Patient tolerated 

treatment well and remains up in recliner with chair alarm activated.





PT Short Term Goals


Short Term Goals


Time Frame:  2019


Gait (FIM):  5


Gait Distance Comment:  250'


Gait Level of Assist:  5


Gait Assistive Device:  FWW





PT Plan


Treatment/Plan


Treatment Plan:  Continue Plan of Care


Treatment Plan:  Bed Mobility, Education, Functional Activity Nell, Functional 

Strength, Gait, Safety, Therapeutic Exercise, Transfers


Treatment Duration:  2019


Frequency:  6 times per week


Estimated Hrs Per Day:  .25 hour per day (15-30')


Patient and/or Family Agrees t:  Yes





Time/GCodes


Time In:  955


Time Out:  1005


Total Billed Treatment Time:  10


Total Billed Treatment


1 visit


GT 10 min











NICKI DERAS PT Chu 3, 2019 10:09

## 2019-02-11 NOTE — DIAGNOSTIC IMAGING REPORT
PROCEDURE: CT abdomen and pelvis without contrast.



TECHNIQUE: Multiple contiguous axial images were obtained through

the abdomen and pelvis without the use of intravenous contrast. 



INDICATION: Status post fall in shower one day earlier. Now with

left-sided abdominal pain.



CORRELATION STUDY: 12/12/2018



FINDINGS:  Evaluation limited given lack of contrast.  

LOWER THORAX: Heart size borderline with trace pericardial

effusion. Small hiatal hernia. No significant basilar infiltrate

or pneumothorax. Trace left pleural effusion.



LIVER: Unremarkable on unenhanced imaging.

GALLBLADDER: Multiple small gallstones. No bile duct dilatation.

SPLEEN: Unremarkable. Calcified granuloma. No significant

perisplenic fluid collection.

PANCREAS: Unremarkable.

ADRENAL GLANDS: Unremarkable.

KIDNEYS: Slight asymmetric areas of thinning anteriorly of the

right kidney. No hydronephrosis or obstruction.

ABDOMINAL AORTA: Moderate aortic wall calcification,

nonaneurysmal.



GASTROINTESTINAL TRACT: Stomach relatively decompressed and does

contain some fluid. Small bowel without obstruction. Colon with

mild/moderate severity fecal retention. No obstruction or

inflammation. No abdominal ascites or free air.



URINARY BLADDER: Unremarkable.

REPRODUCTIVE: Uterus and adnexa appearing unremarkable.



OSSEOUS STRUCTURES: Nondisplaced left posterior lateral 11th rib

fracture. Suspect for nondisplaced 10th rib fracture.

Additionally, relatively nondisplaced but slightly comminuted

fractures about the left L1, L2 transverse processes. Old healed

bilateral pubic rami fractures are also present.



OTHER:  None.



IMPRESSION:

1. Nondisplaced left posterior lateral 11th and perhaps 10th rib

fractures. Trace left pleural effusion.

2. Comminuted but relatively nondisplaced left L1 and L2

transverse process fractures.



Dictated by: 



  Dictated on workstation # [id number]

## 2019-02-11 NOTE — DIAGNOSTIC IMAGING REPORT
EXAM: CT thoracic and lumbar spine without contrast.



DATE: February 11, 2019.



INDICATION: 84-year-old female, thoracic and lumbar spine pain.



COMPARISON: CT chest, abdomen and pelvis December 12, 2018.



TECHNIQUE: Axial CT images at the level of the thoracic and

lumbar spine were obtained. Coronal and sagittal reformats were

obtained and provided.



FINDINGS:  

There are limitations of the exam as acquisitions were not

separately obtained for both the thoracic and lumbar spine. The

combined field-of-view of imaging does limit spatial resolution,

especially on coronal and sagittal reformats.



There is a mild thoracolumbar levocurvature. The anterior to

posterior alignment of the thoracic and lumbar spine is

unremarkable. There is no identified pars interarticularis

defect. There are multilevel mild to moderate disc degenerative

changes of the thoracic and lumbar spine. CT is limited for

assessment of disc pathology as well as additional non-bony

causes of foraminal and spinal stenosis. There is

chondrocalcinosis present.



There is a very mildly displaced fracture of the left L1

transverse process best illustrated on axial image 134. There is

also a very mildly displaced left L2 transverse process fracture.

There is no identified fracture specifically at the level of the

thoracic spine.



There are bilateral sacroiliac degenerative changes.



The visualized portions of the lungs are clear. There are

atherosclerotic calcifications.



IMPRESSION:

1. Very mildly displaced fractures of the left L1 and L2

transverse processes.

2. No identified acute fracture specifically at the level of the

thoracic spine.

3. Multilevel mild to moderate disc degenerative changes of the

thoracic and lumbar spine. CT is limited for assessment of disc

pathology as well as additional non-bony causes of foraminal and

spinal stenosis.



Dictated by: 



  Dictated on workstation # [id number]

## 2019-02-11 NOTE — ED BACK PAIN
General


Chief Complaint:  Back Problems


Stated Complaint:  BACK PAIN,INJ IN SHOWER


Nursing Triage Note:  


PT REPORTS FALLING BACKWARDS INTO THE TOWEL RACK. PT DENIES HITTING HEAD OR 

LOC. 


PT REPORTS LEFT HIP PAIN FROM HER SKIN BURNING.


Nursing Sepsis Screen:  No Definite Risk


Source of Information:  Patient


Exam Limitations:  No Limitations





History of Present Illness


Date Seen by Provider:  2019


Time Seen by Provider:  17:08


Initial Comments


To ER with left thoracic back and left flank pain. This began while at 

[facility], she was in the bathroom when she fell backwards 

into the ground bar/stability handle in the bathroom. She has been able to walk 

but she complains of some left hip pain. She's had a cough but this has been 

present since even before the fall/injury. She did not hit her head and denies 

any neck pain. She denies any anticoagulant use. Denies any shortness of 

breath. He states that the last time she was here she had rib fractures on the 

right from a fall but does not have any right-sided pain today..


Location:  Lumbar Spine, Paraspinous Muscles


Timing/Duration:  1-3 Hours


Severity:  Moderate


Pain/Injury Location:  Back


Method of Injury:  Direct Blow, Fall


Modifying Factors:  Worse With Movement





Allergies and Home Medications


Allergies


Coded Allergies:  


     quetiapine (Verified  Adverse Reaction, Unknown, 19)


 


 confusion





Home Medications


Acetaminophen 650 Mg Tablet.er, 650 MG PO BID, (Reported)


Amoxicillin/Potassium Clav 1 Each Tablet, 1 EACH PO BID


   Prescribed by: JASON RODRIGUEZ on 1/3/19 0942


Aspirin 81 Mg Tablet.dr, 81 MG PO DAILY, (Reported)


Escitalopram Oxalate 10 Mg Tablet, 10 MG PO DAILY, (Reported)


Hydrocodone/Acetaminophen 1 Each Tablet, 1 EACH PO Q6H PRN for PAIN-MODERATE


   Prescribed by: DANELLE MENENDEZ on 19 8717


Omega 3 Polyunsat Fatty Acids 1,000 Mg Cap, 1,000 MG PO DAILY, (Reported)





Patient Home Medication List


Home Medication List Reviewed:  Yes





Review of Systems


Constitutional:  see HPI


EENTM:  see HPI


Respiratory:  no symptoms reported


Cardiovascular:  no symptoms reported


Genitourinary:  no symptoms reported


Musculoskeletal:  see HPI, back pain


Skin:  no symptoms reported


Psychiatric/Neurological:  No Symptoms Reported





Past Medical-Social-Family Hx


Patient Social History


Alcohol Use:  Denies Use


Recreational Drug Use:  No


Smoking Status:  Never a Smoker


Recent Foreign Travel:  No


Contact w/Someone Who Travel:  No


Recent Infectious Disease Expo:  No


Recent Hopitalizations:  No





Immunizations Up To Date


Tetanus Booster (TDap):  Unknown


PED Vaccines UTD:  No


Date of Pneumonia Vaccine:  Oct 1, 2017


Date of Influenza Vaccine:  Oct 1, 2017





Seasonal Allergies


Seasonal Allergies:  No





Past Medical History


Surgeries:  No


Respiratory:  No


Currently Using CPAP:  No


Currently Using BIPAP:  No


Cardiac:  Yes (SEES DR. BUSBY UNSURE WHY)


Hypertension


Neurological:  Yes


Dementia


Reproductive Disorders:  No


Female Reproductive Disorders:  Denies


Sexually Transmitted Disease:  No


HIV/AIDS:  No


Genitourinary:  Yes


UTI-Chronic


Gastrointestinal:  No


Musculoskeletal:  Yes


Fractures, Gout


Endocrine:  Yes


Hyperthyroidism


HEENT:  Yes


Cataract


Loss of Vision:  Denies


Hearing Impairment:  Hard of Hearing


Cancer:  No


Psychosocial:  No


Integumentary:  No


Recent Skin Changes


Blood Disorders:  No


Adverse Reaction/Blood Tranf:  No





Family Medical History





Dementia


  G8 SISTER


Diabetes mellitus


  19 FATHER


No Pertinent Family Hx, Diabetes, Psychiatric Problems





Physical Exam


Vital Signs





Vital Signs - First Documented








 19





 16:20


 


Temp 97.0


 


Pulse 80


 


Resp 16


 


B/P (MAP) 201/94 (129)


 


Pulse Ox 95





Capillary Refill : Less Than 3 Seconds


Height, Weight, BMI


Height: 5'4.00"


Weight: 117lbs. 8.0oz. 53.540543ny; 19.5 BMI


Method:Stated


General Appearance:  No Apparent Distress, WD/WN, Other (alert and oriented GCS 

15)


Neck:  Full Range of Motion, Normal Inspection


Respiratory:  No Accessory Muscle Use, No Respiratory Distress, Other (left 

posterior and lateral chest wall is tender to palpation. )


Gastrointestinal:  Normal Bowel Sounds, Non Tender, Soft


Back:  Other (there is ecchymosis and tenderness to palpation over 

approximately T10 to L2 left paraspinous region.)


Extremity:  Normal Capillary Refill, Normal Inspection


Neurologic/Psychiatric:  Alert, Oriented x3, No Motor/Sensory Deficits


Skin:  Normal Color, Warm/Dry





Progress/Results/Core Measures


Results/Orders


Lab Results





Laboratory Tests








Test


 19


16:50 19


17:35 Range/Units


 


 


White Blood Count


 10.6 


 


 4.3-11.0


10^3/uL


 


Red Blood Count


 3.85 L


 


 4.35-5.85


10^6/uL


 


Hemoglobin 11.5   11.5-16.0  G/DL


 


Hematocrit 35   35-52  %


 


Mean Corpuscular Volume 90   80-99  FL


 


Mean Corpuscular Hemoglobin 30   25-34  PG


 


Mean Corpuscular Hemoglobin


Concent 33 


 


 32-36  G/DL





 


Red Cell Distribution Width 15.1 H  10.0-14.5  %


 


Platelet Count


 208 


 


 130-400


10^3/uL


 


Mean Platelet Volume 9.0   7.4-10.4  FL


 


Neutrophils (%) (Auto) 70   42-75  %


 


Lymphocytes (%) (Auto) 19   12-44  %


 


Monocytes (%) (Auto) 10   0-12  %


 


Eosinophils (%) (Auto) 0   0-10  %


 


Basophils (%) (Auto) 0   0-10  %


 


Neutrophils # (Auto) 7.5   1.8-7.8  X 10^3


 


Lymphocytes # (Auto) 2.0   1.0-4.0  X 10^3


 


Monocytes # (Auto) 1.1 H  0.0-1.0  X 10^3


 


Eosinophils # (Auto)


 0.0 


 


 0.0-0.3


10^3/uL


 


Basophils # (Auto)


 0.0 


 


 0.0-0.1


10^3/uL


 


Sodium Level 138   135-145  MMOL/L


 


Potassium Level 3.8   3.6-5.0  MMOL/L


 


Chloride Level 101     MMOL/L


 


Carbon Dioxide Level 23   21-32  MMOL/L


 


Anion Gap 14   5-14  MMOL/L


 


Blood Urea Nitrogen 18   7-18  MG/DL


 


Creatinine


 0.95 


 


 0.60-1.30


MG/DL


 


Estimat Glomerular Filtration


Rate 56 


 


  





 


BUN/Creatinine Ratio 19    


 


Glucose Level 121 H    MG/DL


 


Calcium Level 9.4   8.5-10.1  MG/DL


 


Urine Color  YELLOW   


 


Urine Clarity


 


 SLIGHTLY


CLOUDY  





 


Urine pH  6.5  5-9  


 


Urine Specific Gravity  1.015 L 1.016-1.022  


 


Urine Protein  3+ H NEGATIVE  


 


Urine Glucose (UA)  NEGATIVE  NEGATIVE  


 


Urine Ketones  NEGATIVE  NEGATIVE  


 


Urine Nitrite  NEGATIVE  NEGATIVE  


 


Urine Bilirubin  NEGATIVE  NEGATIVE  


 


Urine Urobilinogen  NORMAL  NORMAL  MG/DL


 


Urine Leukocyte Esterase  1+ H NEGATIVE  


 


Urine RBC (Auto)  2+ H NEGATIVE  


 


Urine RBC  0-2   /HPF


 


Urine WBC  2-5   /HPF


 


Urine Squamous Epithelial


Cells 


 NONE 


  /HPF





 


Urine Crystals  NONE   /LPF


 


Urine Bacteria  NEGATIVE   /HPF


 


Urine Casts  NONE   /LPF


 


Urine Mucus  NEGATIVE   /LPF


 


Urine Culture Indicated  NO   








My Orders





Orders - DANELLE MENENDEZ


Cbc With Automated Diff (19 16:54)


Basic Metabolic Panel (19 16:54)


Chest Pa/Lat (2 View) (19 16:54)


Ct Abdomen/Pelvis Wo (19 16:54)


Ct Thoracic/Lumbar Spine Wo (19 17:08)


Hydrocodone/Apap 5/325 Tablet (Lortab 5 (19 17:15)


Ua Culture If Indicated (19 17:12)





Vital Signs/I&O











 19





 16:20


 


Temp 97.0


 


Pulse 80


 


Resp 16


 


B/P (MAP) 201/94 (129)


 


Pulse Ox 95














Blood Pressure Mean:  129











Diagnostic Imaging





   Diagonstic Imaging:  Xray


   Plain Films/CT/US/NM/MRI:  chest


Comments


NAME:   DARRION SINCLAIR


[facility] REC#:   [id number]


ACCOUNT#:   [id number]


PT STATUS:   REG ER


:   1935


PHYSICIAN:   DANELLE MENENDEZ


ADMIT DATE:   19/ER


 ***Draft***


Date of Exam:19





CHEST PA/LAT (2 VIEW)








EXAMINATION: CHEST (PA AND LATERAL)





CLINICAL INDICATION: 84-year-old female, fall. Chest pain.





COMPARISON: 2019.





FINDINGS: Heart size and mediastinal contours are unremarkable.


There is no identified pneumothorax. There is no pleural


effusion. There is no identified focal airspace consolidation.


There are right-sided rib deformities which appear unchanged


since comparison exam. There is an area of calcific attenuation


overlying the right lateral chest which was also seen previously.


This is not specific.


 


IMPRESSION: 


1. No identified interval acute cardiopulmonary abnormality.


2. Redemonstrated right-sided rib fractures.








  Dictated on workstation # [id number]








Dict:   19


Trans:   19


[facility] [id number]





Interpreted by:     ANTELMO LALA MD


Electronically signed by:





Departure


Communication (Admissions)


[id number]-discussed the case with Dr. Scales, agrees with plan to discharge home 

with pain control, deep breathing instructions and follow-up.





Impression





 Primary Impression:  


 Left rib fracture


 Qualified Codes:  S22.32XA - Fracture of one rib, left side, initial encounter 

for closed fracture


 Additional Impressions:  


 L1/L2 Transverse process fracture


 Bronchitis


Disposition:  01 HOME, SELF-CARE


Condition:  Stable





Departure-Patient Inst.


Decision time for Depature:  17:36


Referrals:  


NI HUMMEL MD (PCP/Family)


Primary Care Physician


Patient Instructions:  RIB FRACTURE





Add. Discharge Instructions:  


1. Return to ER for any concerns


2. Follow-up with your doctor next week


3. Pain medication as directed. However beware that the pain medication is 

constipating so increase her water intake and take a stool softener such as 

Colace if you did not already. You'll need to take a deep breath several times 

every hour to help reduce the likelihood of development of pneumonia. All 

discharge instructions reviewed with patient and/or family. Voiced 

understanding.


Scripts


Cefuroxime Axetil (Cefuroxime) 250 Mg Tablet


250 MG PO BID, #10 TAB


   Prov: DANELLE MENENDEZ APRN         19 


Hydrocodone/Acetaminophen (Norco 5-325 Tablet) 1 Each Tablet


1 EACH PO Q6H PRN for PAIN-MODERATE MDD 10, #20 TAB


   Prov: DANELLE MENENDEZ APRN         19





Images


Torso/Trunk











1 - Ecchymosis, Tenderness














DANELLE MENENDEZ 2019 17:12

## 2019-02-11 NOTE — DIAGNOSTIC IMAGING REPORT
EXAMINATION: CHEST (PA AND LATERAL)



CLINICAL INDICATION: 84-year-old female, fall. Chest pain.



COMPARISON: January 2, 2019.



FINDINGS: Heart size and mediastinal contours are unremarkable.

There is no identified pneumothorax. There is no pleural

effusion. There is no identified focal airspace consolidation.

There are right-sided rib deformities which appear unchanged

since comparison exam. There is an area of calcific attenuation

overlying the right lateral chest which was also seen previously.

This is not specific.

 

IMPRESSION: 

1. No identified interval acute cardiopulmonary abnormality.

2. Redemonstrated right-sided rib fractures.



Dictated by: 



  Dictated on workstation # [id number]

## 2020-09-10 NOTE — XMS REPORT
[facility]

 Created on: 2017



Kley Landis

External Reference #: [id number]

: 1935

Sex: Female



Demographics







 Address  [address] 

[place]  [address]

 

 Preferred Language  Unknown

 

 Marital Status  Unknown

 

 Denominational Affiliation  Unknown

 

 Race  Unknown

 

 Ethnic Group  Unknown





Author







 Author  BHAVNA TINEO

 

 Organization  [facility]

 

 Address  [address] [place]  [address]



 

 Phone  [phone]







Care Team Providers







 Care Team Member Name  Role  Phone

 

 BHAVNA TINEO  Unavailable  [phone]







PROBLEMS







 Type  Condition  ICD9-CM Code  DAF72-OL Code  Onset Dates  Condition Status  
SNOMED Code

 

 Problem  Hyperlipidemia  272.4        Active  [id number]

 

 Problem  Hyperlipidemia     E78.5     Active  [id number]

 

 Problem  Ataxia     R27.0     Active  [id number]

 

 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

 

 Problem  Hypertension  401.9        Active  [id number]

 

 Problem  Hypertension     I10     Active  38839141

 

 Problem  Unspecified psychosis  298.9        Active  [id number]







ALLERGIES

Unknown Allergies



SOCIAL HISTORY

No smoking Hx information available



PLAN OF CARE





VITAL SIGNS





MEDICATIONS







 Medication  Instructions  Dosage  Frequency  Start Date  End Date  Duration  
Status

 

 Aspirin Low Dose 81 MG  Orally Once a day  1 tablet  24h           Active

 

 Fish Oil 1000 MG  Orally Twice a day  1 capsule  12h           Active

 

 Tramadol HCl 50 mg  Orally 3 times a day  1 tablet as needed  8h           
Active

 

 Simvastatin 20 mg  Orally  (Home Delivery) Once a day  1 tablet in the evening
  24h        90 days  Active

 

 Bystolic 5 MG  Orally (Home Delivery) Once a day  1 tablet  24h        90 days
  Active

 

 Celexa 20 mg  Orally (Home Delivery) Once a day  1 tablet  24h        90 days  
Active

 

 Lisinopril 40 MG  Orally Once a day  1 tablet  24h        90  Active







RESULTS

No Results



PROCEDURES

No Known procedures



IMMUNIZATIONS

No Known Immunizations [place] was advised to see orthopedic surgeon at [facility].

## 2020-11-11 NOTE — XMS REPORT
Infusion Nursing Note:  Alannah FRANCISCO Wynn presents today for Zometa/C6D1 MVASI/Oxaliplatin.    Patient seen by provider today: Yes: Dr. Velazquez   present during visit today: Not Applicable.    Note: Dr. Velazquez spoke to writer and asked to also give pt Zometa today. Denies jaw pain/upcoming dental procedures. Taking Calcium/vitamin D supplements.      Intravenous Access:  Implanted Port.    Treatment Conditions:  Lab Results   Component Value Date    HGB 11.9 11/11/2020     Lab Results   Component Value Date    WBC 4.2 11/11/2020      Lab Results   Component Value Date    ANEU 1.4 11/11/2020     Lab Results   Component Value Date     11/11/2020      Lab Results   Component Value Date     11/11/2020                   Lab Results   Component Value Date    POTASSIUM 3.9 11/11/2020           No results found for: MAG         Lab Results   Component Value Date    CR 0.56 11/11/2020                   Lab Results   Component Value Date    LAUREN 8.5 11/11/2020                Lab Results   Component Value Date    BILITOTAL 0.3 11/11/2020           Lab Results   Component Value Date    ALBUMIN 3.5 11/11/2020                    Lab Results   Component Value Date    ALT 52 11/11/2020           Lab Results   Component Value Date    AST 59 11/11/2020       Results reviewed, labs MET treatment parameters, ok to proceed with treatment.  Give chemo if ANC >= 1200; Platelets >/= 75,000; Blood pressure </= 150/100 x2 (10 minutes apart).  Urine Protein 10.    Post Infusion Assessment:  Patient tolerated infusion without incident.  Blood return noted pre and post infusion.  No evidence of extravasations.  Access discontinued per protocol.       Discharge Plan:   Patient will return 12/2/20 for next appointment.   Patient discharged in stable condition accompanied by: self.  Departure Mode: Ambulatory.    Cuca Chang RN                         [facility]

 Created on: 2018



Boby Kely

External Reference #: [id number]

: 1935

Sex: Female



Demographics







 Address  [address] [place]  [address]

 

 Preferred Language  Unknown

 

 Marital Status  Unknown

 

 Baptism Affiliation  Unknown

 

 Race  Unknown

 

 Ethnic Group  Unknown





Author







 Author  ODIN GAUTHIER

 

 [facility]

 

 Address  [address] [place]  [address]



 

 Phone  [phone]







Care Team Providers







 Care Team Member Name  Role  Phone

 

 ODIN GAUTHIER  Unavailable  [phone]







PROBLEMS







 Type  Condition  ICD9-CM Code  MYV74-AW Code  Onset Dates  Condition Status  
SNOMED Code

 

 Problem  Hypertension     I10     Active  84496839

 

 Problem  Dementia in other diseases classified elsewhere without behavioral 
disturbance     F02.80     Active  047792277

 

 Problem  Ataxia     R27.0     Active  84366620

 

 Problem  Hyperlipidemia     E78.5     Active  23712430

 

 Problem  Delusional disorder     F22     Active  14538837

 

 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 
condition     F28     Active  96324466

 

 Problem  Psychosis, unspecified psychosis type     F29     Active  47597454

 

 Problem  Alzheimers disease with late onset     G30.1     Active  99913042

 

 Problem  Unspecified dementia without behavioral disturbance     F03.90     
Active  82077749

 

 Problem  Hallucinations     R44.3     Active  3623236







ALLERGIES

No Information



ENCOUNTERS







 Encounter  Location  Date  Diagnosis

 

 [place] [id number] [place] [address] [phone]-
[phone]  08 Aug, 2018   

 

 [place] [id number] [place] [address] [phone]
[phone]    Hallucinations R44.3 ; Alzheimers disease with late onset 
G30.1 and Toe pain, right M79.674

 

 [facility]  [id number] [place] [address] [phone]
[phone]     

 

 [facility]  [address] [phone]
[phone]    Hallucinations R44.3

 

 [facility]  [id number] [place] [address] [phone]-
[phone]     

 

 [facility]  [address] [phone]
[phone]    Delusional disorder F22 and Psychosis, unspecified 
psychosis type F29

 

 [facility]  [id number] [place] [address] [phone]-
[phone]     

 

 [facility]  [id number] [place] [address] 11800-
[phone]     

 

 [facility]  [id number] [place] [address] [phone]-
[phone]  22 May, 2018  Local infection of the skin and subcutaneous tissue, 
unspecified L08.9 and Other injury of unspecified body region, initial 
encounter T14.8XXA

 

 [facility]  [id number] [place] [address] [phone]-
[phone]  17 May, 2018   

 

 [facility]  [id number] [place] [address] [phone]-
[phone]  10 May, 2018   

 

 [facility]  [id number] [place] [address] [phone]
[phone]    Hallucinations R44.3

 

 [facility]  [id number] [place] [address] [phone]-
[phone]  19 Mar, 2018  Psychosis, unspecified psychosis type F29

 

 [facility]  [id number] [place] [address] [phone]-
[phone]  15 Mar, 2018   

 

 [facility]  [id number] [place] [address] 45503-
8110  13 Mar, 2018   

 

 [facility]  [id number] [place] [address] [phone]-
4953    Unspecified dementia without behavioral disturbance F03.90 
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 
condition F28

 

 [facility]  [id number] [place] [address] 64913-
[phone]     

 

 [facility]  [id number] [place] [address] [phone]-
[phone]     

 

 [facility]  [id number] [place] [address] [phone]-
[phone]     

 

 [facility]  [address] [place] [address] 88245-
4869    Dementia, unspecified, without behavioral disturbance F03.90

 

 [place] [id number] [place] [address] 39229-
[phone]  06 Dec, 2017  Medicare annual wellness visit, initial Z00.00 and 
Encounter for immunization Z23

 

 [place] [id number] [place] [address] [phone]-
0115    Ataxia R27.0 and Hallucinations R44.3

 

 [place] [id number] [place] [address] [phone]-
[phone]     

 

 [place] [id number] [place] [address] 57658-
[phone]  12 Oct, 2017  Encounter for immunization Z23

 

 [place] [id number] [place] [address] 29198-
3020  11 Sep, 2017  Hallucinations R44.3

 

 [place] [id number] [place] [address] 62506-
1310  05 Sep, 2017  Hallucinations R44.3

 

 [place] [id number] [place] [address] 37384-
[phone]  30 Aug, 2017  Arthralgia of left knee M25.562 ; Age-related nuclear 
cataract of both eyes H25.13 and Hallucinations R44.3

 

 [place] [id number] [place] [address] [phone]-
[phone]  24 Aug, 2017   

 

 [place] [id number] [place] [address] [phone]-
[phone]  14 Aug, 2017   

 

 [place] [id number] [place] [address] 39461-
[phone]    Hyperlipidemia E78.5 and Hypertension I10

 

 [place] [id number] [place] [address] 70604-
0347    Hyperlipidemia E78.5 and Hypertension I10

 

 [facility]  [id number] [place] [address] 27039-
[phone]     

 

 [facility]  [id number] [place] [address] 81561-
[phone]  15 2017  Hypertension I10 ; Alzheimers disease with late onset G30.1 
; Dementia in other diseases classified elsewhere without behavioral 
disturbance F02.80 and Ataxia R27.0

 

 [facility]  [id number] [place] [address] 86021-
4717  14 2016   

 

 [facility]  [id number] [place] [address] 97324-
[phone]     

 

 [facility]  [id number] [place] [address] 17753-
2914    Hypertension I10 and Ataxia R27.0

 

 [facility]  [id number] [place] [address] 59101-
[phone]  14 Oct, 2016   

 

 [facility]  [id number] [place] [address] 48767-
[phone]  26 Sep, 2016   

 

 [facility]  [id number] [place] [address] 40528-
0707  23 Sep, 2016   

 

 [facility]  [id number] [place] [address] 63112-
[phone]  23 Sep, 2016   

 

 [facility]  [id number] [place] [address] 28197-
0868  20 Sep, 2016   

 

 [facility]  [id number] [place] [address] 67383-
2093  16 Sep, 2016   

 

 [facility]  [id number] [place] [address] [phone]
[phone]  13 Aug, 2016  Urinary frequency R35.0 and Acute cystitis without 
hematuria N30.00

 

 [facility]  [id number] [place] [address] 10640-
[phone]     

 

 [facility]  [id number] [place] [address] 16289-
[phone]     

 

 [place] [id number] [place] [address] 41722-
2311  11 Mar, 2016  Hyperlipidemia E78.5

 

 [address] [phone]-
[phone]  10 Mar, 2016  Ataxia R27.0 ; Hyperlipidemia E78.5 and Hypertension I10

 

 [address] [phone]-
5821     

 

 [place] [id number] [place] [address] 59472-
4751    Ataxia R27.0 ; Senile cataracts of both eyes H25.9 and 
Constipation, unspecified constipation type K59.00

 

 [address] [phone]-
[phone]  02 Sep, 2015  Unspecified psychosis 298.9 and Organic dementia 294.8

 

 [address] [address]28-
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

 

 [address] [phone]-
[phone]  21 Aug, 2015  Dementia 294.20

 

 [place] [id number] [place] [address] 48556-
4834  13 Aug, 2015  Establishing care with new doctor, encounter for V65.8 ; 
Ataxia 781.3 ; Horizontal nystagmus 379.56 ; Hypertension 401.9 ; 
Hyperlipidemia 272.4 and History of TIA (transient ischemic attack) V12.54

 

 [place] [id number] [place] [address] 53359-
9553  06 Aug, 2015   

 

 [address] [address]239-
[phone]  05 Aug, 2015   







IMMUNIZATIONS

No Known Immunizations



SOCIAL HISTORY

Never Assessed



REASON FOR VISIT

Medication side effects



PLAN OF CARE





VITAL SIGNS





MEDICATIONS

Unknown Medications



RESULTS

No Results



PROCEDURES

No Known procedures



INSTRUCTIONS





MEDICATIONS ADMINISTERED

No Known Medications



MEDICAL (GENERAL) HISTORY







 Type  Description  Date

 

 Medical History  coronary artery disease   

 

 Medical History  Carotid stenosis   

 

 Medical History  hypertension   

 

 Medical History  hyperlipidemia   

 

 Medical History  mitral valve regurgitation   

 

 Medical History  vitamin D deficiency   

 

 Medical History  nystagmus   

 

 Medical History  chest pain syndrome/palpitations   

 

 Medical History  ataxia/unsteady gait   

 

 Medical History  TIAs   

 

 Medical History  stress test 2012 EF 81%; 2014 EF 78%   

 

 Medical History  echo 2012 EF 60% mild MR,TR, AV stenosis; 2014 EF 60% 
aortic regurgitatio, MR, TR   

 

 Medical History  carotid duplex 2012 <40% Bilat; 2014 <40% bilat   

 

 Medical History  cataracts   

 

 Surgical History  left cataract surgery  2018

 

 Hospitalization History  falls   

 

 Hospitalization History  left hip pain, age-indeterminate pubic rami fracture--
[facility]  16